# Patient Record
Sex: FEMALE | Race: WHITE | NOT HISPANIC OR LATINO | Employment: OTHER | URBAN - METROPOLITAN AREA
[De-identification: names, ages, dates, MRNs, and addresses within clinical notes are randomized per-mention and may not be internally consistent; named-entity substitution may affect disease eponyms.]

---

## 2018-10-22 RX ORDER — CARBIDOPA AND LEVODOPA 25; 100 MG/1; MG/1
1 TABLET ORAL 3 TIMES DAILY
COMMUNITY
End: 2024-01-01

## 2018-10-22 RX ORDER — MULTIVIT WITH MINERALS/LUTEIN
1 TABLET ORAL DAILY
COMMUNITY
End: 2023-01-01

## 2018-10-24 ENCOUNTER — ANESTHESIA EVENT (OUTPATIENT)
Dept: SURGERY | Facility: CLINIC | Age: 78
DRG: 483 | End: 2018-10-24
Payer: MEDICARE

## 2018-10-25 ENCOUNTER — HOSPITAL ENCOUNTER (INPATIENT)
Facility: CLINIC | Age: 78
LOS: 2 days | Discharge: HOME | DRG: 483 | End: 2018-10-27
Attending: ORTHOPAEDIC SURGERY | Admitting: ORTHOPAEDIC SURGERY
Payer: MEDICARE

## 2018-10-25 ENCOUNTER — ANESTHESIA (OUTPATIENT)
Dept: SURGERY | Facility: CLINIC | Age: 78
DRG: 483 | End: 2018-10-25
Payer: MEDICARE

## 2018-10-25 ENCOUNTER — APPOINTMENT (OUTPATIENT)
Dept: GENERAL RADIOLOGY | Facility: CLINIC | Age: 78
DRG: 483 | End: 2018-10-25
Attending: ORTHOPAEDIC SURGERY
Payer: MEDICARE

## 2018-10-25 DIAGNOSIS — M12.811 ROTATOR CUFF TEAR ARTHROPATHY, RIGHT: Primary | ICD-10-CM

## 2018-10-25 DIAGNOSIS — M75.101 ROTATOR CUFF TEAR ARTHROPATHY, RIGHT: Primary | ICD-10-CM

## 2018-10-25 PROCEDURE — 27210794 ZZH OR GENERAL SUPPLY STERILE: Performed by: ORTHOPAEDIC SURGERY

## 2018-10-25 PROCEDURE — 25000132 ZZH RX MED GY IP 250 OP 250 PS 637: Mod: GY | Performed by: ORTHOPAEDIC SURGERY

## 2018-10-25 PROCEDURE — 25000128 H RX IP 250 OP 636: Performed by: PHYSICIAN ASSISTANT

## 2018-10-25 PROCEDURE — 25000566 ZZH SEVOFLURANE, EA 15 MIN: Performed by: ORTHOPAEDIC SURGERY

## 2018-10-25 PROCEDURE — C1713 ANCHOR/SCREW BN/BN,TIS/BN: HCPCS | Performed by: ORTHOPAEDIC SURGERY

## 2018-10-25 PROCEDURE — 27810169 ZZH OR IMPLANT GENERAL: Performed by: ORTHOPAEDIC SURGERY

## 2018-10-25 PROCEDURE — L3670 SO ACRO/CLAV CAN WEB PRE OTS: HCPCS

## 2018-10-25 PROCEDURE — 25800025 ZZH RX 258: Performed by: ORTHOPAEDIC SURGERY

## 2018-10-25 PROCEDURE — 40000986 XR SHOULDER RT PORT G/E 2 VW: Mod: RT

## 2018-10-25 PROCEDURE — 25000128 H RX IP 250 OP 636: Performed by: ORTHOPAEDIC SURGERY

## 2018-10-25 PROCEDURE — 36000063 ZZH SURGERY LEVEL 4 EA 15 ADDTL MIN: Performed by: ORTHOPAEDIC SURGERY

## 2018-10-25 PROCEDURE — 27110028 ZZH OR GENERAL SUPPLY NON-STERILE: Performed by: ORTHOPAEDIC SURGERY

## 2018-10-25 PROCEDURE — 25000128 H RX IP 250 OP 636: Performed by: NURSE ANESTHETIST, CERTIFIED REGISTERED

## 2018-10-25 PROCEDURE — C1776 JOINT DEVICE (IMPLANTABLE): HCPCS | Performed by: ORTHOPAEDIC SURGERY

## 2018-10-25 PROCEDURE — 25000128 H RX IP 250 OP 636: Performed by: ANESTHESIOLOGY

## 2018-10-25 PROCEDURE — 36000093 ZZH SURGERY LEVEL 4 1ST 30 MIN: Performed by: ORTHOPAEDIC SURGERY

## 2018-10-25 PROCEDURE — 71000012 ZZH RECOVERY PHASE 1 LEVEL 1 FIRST HR: Performed by: ORTHOPAEDIC SURGERY

## 2018-10-25 PROCEDURE — 37000009 ZZH ANESTHESIA TECHNICAL FEE, EACH ADDTL 15 MIN: Performed by: ORTHOPAEDIC SURGERY

## 2018-10-25 PROCEDURE — 25000128 H RX IP 250 OP 636

## 2018-10-25 PROCEDURE — 0RRJ00Z REPLACEMENT OF RIGHT SHOULDER JOINT WITH REVERSE BALL AND SOCKET SYNTHETIC SUBSTITUTE, OPEN APPROACH: ICD-10-PCS | Performed by: ORTHOPAEDIC SURGERY

## 2018-10-25 PROCEDURE — 37000008 ZZH ANESTHESIA TECHNICAL FEE, 1ST 30 MIN: Performed by: ORTHOPAEDIC SURGERY

## 2018-10-25 PROCEDURE — 12000007 ZZH R&B INTERMEDIATE

## 2018-10-25 PROCEDURE — 40000169 ZZH STATISTIC PRE-PROCEDURE ASSESSMENT I: Performed by: ORTHOPAEDIC SURGERY

## 2018-10-25 PROCEDURE — 25000125 ZZHC RX 250: Performed by: NURSE ANESTHETIST, CERTIFIED REGISTERED

## 2018-10-25 PROCEDURE — A9270 NON-COVERED ITEM OR SERVICE: HCPCS | Mod: GY | Performed by: ORTHOPAEDIC SURGERY

## 2018-10-25 DEVICE — IMP SHOULDER HUMERAL HEAD METAPHYSIS 36MM DWB960: Type: IMPLANTABLE DEVICE | Site: SHOULDER | Status: FUNCTIONAL

## 2018-10-25 DEVICE — BONE CEMENT STRK SIMPLEX P SPEEDSET 6192-1-001: Type: IMPLANTABLE DEVICE | Site: SHOULDER | Status: FUNCTIONAL

## 2018-10-25 DEVICE — IMP SCR LOCKING 4.5X20MM AQLS DWD020: Type: IMPLANTABLE DEVICE | Site: SHOULDER | Status: FUNCTIONAL

## 2018-10-25 DEVICE — IMP SCR LOCKING 4.5X35MM AQLS DWD035: Type: IMPLANTABLE DEVICE | Site: SHOULDER | Status: FUNCTIONAL

## 2018-10-25 DEVICE — IMP SCR FIXATION 4.5X32MM AQLS VDV232: Type: IMPLANTABLE DEVICE | Site: SHOULDER | Status: FUNCTIONAL

## 2018-10-25 DEVICE — IMP SHOULDER HUMERAL HEAD +6MM 36MM DWB993: Type: IMPLANTABLE DEVICE | Site: SHOULDER | Status: FUNCTIONAL

## 2018-10-25 DEVICE — IMP BASEPLATE SHLDR GLENOID AQLS REV II 25MM DWD170: Type: IMPLANTABLE DEVICE | Site: SHOULDER | Status: FUNCTIONAL

## 2018-10-25 DEVICE — IMP COMP SHLDR GLENOID SPHERE AQLS REV II 36X25MM DWD180: Type: IMPLANTABLE DEVICE | Site: SHOULDER | Status: FUNCTIONAL

## 2018-10-25 DEVICE — CEMENT RESTRICTOR 24MM EBO101: Type: IMPLANTABLE DEVICE | Site: SHOULDER | Status: FUNCTIONAL

## 2018-10-25 DEVICE — IMP STEM SHOULDER 9.0X100MM AQLS DWB945: Type: IMPLANTABLE DEVICE | Site: SHOULDER | Status: FUNCTIONAL

## 2018-10-25 RX ORDER — MAGNESIUM OXIDE 400 MG/1
400 TABLET ORAL DAILY
Status: DISCONTINUED | OUTPATIENT
Start: 2018-10-25 | End: 2018-10-27 | Stop reason: HOSPADM

## 2018-10-25 RX ORDER — PROPOFOL 10 MG/ML
INJECTION, EMULSION INTRAVENOUS PRN
Status: DISCONTINUED | OUTPATIENT
Start: 2018-10-25 | End: 2018-10-25

## 2018-10-25 RX ORDER — CARBIDOPA AND LEVODOPA 25; 100 MG/1; MG/1
1 TABLET ORAL
Status: DISCONTINUED | OUTPATIENT
Start: 2018-10-25 | End: 2018-10-27 | Stop reason: HOSPADM

## 2018-10-25 RX ORDER — DEXAMETHASONE SODIUM PHOSPHATE 4 MG/ML
INJECTION, SOLUTION INTRA-ARTICULAR; INTRALESIONAL; INTRAMUSCULAR; INTRAVENOUS; SOFT TISSUE PRN
Status: DISCONTINUED | OUTPATIENT
Start: 2018-10-25 | End: 2018-10-25

## 2018-10-25 RX ORDER — ACETAMINOPHEN 325 MG/1
975 TABLET ORAL
Status: DISCONTINUED | OUTPATIENT
Start: 2018-10-25 | End: 2018-10-27 | Stop reason: HOSPADM

## 2018-10-25 RX ORDER — HYDROXYZINE HYDROCHLORIDE 10 MG/1
10 TABLET, FILM COATED ORAL EVERY 6 HOURS PRN
Status: DISCONTINUED | OUTPATIENT
Start: 2018-10-25 | End: 2018-10-27 | Stop reason: HOSPADM

## 2018-10-25 RX ORDER — SODIUM CHLORIDE, SODIUM LACTATE, POTASSIUM CHLORIDE, CALCIUM CHLORIDE 600; 310; 30; 20 MG/100ML; MG/100ML; MG/100ML; MG/100ML
INJECTION, SOLUTION INTRAVENOUS CONTINUOUS
Status: DISCONTINUED | OUTPATIENT
Start: 2018-10-25 | End: 2018-10-26 | Stop reason: CLARIF

## 2018-10-25 RX ORDER — ONDANSETRON 2 MG/ML
4 INJECTION INTRAMUSCULAR; INTRAVENOUS EVERY 6 HOURS PRN
Status: DISCONTINUED | OUTPATIENT
Start: 2018-10-25 | End: 2018-10-27 | Stop reason: HOSPADM

## 2018-10-25 RX ORDER — GLYCOPYRROLATE 0.2 MG/ML
INJECTION, SOLUTION INTRAMUSCULAR; INTRAVENOUS PRN
Status: DISCONTINUED | OUTPATIENT
Start: 2018-10-25 | End: 2018-10-25

## 2018-10-25 RX ORDER — NALOXONE HYDROCHLORIDE 0.4 MG/ML
.1-.4 INJECTION, SOLUTION INTRAMUSCULAR; INTRAVENOUS; SUBCUTANEOUS
Status: DISCONTINUED | OUTPATIENT
Start: 2018-10-25 | End: 2018-10-27 | Stop reason: HOSPADM

## 2018-10-25 RX ORDER — CEFAZOLIN SODIUM 1 G/3ML
1 INJECTION, POWDER, FOR SOLUTION INTRAMUSCULAR; INTRAVENOUS EVERY 8 HOURS
Status: COMPLETED | OUTPATIENT
Start: 2018-10-25 | End: 2018-10-26

## 2018-10-25 RX ORDER — FENTANYL CITRATE 50 UG/ML
25-50 INJECTION, SOLUTION INTRAMUSCULAR; INTRAVENOUS
Status: DISCONTINUED | OUTPATIENT
Start: 2018-10-25 | End: 2018-10-25 | Stop reason: HOSPADM

## 2018-10-25 RX ORDER — MULTIPLE VITAMINS W/ MINERALS TAB 9MG-400MCG
1 TAB ORAL DAILY
Status: DISCONTINUED | OUTPATIENT
Start: 2018-10-25 | End: 2018-10-27 | Stop reason: HOSPADM

## 2018-10-25 RX ORDER — ONDANSETRON 2 MG/ML
INJECTION INTRAMUSCULAR; INTRAVENOUS PRN
Status: DISCONTINUED | OUTPATIENT
Start: 2018-10-25 | End: 2018-10-25

## 2018-10-25 RX ORDER — ACETAMINOPHEN 325 MG/1
650 TABLET ORAL EVERY 4 HOURS PRN
Status: DISCONTINUED | OUTPATIENT
Start: 2018-10-28 | End: 2018-10-27 | Stop reason: HOSPADM

## 2018-10-25 RX ORDER — OXYCODONE HYDROCHLORIDE 5 MG/1
5-10 TABLET ORAL EVERY 4 HOURS PRN
Status: DISCONTINUED | OUTPATIENT
Start: 2018-10-25 | End: 2018-10-27 | Stop reason: HOSPADM

## 2018-10-25 RX ORDER — ASPIRIN 81 MG/1
81 TABLET ORAL DAILY
Status: DISCONTINUED | OUTPATIENT
Start: 2018-10-25 | End: 2018-10-27 | Stop reason: HOSPADM

## 2018-10-25 RX ORDER — SODIUM CHLORIDE, SODIUM LACTATE, POTASSIUM CHLORIDE, CALCIUM CHLORIDE 600; 310; 30; 20 MG/100ML; MG/100ML; MG/100ML; MG/100ML
INJECTION, SOLUTION INTRAVENOUS CONTINUOUS
Status: DISCONTINUED | OUTPATIENT
Start: 2018-10-25 | End: 2018-10-25 | Stop reason: HOSPADM

## 2018-10-25 RX ORDER — ONDANSETRON 4 MG/1
4 TABLET, ORALLY DISINTEGRATING ORAL EVERY 30 MIN PRN
Status: DISCONTINUED | OUTPATIENT
Start: 2018-10-25 | End: 2018-10-25 | Stop reason: HOSPADM

## 2018-10-25 RX ORDER — ONDANSETRON 4 MG/1
4 TABLET, ORALLY DISINTEGRATING ORAL EVERY 6 HOURS PRN
Status: DISCONTINUED | OUTPATIENT
Start: 2018-10-25 | End: 2018-10-27 | Stop reason: HOSPADM

## 2018-10-25 RX ORDER — CEFAZOLIN SODIUM 1 G/3ML
INJECTION, POWDER, FOR SOLUTION INTRAMUSCULAR; INTRAVENOUS PRN
Status: DISCONTINUED | OUTPATIENT
Start: 2018-10-25 | End: 2018-10-25

## 2018-10-25 RX ORDER — VANCOMYCIN HCL 900 MCG/MG
POWDER (GRAM) MISCELLANEOUS PRN
Status: DISCONTINUED | OUTPATIENT
Start: 2018-10-25 | End: 2018-10-25 | Stop reason: HOSPADM

## 2018-10-25 RX ORDER — AMOXICILLIN 250 MG
1 CAPSULE ORAL 2 TIMES DAILY
Status: DISCONTINUED | OUTPATIENT
Start: 2018-10-25 | End: 2018-10-27 | Stop reason: HOSPADM

## 2018-10-25 RX ORDER — CEFAZOLIN SODIUM 2 G/100ML
2 INJECTION, SOLUTION INTRAVENOUS
Status: COMPLETED | OUTPATIENT
Start: 2018-10-25 | End: 2018-10-25

## 2018-10-25 RX ORDER — ONDANSETRON 2 MG/ML
4 INJECTION INTRAMUSCULAR; INTRAVENOUS EVERY 30 MIN PRN
Status: DISCONTINUED | OUTPATIENT
Start: 2018-10-25 | End: 2018-10-25 | Stop reason: HOSPADM

## 2018-10-25 RX ORDER — FENTANYL CITRATE 50 UG/ML
INJECTION, SOLUTION INTRAMUSCULAR; INTRAVENOUS PRN
Status: DISCONTINUED | OUTPATIENT
Start: 2018-10-25 | End: 2018-10-25

## 2018-10-25 RX ORDER — ACETAMINOPHEN 500 MG
500-1000 TABLET ORAL 2 TIMES DAILY PRN
Status: DISCONTINUED | OUTPATIENT
Start: 2018-10-25 | End: 2018-10-25

## 2018-10-25 RX ORDER — CEFAZOLIN SODIUM 1 G/3ML
1 INJECTION, POWDER, FOR SOLUTION INTRAMUSCULAR; INTRAVENOUS SEE ADMIN INSTRUCTIONS
Status: DISCONTINUED | OUTPATIENT
Start: 2018-10-25 | End: 2018-10-25 | Stop reason: HOSPADM

## 2018-10-25 RX ORDER — HYDROMORPHONE HYDROCHLORIDE 1 MG/ML
.3-.5 INJECTION, SOLUTION INTRAMUSCULAR; INTRAVENOUS; SUBCUTANEOUS
Status: DISCONTINUED | OUTPATIENT
Start: 2018-10-25 | End: 2018-10-27 | Stop reason: HOSPADM

## 2018-10-25 RX ORDER — NEOSTIGMINE METHYLSULFATE 1 MG/ML
VIAL (ML) INJECTION PRN
Status: DISCONTINUED | OUTPATIENT
Start: 2018-10-25 | End: 2018-10-25

## 2018-10-25 RX ORDER — LIDOCAINE 40 MG/G
CREAM TOPICAL
Status: DISCONTINUED | OUTPATIENT
Start: 2018-10-25 | End: 2018-10-27 | Stop reason: HOSPADM

## 2018-10-25 RX ORDER — NALOXONE HYDROCHLORIDE 0.4 MG/ML
.1-.4 INJECTION, SOLUTION INTRAMUSCULAR; INTRAVENOUS; SUBCUTANEOUS
Status: DISCONTINUED | OUTPATIENT
Start: 2018-10-25 | End: 2018-10-25

## 2018-10-25 RX ORDER — HYDROMORPHONE HYDROCHLORIDE 1 MG/ML
.3-.5 INJECTION, SOLUTION INTRAMUSCULAR; INTRAVENOUS; SUBCUTANEOUS EVERY 5 MIN PRN
Status: DISCONTINUED | OUTPATIENT
Start: 2018-10-25 | End: 2018-10-25 | Stop reason: HOSPADM

## 2018-10-25 RX ORDER — AMOXICILLIN 250 MG
2 CAPSULE ORAL 2 TIMES DAILY
Status: DISCONTINUED | OUTPATIENT
Start: 2018-10-25 | End: 2018-10-27 | Stop reason: HOSPADM

## 2018-10-25 RX ORDER — LIDOCAINE HYDROCHLORIDE 20 MG/ML
INJECTION, SOLUTION INFILTRATION; PERINEURAL PRN
Status: DISCONTINUED | OUTPATIENT
Start: 2018-10-25 | End: 2018-10-25

## 2018-10-25 RX ADMIN — ROCURONIUM BROMIDE 40 MG: 10 INJECTION INTRAVENOUS at 12:49

## 2018-10-25 RX ADMIN — SENNOSIDES AND DOCUSATE SODIUM 1 TABLET: 8.6; 5 TABLET ORAL at 21:12

## 2018-10-25 RX ADMIN — DEXAMETHASONE SODIUM PHOSPHATE 4 MG: 4 INJECTION, SOLUTION INTRA-ARTICULAR; INTRALESIONAL; INTRAMUSCULAR; INTRAVENOUS; SOFT TISSUE at 12:54

## 2018-10-25 RX ADMIN — HYDROMORPHONE HYDROCHLORIDE 0.3 MG: 1 INJECTION, SOLUTION INTRAMUSCULAR; INTRAVENOUS; SUBCUTANEOUS at 14:59

## 2018-10-25 RX ADMIN — ROPIVACAINE HYDROCHLORIDE: 5 INJECTION, SOLUTION EPIDURAL; INFILTRATION; PERINEURAL at 13:43

## 2018-10-25 RX ADMIN — SODIUM CHLORIDE, POTASSIUM CHLORIDE, SODIUM LACTATE AND CALCIUM CHLORIDE: 600; 310; 30; 20 INJECTION, SOLUTION INTRAVENOUS at 17:30

## 2018-10-25 RX ADMIN — FENTANYL CITRATE 100 MCG: 50 INJECTION, SOLUTION INTRAMUSCULAR; INTRAVENOUS at 12:49

## 2018-10-25 RX ADMIN — CARBIDOPA AND LEVODOPA 1 TABLET: 25; 100 TABLET ORAL at 21:11

## 2018-10-25 RX ADMIN — NEOSTIGMINE METHYLSULFATE 3 MG: 1 INJECTION, SOLUTION INTRAVENOUS at 14:39

## 2018-10-25 RX ADMIN — PHENYLEPHRINE HYDROCHLORIDE 100 MCG: 10 INJECTION, SOLUTION INTRAMUSCULAR; INTRAVENOUS; SUBCUTANEOUS at 13:21

## 2018-10-25 RX ADMIN — Medication 0.5 MG: at 17:54

## 2018-10-25 RX ADMIN — OXYCODONE HYDROCHLORIDE 5 MG: 5 TABLET ORAL at 21:12

## 2018-10-25 RX ADMIN — CEFAZOLIN 1 G: 1 INJECTION, POWDER, FOR SOLUTION INTRAMUSCULAR; INTRAVENOUS at 14:53

## 2018-10-25 RX ADMIN — ONDANSETRON 4 MG: 2 INJECTION INTRAMUSCULAR; INTRAVENOUS at 14:38

## 2018-10-25 RX ADMIN — FENTANYL CITRATE 50 MCG: 50 INJECTION, SOLUTION INTRAMUSCULAR; INTRAVENOUS at 13:30

## 2018-10-25 RX ADMIN — HYDROMORPHONE HYDROCHLORIDE 0.2 MG: 1 INJECTION, SOLUTION INTRAMUSCULAR; INTRAVENOUS; SUBCUTANEOUS at 14:44

## 2018-10-25 RX ADMIN — Medication 0.5 MG: at 15:36

## 2018-10-25 RX ADMIN — Medication 0.5 MG: at 15:18

## 2018-10-25 RX ADMIN — SODIUM CHLORIDE, POTASSIUM CHLORIDE, SODIUM LACTATE AND CALCIUM CHLORIDE: 600; 310; 30; 20 INJECTION, SOLUTION INTRAVENOUS at 10:18

## 2018-10-25 RX ADMIN — PHENYLEPHRINE HYDROCHLORIDE 100 MCG: 10 INJECTION, SOLUTION INTRAMUSCULAR; INTRAVENOUS; SUBCUTANEOUS at 13:02

## 2018-10-25 RX ADMIN — ACETAMINOPHEN 975 MG: 325 TABLET, FILM COATED ORAL at 21:11

## 2018-10-25 RX ADMIN — CEFAZOLIN SODIUM 1 G: 1 INJECTION, POWDER, FOR SOLUTION INTRAMUSCULAR; INTRAVENOUS at 22:29

## 2018-10-25 RX ADMIN — PROPOFOL 100 MG: 10 INJECTION, EMULSION INTRAVENOUS at 12:49

## 2018-10-25 RX ADMIN — CEFAZOLIN SODIUM 2 G: 2 INJECTION, SOLUTION INTRAVENOUS at 12:55

## 2018-10-25 RX ADMIN — MIDAZOLAM 2 MG: 1 INJECTION INTRAMUSCULAR; INTRAVENOUS at 12:41

## 2018-10-25 RX ADMIN — FENTANYL CITRATE 50 MCG: 50 INJECTION, SOLUTION INTRAMUSCULAR; INTRAVENOUS at 13:28

## 2018-10-25 RX ADMIN — GLYCOPYRROLATE 0.4 MG: 0.2 INJECTION, SOLUTION INTRAMUSCULAR; INTRAVENOUS at 14:39

## 2018-10-25 RX ADMIN — SODIUM CHLORIDE, POTASSIUM CHLORIDE, SODIUM LACTATE AND CALCIUM CHLORIDE: 600; 310; 30; 20 INJECTION, SOLUTION INTRAVENOUS at 14:46

## 2018-10-25 RX ADMIN — LIDOCAINE HYDROCHLORIDE 100 MG: 20 INJECTION, SOLUTION INFILTRATION; PERINEURAL at 12:49

## 2018-10-25 RX ADMIN — PHENYLEPHRINE HYDROCHLORIDE 100 MCG: 10 INJECTION, SOLUTION INTRAMUSCULAR; INTRAVENOUS; SUBCUTANEOUS at 13:37

## 2018-10-25 RX ADMIN — PHENYLEPHRINE HYDROCHLORIDE 50 MCG: 10 INJECTION, SOLUTION INTRAMUSCULAR; INTRAVENOUS; SUBCUTANEOUS at 13:50

## 2018-10-25 ASSESSMENT — PAIN DESCRIPTION - DESCRIPTORS
DESCRIPTORS: BURNING;CONSTANT
DESCRIPTORS: BURNING

## 2018-10-25 ASSESSMENT — ACTIVITIES OF DAILY LIVING (ADL): ADLS_ACUITY_SCORE: 11

## 2018-10-25 ASSESSMENT — LIFESTYLE VARIABLES: TOBACCO_USE: 0

## 2018-10-25 NOTE — PROGRESS NOTES
S. We received an order for an right shoulder Ultrasling III at the main OR.    O/goal. To reduce motion and help with post-op support    A. At this time, I have provided the main OR with a size medium eJfferson Molina Ultrasling III.    P. Staffing have been instructed to contact our facility with any future questions and/or concerns.     Rakesh REYES

## 2018-10-25 NOTE — BRIEF OP NOTE
[facility] Brief Operative Note    Pre-operative diagnosis: RIGHT ROTATOR CUFF TEAR ARTHROPATHY, ADVANCED    Post-operative diagnosis SAME   Procedure: 1)  RIGHT REVERSE TOTAL SHOULDER ARTHROPLASTY  2)  RIGHT LONG HEAD BICEPS TENODESIS   Surgeon(s): Surgeon(s) and Role:     * Edd Currie MD - Primary     * David Santiago PA-C - Assisting   Estimated blood loss: 100 CC   Specimens: * No specimens in log *   Findings: SEE DX

## 2018-10-25 NOTE — ANESTHESIA PREPROCEDURE EVALUATION
Procedure: Procedure(s):  RIGHT REVERSE TOTAL SHOULDER ARTHROPLASTY (TORNIER AEQUALIS)^  Preop diagnosis: right rotator cuff tear arthritis     Allergies   Allergen Reactions     Other [Seasonal Allergies]      ragweed       Past Medical History:   Diagnosis Date     Acne rosacea      Anemia 1989     Arthritis      Basal cell carcinoma      Cataract, unspecified cataract type, unspecified laterality      Cervicalgia      Degeneration of cervical intervertebral disc 06/1999    C3-C7     Hearing loss      Hyperlipidemia      Lumbago      Osteoarthrosis      Pain in right shoulder      Parkinson's disease (H)      Resting tremor      Rhinitis, allergic      Right leg weakness      Varicose vein of leg        Past Surgical History:   Procedure Laterality Date     APPENDECTOMY  1964     COLONOSCOPY  10/31/2006     EYE SURGERY  10/05/2011    blepharoplasty     GYN SURGERY  1974    Ligate fallopian tube     HERNIA REPAIR  1964    Incisional hernia, reducible     ORTHOPEDIC SURGERY Right 2005    arthroscopy of joint shoulder     ORTHOPEDIC SURGERY Left 04/10/2017    knee arthroplasty     SIGMOIDOSCOPY FLEXIBLE  10/2000       Social History   Substance Use Topics     Smoking status: Never Smoker     Smokeless tobacco: Never Used     Alcohol use Yes      Comment: occasinal       Prior to Admission medications    Medication Sig Start Date End Date Taking? Authorizing Provider   Acetaminophen (TYLENOL PO) Take 500-1,000 mg by mouth 2 times daily as needed for mild pain or fever   Yes Reported, Patient   CALCIUM PO Take 600 mg by mouth daily   Yes Reported, Patient   carbidopa-levodopa (SINEMET)  MG per tablet Take 1 tablet by mouth 2 times daily (before meals) An hour before breakfast and an hour before supper  (Patient takes differently than prescribed; RX is 1 tablet three times daily)   Yes Reported, Patient   MAGNESIUM PO Take 250 mg by mouth daily   Yes Reported, Patient   Multiple Vitamins-Minerals (CENTRUM SILVER)  per tablet Take 1 tablet by mouth daily   Yes Reported, Patient   VITAMIN D, CHOLECALCIFEROL, PO Take 2,000 Units by mouth daily   Yes Reported, Patient     Current Facility-Administered Medications Ordered in Epic   Medication Dose Route Frequency Last Rate Last Dose     ceFAZolin (ANCEF) 1 g vial to attach to  ml bag for ADULT or 50 ml bag for PEDS  1 g Intravenous See Admin Instructions         ceFAZolin (ANCEF) intermittent infusion 2 g in 100 mL dextrose PRE-MIX  2 g Intravenous Pre-Op/Pre-procedure x 1 dose         lactated ringers infusion   Intravenous Continuous 25 mL/hr at 10/25/18 1018       lidocaine 1 % 1 mL  1 mL Other Q1H PRN         ROPivacaine (NAROPIN) 0.5 % 28.5 mL with ketorolac (TORADOL) 15 mg, morphine (PF) (ASTRAMORPH / DURAMORPH) 0.5 mg   INTRA-ARTICULAR Once         sodium chloride (PF) 0.9% PF flush 3 mL  3 mL Intracatheter Q8H         No current [facility]-ordered outpatient prescriptions on file.       lactated ringers 25 mL/hr at 10/25/18 1018       Wt Readings from Last 1 Encounters:   No data found for Wt     Temp Readings from Last 1 Encounters:   10/25/18 36.5  C (97.7  F) (Temporal)     BP Readings from Last 6 Encounters:   10/25/18 151/90     Pulse Readings from Last 4 Encounters:   10/25/18 80     Resp Readings from Last 1 Encounters:   10/25/18 16     SpO2 Readings from Last 1 Encounters:   10/25/18 97%     No results for input(s): NA, POTASSIUM, CHLORIDE, CO2, ANIONGAP, GLC, BUN, CR, SHANNON in the last 90633 hours.  No results for input(s): AST, ALT, ALKPHOS, BILITOTAL, LIPASE in the last 55204 hours.  No results for input(s): WBC, HGB, PLT in the last 53985 hours.  No results for input(s): ABO, RH in the last 69858 hours.  No results for input(s): INR, PTT in the last 09503 hours.   No results for input(s): TROPI in the last 60793 hours.  No results for input(s): PH, PCO2, PO2, HCO3 in the last 79794 hours.  No results for input(s): HCG in the last 00221 hours.    No results found  for this or any previous visit (from the past 744 hour(s)).    Anesthesia Evaluation     . Pt has had prior anesthetic. Type: General    No history of anesthetic complications          ROS/MED HX    ENT/Pulmonary:      (-) tobacco use, sleep apnea and BREANNA risk factors   Neurologic:     (+)Parkinson's disease features: Resting tremor, mild; takes Sinemet 2x daily, had morning dose this am,     Cardiovascular:     (+) Dyslipidemia, ----. : . . . :. . Previous cardiac testing date:results:date: results:ECG reviewed date:9/27/18 results:NSR date: results:         (-) stent   METS/Exercise Tolerance:  >4 METS   Hematologic:  - neg hematologic  ROS       Musculoskeletal:   (+) arthritis, , , other musculoskeletal- Chronic neck and back pain and cervical disc degeneration      GI/Hepatic:  - neg GI/hepatic ROS      (-) GERD, hiatal hernia and liver disease   Renal/Genitourinary:  - ROS Renal section negative    (-) renal disease   Endo:  - neg endo ROS       Psychiatric:  - neg psychiatric ROS       Infectious Disease:  - neg infectious disease ROS       Malignancy:   (+) Malignancy History of Skin          Other:                     Physical Exam  Normal systems: cardiovascular and pulmonary    Airway   Mallampati: II  TM distance: >3 FB  Neck ROM: full    Dental   (+) caps    Cardiovascular   Rhythm and rate: regular and normal      Pulmonary                     Anesthesia Plan      History & Physical Review  History and physical reviewed and following examination; no interval change.    ASA Status:  3 .    NPO Status:  > 8 hours    Plan for General and ETT with Intravenous and Propofol induction. Maintenance will be Balanced.    PONV prophylaxis:  Ondansetron (or other 5HT-3) and Dexamethasone or Solumedrol  Additional equipment: Videolaryngoscope      Postoperative Care  Postoperative pain management:  IV analgesics and Oral pain medications.  Plan for postoperative opioid use.    Consents  Anesthetic plan, risks,  benefits and alternatives discussed with:  Patient..                          .

## 2018-10-25 NOTE — IP AVS SNAPSHOT
[address] [facility]    [address] [place] [address]    Phone:  [phone]                                       After Visit Summary   10/25/2018    Jennifer Venegas    MRN: [id number]           After Visit Summary Signature Page     I have received my discharge instructions, and my questions have been answered. I have discussed any challenges I see with this plan with the nurse or doctor.    ..........................................................................................................................................  Patient/Patient Representative Signature      ..........................................................................................................................................  Patient Representative Print Name and Relationship to Patient    ..................................................               ................................................  Date                                   Time    ..........................................................................................................................................  Reviewed by Signature/Title    ...................................................              ..............................................  Date                                               Time          22EPIC Rev 08/18

## 2018-10-25 NOTE — OP NOTE
Procedure Date: 10/25/2018      PREOPERATIVE DIAGNOSIS:  Right rotator cuff tear arthropathy, advanced, status post rotator cuff repair in remote past.      POSTOPERATIVE DIAGNOSIS:  Right rotator cuff tear arthropathy, advanced, status post rotator cuff repair in remote past.      PROCEDURES:   1.  Right reverse total shoulder arthroplasty.   2.  Right long head biceps tenodesis.      SURGEON:  Edd Currie MD      ASSISTANT:  David Santiago PA-C      ANESTHESIA:  General endotracheal.      ESTIMATED BLOOD LOSS:  100 mL.      FLUID REPLACEMENT:  One liter crystalloid.      COMPLICATIONS:  No immediate complications.      INDICATIONS:  Please see preoperative clinical notes.      EXAMINATION OF RIGHT SHOULDER UNDER ANESTHESIA FINDINGS:  Passive range of motion 135/70/40/70/40.      COMPONENT UTILIZED:   1.  Tornier Aequalis reversed humeral component, 9 x 100 mm stem, with standard 36 mm metaphyseal component, secured in 10 degrees retroversion with full vancomycin impregnated cement fixation, with distal cement restrictor.   2.  A 6 x 36 mm all polyethylene humeral insert.   3.  Standard 25 mm baseplate.   4.  Standard 36 mm glenosphere component.      SCREW CONFIGURATION:   1.  Superior locking screw, 20 mm.   2.  Inferior locking screw, 35 mm.   3.  Anterior compression screw directed posterior and slightly superior, 32 mm with excellent purchase.      DESCRIPTION OF PROCEDURE:  The patient was identified in the preoperative holding area and taken to room #50 of the main OR.  Monitors were placed per the Anesthesia Service.  After smooth IV induction, general anesthesia was introduced and her endotracheal tube secured.  She was given 2 gm Ancef IV.  She was then repositioned in modified beach chair position with the head and neck secured in neutral position and the peripheral extremities thoroughly padded with foam.  Right upper extremity was then widely prepped and draped in the usual sterile fashion.   Pneumoboots were in place and operational during the procedure.      Surgical team took timeout to confirm the correct patient, correct site marking, correct equipment and implants, correct images were available, and that she had been administered IV antibiotic therapy.      An oblique skin incision was centered over the anterior right shoulder.  Skin flaps elevated.  Cephalic vein identified and retracted laterally with the deltoid, the pectoralis and conjoined tendon swept medially in successive layers without excessive tension on the musculocutaneous nerve.  Subscapularis was then tenotomized en bloc with the anterior capsule and elevated medially while protecting the medial neurovascular bundle and the axillary nerve.  Chronic posterosuperior rotator cuff tear that was recurrent was encountered.  All of her sutures were removed from the prior repair site.  Shoulder was then gently dislocated, marginal osteophytes removed with osteotomes and rongeurs circumferentially.  A standard anatomic humeral neck cut was then performed, reaming and broaching undertaken up to a 9 mm trial which had excellent fit without instability and was accepted.      The glenoid was then circumferentially exposed.  A complete labrectomy/capsulectomy was performed to allow full visualization and release of the soft tissue.  A low central starting point for reaming was undertaken to flatten the glenoid to a cortical cancellous interface to accept the baseplate.  The peripheral bone was removed with a highspeed bur as well as rongeurs to a flattened interface.  Copious lavage was then repeated.  Opening drill utilized and the baseplate then impacted to appropriate press-fit, was then secured with the superior and inferior locking screws as well as anterior compression screw with excellent fixation noted.  This was then pulse-lavaged and suctioned dry, and the actual glenosphere implant was then impacted to appropriate press-fit with  excellent security and was then backed up with a central locking screw.      We repeated trialing on the humeral side and a 6 mm insert had appropriate soft tissue tension without rocking or instability and was accepted.      The humeral trials were then removed.  Copious lavage of the humeral canal then repeated.  Cement restrictor placed at appropriate depth.  Epinephrine-soaked packing utilized to obtain hemostasis within the canal.  The cement was mixed on the back table and in doughy state was retrograde injected, pressurized slightly, and the actual implant then impacted to appropriate press-fit and the excess cement removed as it cured.  Excellent fixation was achieved.  The trunnion was pulse-lavaged and suction dried and the actual polyethylene insert was then impacted and found to be very stable and the shoulder was reduced with once again excellent soft tissue tension without instability noted.  Copious lavage was then repeated.  The anterior capsule and subscapularis were then repaired back to the lateral soft tissues with several figure-of-eight #2 Ethibond sutures incorporating the long head biceps for soft tissue tenodesis.  The soft tissue was then oversewn to the leading edge of the coracoacromial ligament to completely cover the implant.  Axillary nerve was palpated and found to be intact.  After thorough lavage, we then closed the deltopectoral interval over a medium Hemovac drain, brought out through clean anterolateral skin with #2 Ethibond suture.  The deltoid and pectoralis musculature were infiltrated with a total of 30 mL of 0.5% ropivacaine plain, 1 mg Duramorph, 30 mg Toradol.  Skin closed in layers with 3-0 Monocryl and 3-0 Prolene running subcuticular for skin.  Steri-Strips and a bulky sterile dressing were applied.  She was placed in an EZ wrap device and UltraSling in neutral position, was then reversed, extubated and taken back to the recovery room in stable condition.  There were no  immediate complications and she appeared to tolerate the procedure well.      A skilled first assistant was necessary for this procedure for assistance with patient positioning, prepping, draping, surgical visualization, implantation of the prosthesis, wound closure, dressing and sling application.      PQRI MEASURES:   1.  The patient was given 2 grams of Ancef IV within 1 hour prior to skin incision.  IV antibiotic therapy was discontinued within 24 hours postoperatively.   2.  DVT prophylaxis with aspirin and early mobilization x 3 weeks.   3.  UltraSling x 6 weeks.   4.  Standard phase 1 reverse total shoulder arthroplasty protocol for physical therapy.   5.  She should have a true AP and outlet radiograph of the right shoulder and return to office in 10-14 days for suture removal.         EDD MICHAEL MD             D: 10/25/2018   T: 10/25/2018   MT: ANNA      Name:     FLAQUITO PALAFOX   MRN:      [id number]        Account:        [id number]   :      1940           Procedure Date: 10/25/2018      Document: [id number]       cc: Edd Mcdaniel MD

## 2018-10-25 NOTE — ANESTHESIA CARE TRANSFER NOTE
Patient: Jennifer Venegas    Procedure(s):  RIGHT REVERSE TOTAL SHOULDER ARTHROPLASTY    Diagnosis: right rotator cuff tear arthritis   Diagnosis Additional Information: No value filed.    Anesthesia Type:   General, ETT     Note:  Airway :Face Mask  Patient transferred to:PACU  Comments: Spont. Resps, pt. Responding.  Extubated, sufficient air exchange. To PACU VSS, Monitors on. Report to RNHandoff Report: Identifed the Patient, Identified the Reponsible Provider, Reviewed the pertinent medical history, Discussed the surgical course, Reviewed Intra-OP anesthesia mangement and issues during anesthesia, Set expectations for post-procedure period and Allowed opportunity for questions and acknowledgement of understanding      Vitals: (Last set prior to Anesthesia Care Transfer)    CRNA VITALS  10/25/2018 1436 - 10/25/2018 1514      10/25/2018             NIBP: (!)  203/107    NIBP Mean: 130                Electronically Signed By: NAREN Liriano CRNA  October 25, 2018  3:14 PM

## 2018-10-25 NOTE — ANESTHESIA POSTPROCEDURE EVALUATION
Patient: Jennifer Venegas    Procedure(s):  RIGHT REVERSE TOTAL SHOULDER ARTHROPLASTY    Diagnosis:right rotator cuff tear arthritis   Diagnosis Additional Information: No value filed.    Anesthesia Type:  General, ETT    Note:  Anesthesia Post Evaluation    Patient location during evaluation: PACU  Patient participation: Able to fully participate in evaluation  Level of consciousness: awake  Pain management: adequate  Airway patency: patent  Cardiovascular status: acceptable  Respiratory status: acceptable  Hydration status: acceptable  PONV: none             Last vitals:  Vitals:    10/25/18 1028 10/25/18 1509 10/25/18 1510   BP: 151/90  (!) 178/100   Pulse: 80     Resp: 16  11   Temp: 36.5  C (97.7  F) 36.4  C (97.6  F)    SpO2: 97%  100%         Electronically Signed By: Michel Germain MD  October 25, 2018  3:37 PM

## 2018-10-25 NOTE — PROGRESS NOTES
Admission medication history interview status for the 10/25/2018  admission is complete. See EPIC admission navigator for prior to admission medications     Medication history source reliability:Good    Medication history interview source(s):Patient    Medication history resources (including written lists, pill bottles, clinic record):None    Primary [email or web address]    Additional medication history information not noted on PTA med list :None    Time spent in this activity: 45 minutes    Prior to Admission medications    Medication Sig Last Dose Taking? Auth Provider   Acetaminophen (TYLENOL PO) Take 500-1,000 mg by mouth 2 times daily as needed for mild pain or fever 10/24/2018 at prn Yes Reported, Patient   CALCIUM PO Take 600 mg by mouth daily 10/24/2018 at am Yes Reported, Patient   carbidopa-levodopa (SINEMET)  MG per tablet Take 1 tablet by mouth 2 times daily (before meals) An hour before breakfast and an hour before supper  (Patient takes differently than prescribed; RX is 1 tablet three times daily) 10/25/2018 at 0800 Yes Reported, Patient   MAGNESIUM PO Take 250 mg by mouth daily 10/24/2018 at am Yes Reported, Patient   Multiple Vitamins-Minerals (CENTRUM SILVER) per tablet Take 1 tablet by mouth daily 10/24/2018 at am Yes Reported, Patient   VITAMIN D, CHOLECALCIFEROL, PO Take 2,000 Units by mouth daily 10/24/2018 at am Yes Reported, Patient

## 2018-10-25 NOTE — PROGRESS NOTES
Hospitalist consult received for 78 year old female w/ hx HLD and Parkinson's adm under care of ortho for post op mngt right reverse total shoulder arthroplasty. Procedure performed under general anesthesia.    Vitals stable on 2L O2 NC. Discussed with nursing and well appearing, no acute concerns.     Blood pressure elevated primarily in 150s and not on anti-hypertensive PTA. PRN hydralazine available for SBP > 160. Will follow peripherally in AM. Hold formal consult at this time. Med rec completed.     Please contact hospitalist service if acute concerns arise.     JoAnna Barthell, PA-C  Pager: [phone]  10/25/2018   5:16 PM

## 2018-10-25 NOTE — IP AVS SNAPSHOT
MRN:[id number]                      After Visit Summary   10/25/2018    Jennifer Venegas    MRN: [id number]           Thank you!     Thank you for choosing [facility] for your care. Our goal is always to provide you with excellent care. Hearing back from our patients is one way we can continue to improve our services. Please take a few minutes to complete the written survey that you may receive in the mail after you visit with us. Thank you!        Patient Information     Date Of Birth          1940        Designated Caregiver       Most Recent Value    Caregiver    Will someone help with your care after discharge? yes    Name of designated caregiver Philip (son)    Phone number of caregiver [phone]    Caregiver address [address] [facility]      About your hospital stay     You were admitted on:  October 25, 2018 You last received care in the:  [place] [address] [facility]    You were discharged on:  October 27, 2018        Reason for your hospital stay       S/P RIGHT REVERSE TSA                  Who to Call     For medical emergencies, please call 911.  For non-urgent questions about your medical care, please call your primary care provider or clinic, [phone]  For questions related to your surgery, please call your surgery clinic        Attending Provider     Provider Specialty    Edd Currie MD Orthopaedic Surgery       Primary Care Provider Office Phone # Fax #    Chandrakant DOUGLAS Jonas [phone]      After Care Instructions     Diet       Follow this diet upon discharge: Orders Placed This Encounter      Advance Diet as Tolerated: Regular Diet Adult            Discharge Instructions       SEE [facility] INPATIENT DISCHARGE INSTRUCTION SHEET.            Wound care and dressings       Instructions to care for your wound at home: keep wound clean and dry, SPONGE BATHE.                  Follow-up Appointments     Follow-up and recommended  "labs and tests        Follow up with Dr. CURRIE within 10-14 DAYS  to evaluate after surgery. No follow up labs or test are needed.                  Additional Services     Home Care OT Referral for Hospital Discharge       OT to eval and treat    Your provider has ordered home care - occupational therapy. If you have not been contacted within 2 days of your discharge please call the department phone number listed on the top of this document.    S/P RIGHT REVERSE TSA.  ADLS PRN.            Home Care PT Referral for Hospital Discharge       PT to eval and treat    Your provider has ordered home care - physical therapy. If you have not been contacted within 2 days of your discharge please call the department phone number listed on the top of this document.    PLEASE FOLLOW [facility] REVERSE TOTAL SHOULDER ARTHROPLASTY PROTOCOL.  [email or web address] [phone] TO OBTAIN.            Home care nursing referral       RN skilled nursing visit. RN to assess pain level and activity tolerance.    Your provider has ordered home care nursing services. If you have not been contacted within 2 days of your discharge please call the inpatient department phone number at [phone] .                  Pending Results     No orders found from 10/23/2018 to 10/26/2018.            Statement of Approval     Ordered          10/27/18 0852  I have reviewed and agree with all the recommendations and orders detailed in this document.  EFFECTIVE NOW     Approved and electronically signed by:  Edd Currie MD             Admission Information     Date & Time Provider Department Dept. Phone    10/25/2018 Edd Currie MD [place] [address] [facility] [phone]      Your Vitals Were     Blood Pressure Pulse Temperature Respirations Height Weight    146/86 (BP Location: Left arm) 90 97.5  F (36.4  C) (Oral) 16 1.607 m (5' 3.25\") 73 kg (161 lb)    Pulse Oximetry BMI (Body Mass Index)                95% 28.29 kg/m2     " "     MyChart Information     [employer] lets you send messages to your doctor, view your test results, renew your prescriptions, schedule appointments and more. To sign up, go to [email or web address] . Click on \"Log in\" on the left side of the screen, which will take you to the Welcome page. Then click on \"Sign up Now\" on the right side of the page.     You will be asked to enter the access code listed below, as well as some personal information. Please follow the directions to create your username and password.     Your access code is: [email or web address]  Expires: 2019  8:44 AM     Your access code will  in 90 days. If you need help or a new code, please call your [facility] clinic or [phone].        Care EveryWhere ID     This is your Care EveryWhere ID. This could be used by other organizations to access your [facility] medical records  [id number]        Equal Access to Services     DEXTER SONI : Mathieu mandel Sokaylin, wayuliyada romelia, qaybta kaalmada adesu, chidi palacios . So [facility] [phone].    ATENCIÓN: Si habla español, tiene a meza disposición servicios gratuitos de asistencia lingüística. Llame al [phone].    We comply with applicable federal civil rights laws and [place] laws. We do not discriminate on the basis of race, color, national origin, age, disability, sex, sexual orientation, or gender identity.               Review of your medicines      START taking        Dose / Directions    aspirin 81 MG EC tablet        Dose:  81 mg   Take 1 tablet (81 mg) by mouth daily   Quantity:  20 tablet   Refills:  0       hydrOXYzine 10 MG tablet   Commonly known as:  ATARAX   Notes to Patient:  Every 6 hours as needed        Dose:  10 mg   Take 1 tablet (10 mg) by mouth every 6 hours as needed (SPASM)   Quantity:  45 tablet   Refills:  0       oxyCODONE IR 5 MG tablet   Commonly known as:  ROXICODONE   Notes to Patient:  As needed every 4 hours for pain        " Dose:  5-10 mg   Take 1-2 tablets (5-10 mg) by mouth every 4 hours as needed for moderate to severe pain   Quantity:  45 tablet   Refills:  0       senna-docusate 8.6-50 MG per tablet   Commonly known as:  SENOKOT-S;PERICOLACE   Notes to Patient:  As needed 2 times daily        Dose:  1 tablet   Take 1 tablet by mouth 2 times daily as needed for constipation   Quantity:  20 tablet   Refills:  0         CONTINUE these medicines which have NOT CHANGED        Dose / Directions    CALCIUM PO   Notes to Patient:  Resume        Dose:  600 mg   Take 600 mg by mouth daily   Refills:  0       carbidopa-levodopa  MG per tablet   Commonly known as:  SINEMET        Dose:  1 tablet   Take 1 tablet by mouth 2 times daily (before meals) An hour before breakfast and an hour before supper (Patient takes differently than prescribed; RX is 1 tablet three times daily)   Refills:  0       CENTRUM SILVER per tablet        Dose:  1 tablet   Take 1 tablet by mouth daily   Refills:  0       MAGNESIUM PO   Notes to Patient:  Resume        Dose:  250 mg   Take 250 mg by mouth daily   Refills:  0       TYLENOL PO   Notes to Patient:  Two times daily as needed        Dose:  500-1000 mg   Take 500-1,000 mg by mouth 2 times daily as needed for mild pain or fever   Refills:  0       VITAMIN D (CHOLECALCIFEROL) PO        Dose:  2000 Units   Take 2,000 Units by mouth daily   Refills:  0            Where to get your medicines      These medications were sent to [facility] - [address]  [address], [place] [address]     Phone:  [phone]     aspirin 81 MG EC tablet    hydrOXYzine 10 MG tablet    senna-docusate 8.6-50 MG per tablet         Some of these will need a paper prescription and others can be bought over the counter. Ask your nurse if you have questions.     Bring a paper prescription for each of these medications     oxyCODONE IR 5 MG tablet                Protect others around you:  Learn how to safely use, store and throw away your medicines at www.disposemymeds.org.        Information about OPIOIDS     PRESCRIPTION OPIOIDS: WHAT YOU NEED TO KNOW   We gave you an opioid (narcotic) pain medicine. It is important to manage your pain, but opioids are not always the best choice. You should first try all the other options your care team gave you. Take this medicine for as short a time (and as few doses) as possible.    Some activities can increase your pain, such as bandage changes or therapy sessions. It may help to take your pain medicine 30 to 60 minutes before these activities. Reduce your stress by getting enough sleep, working on hobbies you enjoy and practicing relaxation or meditation. Talk to your care team about ways to manage your pain beyond prescription opioids.    These medicines have risks:    DO NOT drive when on new or higher doses of pain medicine. These medicines can affect your alertness and reaction times, and you could be arrested for driving under the influence (DUI). If you need to use opioids long-term, talk to your care team about driving.    DO NOT operate heavy machinery    DO NOT do any other dangerous activities while taking these medicines.    DO NOT drink any alcohol while taking these medicines.     If the opioid prescribed includes acetaminophen, DO NOT take with any other medicines that contain acetaminophen. Read all labels carefully. Look for the word  acetaminophen  or  Tylenol.  Ask your pharmacist if you have questions or are unsure.    You can get addicted to pain medicines, especially if you have a history of addiction (chemical, alcohol or substance dependence). Talk to your care team about ways to reduce this risk.    All opioids tend to cause constipation. Drink plenty of water and eat foods that have a lot of fiber, such as fruits, vegetables, prune juice, apple juice and high-fiber cereal. Take a laxative (Miralax, milk of magnesia, Colace, Senna) if  you don t move your bowels at least every other day. Other side effects include upset stomach, sleepiness, dizziness, throwing up, tolerance (needing more of the medicine to have the same effect), physical dependence and slowed breathing.    Store your pills in a secure place, locked if possible. We will not replace any lost or stolen medicine. If you don t finish your medicine, please throw away (dispose) as directed by your pharmacist. The [place] Pollution Control Agency has more information about safe disposal: https://www.[employer].[facility].mn.us/living-green/managing-unwanted-medications             Medication List: This is a list of all your medications and when to take them. Check marks below indicate your daily home schedule. Keep this list as a reference.      Medications           Morning Afternoon Evening Bedtime As Needed    aspirin 81 MG EC tablet   Take 1 tablet (81 mg) by mouth daily   Last time this was given:  81 mg on 10/27/2018  8:48 AM   Next Dose Due:  10/28 a.m.            10/28 a.m.                       CALCIUM PO   Take 600 mg by mouth daily   Notes to Patient:  Resume                                carbidopa-levodopa  MG per tablet   Commonly known as:  SINEMET   Take 1 tablet by mouth 2 times daily (before meals) An hour before breakfast and an hour before supper (Patient takes differently than prescribed; RX is 1 tablet three times daily)   Last time this was given:  1 tablet on 10/27/2018  6:10 AM   Next Dose Due:  10/27 evening                 10/27 evening               CENTRUM SILVER per tablet   Take 1 tablet by mouth daily   Last time this was given:  1 tablet on 10/27/2018  8:48 AM   Next Dose Due:  10/28 a.m.            10/28 a.m.                       hydrOXYzine 10 MG tablet   Commonly known as:  ATARAX   Take 1 tablet (10 mg) by mouth every 6 hours as needed (SPASM)   Notes to Patient:  Every 6 hours as needed                            Every 6 hours as needed       MAGNESIUM  PO   Take 250 mg by mouth daily   Notes to Patient:  Resume                                oxyCODONE IR 5 MG tablet   Commonly known as:  ROXICODONE   Take 1-2 tablets (5-10 mg) by mouth every 4 hours as needed for moderate to severe pain   Last time this was given:  5 mg on 10/26/2018  5:24 PM   Notes to Patient:  As needed every 4 hours for pain                         As needed every 4 hours for pain       senna-docusate 8.6-50 MG per tablet   Commonly known as:  SENOKOT-S;PERICOLACE   Take 1 tablet by mouth 2 times daily as needed for constipation   Last time this was given:  2 tablets on 10/27/2018  8:49 AM   Notes to Patient:  As needed 2 times daily                            As needed two times daily       TYLENOL PO   Take 500-1,000 mg by mouth 2 times daily as needed for mild pain or fever   Last time this was given:  975 mg on 10/27/2018  4:19 AM   Notes to Patient:  Two times daily as needed                            2 times daily as needed       VITAMIN D (CHOLECALCIFEROL) PO   Take 2,000 Units by mouth daily   Last time this was given:  2,000 Units on 10/27/2018  8:48 AM   Next Dose Due:  10/28 a.m.            10/28 a.m.

## 2018-10-26 ENCOUNTER — APPOINTMENT (OUTPATIENT)
Dept: OCCUPATIONAL THERAPY | Facility: CLINIC | Age: 78
DRG: 483 | End: 2018-10-26
Attending: ORTHOPAEDIC SURGERY
Payer: MEDICARE

## 2018-10-26 ENCOUNTER — APPOINTMENT (OUTPATIENT)
Dept: PHYSICAL THERAPY | Facility: CLINIC | Age: 78
DRG: 483 | End: 2018-10-26
Attending: ORTHOPAEDIC SURGERY
Payer: MEDICARE

## 2018-10-26 LAB
GLUCOSE BLDC GLUCOMTR-MCNC: 120 MG/DL (ref 70–99)
HGB BLD-MCNC: 11.7 G/DL (ref 11.7–15.7)

## 2018-10-26 PROCEDURE — 40000133 ZZH STATISTIC OT WARD VISIT: Performed by: OCCUPATIONAL THERAPIST

## 2018-10-26 PROCEDURE — 25000128 H RX IP 250 OP 636: Performed by: ORTHOPAEDIC SURGERY

## 2018-10-26 PROCEDURE — 25000132 ZZH RX MED GY IP 250 OP 250 PS 637: Mod: GY | Performed by: ORTHOPAEDIC SURGERY

## 2018-10-26 PROCEDURE — 85018 HEMOGLOBIN: CPT | Performed by: ORTHOPAEDIC SURGERY

## 2018-10-26 PROCEDURE — 97535 SELF CARE MNGMENT TRAINING: CPT | Mod: GO | Performed by: OCCUPATIONAL THERAPIST

## 2018-10-26 PROCEDURE — 97116 GAIT TRAINING THERAPY: CPT | Mod: GP | Performed by: PHYSICAL THERAPIST

## 2018-10-26 PROCEDURE — A9270 NON-COVERED ITEM OR SERVICE: HCPCS | Mod: GY | Performed by: ORTHOPAEDIC SURGERY

## 2018-10-26 PROCEDURE — 97165 OT EVAL LOW COMPLEX 30 MIN: CPT | Mod: GO | Performed by: OCCUPATIONAL THERAPIST

## 2018-10-26 PROCEDURE — 36415 COLL VENOUS BLD VENIPUNCTURE: CPT | Performed by: ORTHOPAEDIC SURGERY

## 2018-10-26 PROCEDURE — 97161 PT EVAL LOW COMPLEX 20 MIN: CPT | Mod: GP | Performed by: PHYSICAL THERAPIST

## 2018-10-26 PROCEDURE — 40000193 ZZH STATISTIC PT WARD VISIT: Performed by: PHYSICAL THERAPIST

## 2018-10-26 PROCEDURE — 00000146 ZZHCL STATISTIC GLUCOSE BY METER IP

## 2018-10-26 PROCEDURE — 97110 THERAPEUTIC EXERCISES: CPT | Mod: GO | Performed by: OCCUPATIONAL THERAPIST

## 2018-10-26 PROCEDURE — 12000007 ZZH R&B INTERMEDIATE

## 2018-10-26 RX ADMIN — MULTIPLE VITAMINS W/ MINERALS TAB 1 TABLET: TAB at 08:04

## 2018-10-26 RX ADMIN — CARBIDOPA AND LEVODOPA 1 TABLET: 25; 100 TABLET ORAL at 17:23

## 2018-10-26 RX ADMIN — SENNOSIDES AND DOCUSATE SODIUM 1 TABLET: 8.6; 5 TABLET ORAL at 20:33

## 2018-10-26 RX ADMIN — ACETAMINOPHEN 975 MG: 325 TABLET, FILM COATED ORAL at 04:46

## 2018-10-26 RX ADMIN — OXYCODONE HYDROCHLORIDE 5 MG: 5 TABLET ORAL at 17:24

## 2018-10-26 RX ADMIN — VITAMIN D, TAB 1000IU (100/BT) 2000 UNITS: 25 TAB at 09:00

## 2018-10-26 RX ADMIN — OXYCODONE HYDROCHLORIDE 5 MG: 5 TABLET ORAL at 13:00

## 2018-10-26 RX ADMIN — SODIUM CHLORIDE, POTASSIUM CHLORIDE, SODIUM LACTATE AND CALCIUM CHLORIDE: 600; 310; 30; 20 INJECTION, SOLUTION INTRAVENOUS at 06:37

## 2018-10-26 RX ADMIN — ACETAMINOPHEN 975 MG: 325 TABLET, FILM COATED ORAL at 14:44

## 2018-10-26 RX ADMIN — ASPIRIN 81 MG: 81 TABLET, COATED ORAL at 08:13

## 2018-10-26 RX ADMIN — CEFAZOLIN SODIUM 1 G: 1 INJECTION, POWDER, FOR SOLUTION INTRAMUSCULAR; INTRAVENOUS at 05:47

## 2018-10-26 RX ADMIN — OXYCODONE HYDROCHLORIDE 5 MG: 5 TABLET ORAL at 08:04

## 2018-10-26 RX ADMIN — CARBIDOPA AND LEVODOPA 1 TABLET: 25; 100 TABLET ORAL at 08:04

## 2018-10-26 RX ADMIN — ACETAMINOPHEN 975 MG: 325 TABLET, FILM COATED ORAL at 20:33

## 2018-10-26 RX ADMIN — SENNOSIDES AND DOCUSATE SODIUM 1 TABLET: 8.6; 5 TABLET ORAL at 08:04

## 2018-10-26 RX ADMIN — Medication 600 MG: at 08:13

## 2018-10-26 RX ADMIN — OXYCODONE HYDROCHLORIDE 5 MG: 5 TABLET ORAL at 01:17

## 2018-10-26 ASSESSMENT — ACTIVITIES OF DAILY LIVING (ADL)
PREVIOUS_RESPONSIBILITIES: MEAL PREP;HOUSEKEEPING;LAUNDRY;SHOPPING;MEDICATION MANAGEMENT;YARDWORK;FINANCES;DRIVING
ADLS_ACUITY_SCORE: 11

## 2018-10-26 NOTE — PROGRESS NOTES
"Hospitlaist Follow Up  10/26/2018  0854    BP mildly elevated post operatively, no history of HTN pre operatively. No acute complaints today.     /77  Pulse 69  Temp 98.6  F (37  C)  Resp 16  Ht 1.607 m (5' 3.25\")  Wt 73 kg (161 lb)  SpO2 94%  BMI 28.29 kg/m2    Patient ok to discharge from IM standpoint, will defer to primary. Follow up with PCP within one week for BP check.       NAREN Agustin [facility]  Hospitalist Service  Pager: [phone] (3n - 6p)      "

## 2018-10-26 NOTE — PLAN OF CARE
Problem: Patient Care Overview  Goal: Plan of Care/Patient Progress Review  Outcome: No Change  Diagnosis: Right Reverse Arthroplasty Shoulder  POD#:1  Mental Status:A&O x4  Activity/dangle Bedrest  Diet: Reg  Pain:5/10 - Oral Oxy x1, Tylenol sched  Chacon/Voiding:Chacon adequate output  Tele/Restraints/Iso:n/a  02/LDA:2L O2, Hemovac  D/C Date:Pending      Other Info: parkinson's, cms intact, tolerating full liquids - likes ice cream.

## 2018-10-26 NOTE — PLAN OF CARE
Problem: Patient Care Overview  Goal: Plan of Care/Patient Progress Review  Discharge Planner OT   Patient plan for discharge: to stay with son with 24/7 care and home OT/PT per BPCI  Current status:  Eval completed and treat initiated. Pt admitted due to s/p R reverse total shoulder arthroplasty. Pt previously I with all ADLs and IADLs. Pt currently requires mod A to don/doff sling. Sit <> stand with CGA, pt reports feeling unsteady and requests to lay back down;per pt does not use assistive device at baseline. Pt completes R UE exercises elbow to wrist with SBA-min A. Pt completes isometric deltoid exercises with min A; reports increased pain.  Barriers to return to prior living situation: pain, balance, R UE surgical precautions  Recommendations for discharge: anticipate home with son with 24/7 care and A with all IADLs and ADLs as needed (i.e. Dressing, bathing) and home OT/PT; will continue to monitor progress and mobiity  Rationale for recommendations: Pt anticipated to improve I with ADLs and receive A from son as needed and with ADLs and all IADLs; unable to assess functional mobility and recommending PT as pt is feeling unsteady without assistive device.       Entered by: Renata Wen 10/26/2018 8:42 AM

## 2018-10-26 NOTE — PLAN OF CARE
Problem: Patient Care Overview  Goal: Plan of Care/Patient Progress Review  Discharge Planner PT   Patient plan for discharge: To son's  Current status: PT orders received, eval completed, treatment initiated. Pt is a 77yo female seen POD#1 s/p R reverse total shoulder arthroplasty. Pt typically lives alone in a house but plans to stay with son locally following surgery where son has house with stairs to enter, flight to bedroom - pt hoping her son can move a bed down to main level. Pt IND without AD at baseline.    Pt IND with bed mobility. Pt transfers sit<>stand to FWW with CGA. Pt wanting to trial cane as she reports feeling unsteady earlier. Pt optiming for NBQC instead of SEC. Pt ambulated 60' with NBQC with CGA/SBA, slow but difficulty sequencing. Pt ambulated an additional 100' x1 and 200' x1 with CGA/SBA and no AD. Pt able to ascend/descend 6 stairs with LUE rail and CGA.   Barriers to return to prior living situation: Decreased balance  Recommendations for discharge: Per BPCI careplan- Home to son's locally for 24/7 assist and Home PT/OT  Rationale for recommendations: Pt progressing well with mobility and appears safe to disch to son's with 24/7 assist for mobility and home therapy.        Entered by: Tanvi Patel 10/26/2018 10:18 AM

## 2018-10-26 NOTE — PLAN OF CARE
Problem: Patient Care Overview  Goal: Plan of Care/Patient Progress Review  Outcome: Improving  Date/Time 10/25/18 10:55 pm    Diagnosis: Right Reverse Arthroplasty Shoulder  POD#:0  Mental Status:A&O x4  Activity/dangle Bedrest  Diet:Full  Pain:5/10 - IV dilaudid x1, Oral Oxy x1, Tylenol  Chacon/Voiding:Chacon adequate output  Tele/Restraints/Iso:n/a  02/LDA:2L O2, Hemovac  D/C Date:Pending     Other Info: parkinson's, cms intact, tolerating full liquids - likes ice cream.   [place] screening tool completed (yes, no) yes

## 2018-10-26 NOTE — PROGRESS NOTES
10/26/18 0742   Quick Adds   Type of Visit Initial Occupational Therapy Evaluation   Living Environment   Lives With alone   Living Arrangements house   Home Accessibility bed and bath on same level   Transportation Available car;family or friend will provide   Self-Care   Dominant Hand right   Functional Level Prior   Ambulation 0-->independent   Transferring 0-->independent   Toileting 0-->independent   Bathing 0-->independent   Dressing 0-->independent   Eating 0-->independent   Communication 0-->understands/communicates without difficulty   Swallowing 0-->swallows foods/liquids without difficulty   Cognition 0 - no cognition issues reported   Fall history within last six months no   General Information   Onset of Illness/Injury or Date of Surgery - Date 10/25/18   Referring Physician Edd Currie MD   Patient/Family Goals Statement Per BPCI, live with son with 24/7 care while healing   Additional Occupational Profile Info/Pertinent History of Current Problem R reverse total shoulder arthroplasty   Precautions/Limitations fall precautions   Weight-Bearing Status - RUE nonweight-bearing   General Info Comments No ROM R shoulder; able to do elbow to hand ROM and deltoid isometrics   Cognitive Status Examination   Orientation orientation to person, place and time   Level of Consciousness alert   Able to Follow Commands WNL/WFL   Personal Safety (Cognitive) WNL/WFL   Memory intact   Attention No deficits were identified   Visual Perception   Visual Perception Wears glasses  (on occasion for glasses)   Sensory Examination   Sensory Comments none   Pain Assessment   Patient Currently in Pain Yes, see Vital Sign flowsheet   Range of Motion (ROM)   ROM Comment decreased ROM in L UE, R UE not tested   Strength   Strength Comments not tested due to pain with ROM in L UE, not tested in R UE due to surgical precautions   Bed Mobility Skill: Rolling/Turning   Level of [place] - Bed Mobility Skill Rolling Turning  stand-by assist   Assistive Device:  Rolling/Turning bed rails   Bed Mobility Skill: Sit to Supine   Level of [place]: Sit/Supine stand-by assist   Assistive Device: Sit/Supine bedrail   Bed Mobility Skill: Supine to Sit   Level of [place]: Supine/Sit stand-by assist   Assistive Device: Supine/Sit bedrail   Transfer Skill: Sit to Stand   Level of [place]: Sit/Stand contact guard   Balance   Balance Comments pt states she feels unsteady; referred to  PT   Upper Body Dressing   Level of [place]: Dress Upper Body moderate assist (50% patients effort)  (requires mod A to don/doff sling)   Lower Body Dressing   Level of [place]: Dress Lower Body stand-by assist   Instrumental Activities of Daily Living (IADL)   Previous Responsibilities meal prep;housekeeping;laundry;shopping;medication management;yardwork;finances;driving   Activities of Daily Living Analysis   Impairments Contributing to Impaired Activities of Daily Living pain;post surgical precautions;ROM decreased;strength decreased;balance impaired   General Therapy Interventions   Planned Therapy Interventions ADL retraining;strengthening;ROM;transfer training;home program guidelines   Clinical Impression   Criteria for Skilled Therapeutic Interventions Met yes, treatment indicated   OT Diagnosis Decreased I with ADLs and functional mobility   Influenced by the following impairments pain, post surgical precautions, decreased strength, decreased ROM   Assessment of Occupational Performance 1-3 Performance Deficits   Identified Performance Deficits decreased I with ADLs such as dressing, bathing, toileting and functional mobility    Clinical Decision Making (Complexity) Low complexity   Therapy Frequency 2 times/day   Predicted Duration of Therapy Intervention (days/wks) 4 days   Anticipated Equipment Needs at Discharge hospital bed  (pt reports wanting to rent hospital bed due to steep stairs)   Anticipated Discharge Disposition Other (see  "comments)  (staying with son with 24/7 care per BPCI)   Risks and Benefits of Treatment have been explained. Yes   Patient, Family & other staff in agreement with plan of care Yes   [facility] AM-PAC  \"6 Clicks\" Daily Activity Inpatient Short Form   1. Putting on and taking off regular lower body clothing? 3 - A Little   2. Bathing (including washing, rinsing, drying)? 2 - A Lot   3. Toileting, which includes using toilet, bedpan or urinal? 3 - A Little   4. Putting on and taking off regular upper body clothing? 2 - A Lot   5. Taking care of personal grooming such as brushing teeth? 3 - A Little   6. Eating meals? 4 - None   Daily Activity Raw Score (Score out of 24.Lower scores equate to lower levels of function) 17   Total Evaluation Time   Total Evaluation Time (Minutes) 15     "

## 2018-10-26 NOTE — PLAN OF CARE
Problem: Patient Care Overview  Goal: Plan of Care/Patient Progress Review  OT: spoke with MONTEIRO; pt plans to discharge to TCU. Reduce therapy frequency to 1x/day.

## 2018-10-26 NOTE — PROGRESS NOTES
10/26/18 0930   Quick Adds   Type of Visit Initial PT Evaluation   Living Environment   Lives With alone   Living Arrangements house   Home Accessibility bed and bath on same level   Transportation Available car;family or friend will provide   Living Environment Comment Per [facility] careplan, plan to stay with son locally in [place] for 24/7 assist where he has steps to enter, flight up to bedroom but hoping to move bed down to main level to avoid stairs as possible.   Self-Care   Dominant Hand right   Usual Activity Tolerance good   Current Activity Tolerance moderate   Equipment Currently Used at Home none   Functional Level Prior   Ambulation 0-->independent   Transferring 0-->independent   Toileting 0-->independent   Bathing 0-->independent   Dressing 0-->independent   Eating 0-->independent   Communication 0-->understands/communicates without difficulty   Swallowing 0-->swallows foods/liquids without difficulty   Cognition 0 - no cognition issues reported   Fall history within last six months no   Which of the above functional risks had a recent onset or change? ambulation;transferring   General Information   Onset of Illness/Injury or Date of Surgery - Date 10/25/18   Referring Physician Edd Currie MD   Patient/Family Goals Statement To stay with son for 24/7 assist   Pertinent History of Current Problem (include personal factors and/or comorbidities that impact the POC) Pt is a 79yo female seen POD#1 s/p R reverse total shoulder arthroplasty. PMH significant for PD   Precautions/Limitations fall precautions   Weight-Bearing Status - RUE nonweight-bearing   General Observations Pt resting in bed, RUE in immobilizer   Cognitive Status Examination   Orientation orientation to person, place and time   Level of Consciousness alert   Follows Commands and Answers Questions 100% of the time;able to follow multistep instructions   Personal Safety and Judgment intact   Pain Assessment   Patient Currently in  "Pain No   Posture    Posture Not impaired   Range of Motion (ROM)   ROM Comment BLE WNL with AROM   Strength   Strength Comments BLE 5/5   Bed Mobility   Bed Mobility Comments IND supine>sit   Transfer Skills   Transfer Comments CGA sit>stand needign Nolvia with initial stance   Gait   Gait Comments 10' with NBQC and Nolvia for balance   Balance   Balance Comments single UE suport and CGA for static stance, Nolvia for dynamic balance   Sensory Examination   Sensory Perception Comments Denies N/T   General Therapy Interventions   Planned Therapy Interventions balance training;gait training;transfer training;progressive activity/exercise   Clinical Impression   Criteria for Skilled Therapeutic Intervention yes, treatment indicated   PT Diagnosis Impaired gait   Influenced by the following impairments Decreased balance, decreased activity tolerance   Functional limitations due to impairments Decreased safety and IND with functional transfers, gait, stairs   Clinical Presentation Stable/Uncomplicated   Clinical Presentation Rationale clinically improving   Clinical Decision Making (Complexity) Low complexity   Therapy Frequency` daily   Predicted Duration of Therapy Intervention (days/wks) 2 days   Anticipated Discharge Disposition Home with Assist;Home with Home Therapy  (Per [facility] careplan- Home to son's for 24/7 assist and HHPT/OT)   Risk & Benefits of therapy have been explained Yes   Patient, Family & other staff in agreement with plan of care Yes   [facility] [employer] \"6 Clicks\"   2016, Trustees of [facility], under license to [employer].  All rights reserved.   6 Clicks Short Forms Basic Mobility Inpatient Short Form   [facility] [employer]  \"6 Clicks\" V.2 Basic Mobility Inpatient Short Form   1. Turning from your back to your side while in a flat bed without using bedrails? 4 - None   2. Moving from lying on your back to sitting on the side of a flat bed without using bedrails? 4 - None   3. Moving " to and from a bed to a chair (including a wheelchair)? 3 - A Little   4. Standing up from a chair using your arms (e.g., wheelchair, or bedside chair)? 3 - A Little   5. To walk in hospital room? 3 - A Little   6. Climbing 3-5 steps with a railing? 3 - A Little   Basic Mobility Raw Score (Score out of 24.Lower scores equate to lower levels of function) 20   Total Evaluation Time   Total Evaluation Time (Minutes) 10

## 2018-10-26 NOTE — PLAN OF CARE
Problem: Patient Care Overview  Goal: Plan of Care/Patient Progress Review  Outcome: Improving  Pt doing well today, CMS intact up with SBA of 1.  Pt's HVC intact dressing dry and intact.  Pt is taking oxycodone for good pain relief.  Pt is progressing towards her goals.

## 2018-10-26 NOTE — PROGRESS NOTES
ORTHO  MODERATE PAIN  AFVSS  R UE:  DRESSING CDI.  CMS INTACT.  SLING IN PLACE.  HVC PATENT.  XRAY GOOD ALIGNMENT  HB PENDING  POD #1  IV ABX  ECASA  PT/OT  PAIN CONTROL  DISCHARGE PLANNING

## 2018-10-27 ENCOUNTER — APPOINTMENT (OUTPATIENT)
Dept: PHYSICAL THERAPY | Facility: CLINIC | Age: 78
DRG: 483 | End: 2018-10-27
Attending: ORTHOPAEDIC SURGERY
Payer: MEDICARE

## 2018-10-27 ENCOUNTER — APPOINTMENT (OUTPATIENT)
Dept: OCCUPATIONAL THERAPY | Facility: CLINIC | Age: 78
DRG: 483 | End: 2018-10-27
Attending: ORTHOPAEDIC SURGERY
Payer: MEDICARE

## 2018-10-27 VITALS
OXYGEN SATURATION: 96 % | TEMPERATURE: 98 F | BODY MASS INDEX: 28.53 KG/M2 | HEART RATE: 89 BPM | WEIGHT: 161 LBS | HEIGHT: 63 IN | RESPIRATION RATE: 16 BRPM | SYSTOLIC BLOOD PRESSURE: 132 MMHG | DIASTOLIC BLOOD PRESSURE: 72 MMHG

## 2018-10-27 LAB — HGB BLD-MCNC: 12.3 G/DL (ref 11.7–15.7)

## 2018-10-27 PROCEDURE — 85018 HEMOGLOBIN: CPT | Performed by: ORTHOPAEDIC SURGERY

## 2018-10-27 PROCEDURE — 97535 SELF CARE MNGMENT TRAINING: CPT | Mod: GO | Performed by: OCCUPATIONAL THERAPIST

## 2018-10-27 PROCEDURE — 40000193 ZZH STATISTIC PT WARD VISIT

## 2018-10-27 PROCEDURE — 97110 THERAPEUTIC EXERCISES: CPT | Mod: GO | Performed by: OCCUPATIONAL THERAPIST

## 2018-10-27 PROCEDURE — 36415 COLL VENOUS BLD VENIPUNCTURE: CPT | Performed by: ORTHOPAEDIC SURGERY

## 2018-10-27 PROCEDURE — 40000133 ZZH STATISTIC OT WARD VISIT: Performed by: OCCUPATIONAL THERAPIST

## 2018-10-27 PROCEDURE — 97116 GAIT TRAINING THERAPY: CPT | Mod: GP

## 2018-10-27 PROCEDURE — A9270 NON-COVERED ITEM OR SERVICE: HCPCS | Mod: GY | Performed by: ORTHOPAEDIC SURGERY

## 2018-10-27 PROCEDURE — 25000132 ZZH RX MED GY IP 250 OP 250 PS 637: Mod: GY | Performed by: ORTHOPAEDIC SURGERY

## 2018-10-27 RX ORDER — AMOXICILLIN 250 MG
1 CAPSULE ORAL 2 TIMES DAILY PRN
Qty: 20 TABLET | Refills: 0 | Status: SHIPPED | OUTPATIENT
Start: 2018-10-27 | End: 2023-01-01

## 2018-10-27 RX ORDER — HYDROXYZINE HYDROCHLORIDE 10 MG/1
10 TABLET, FILM COATED ORAL EVERY 6 HOURS PRN
Qty: 45 TABLET | Refills: 0 | Status: SHIPPED | OUTPATIENT
Start: 2018-10-27 | End: 2023-01-01

## 2018-10-27 RX ORDER — OXYCODONE HYDROCHLORIDE 5 MG/1
5-10 TABLET ORAL EVERY 4 HOURS PRN
Qty: 45 TABLET | Refills: 0 | Status: SHIPPED | OUTPATIENT
Start: 2018-10-27 | End: 2023-01-01

## 2018-10-27 RX ADMIN — Medication 400 MG: at 08:48

## 2018-10-27 RX ADMIN — Medication 600 MG: at 08:48

## 2018-10-27 RX ADMIN — MULTIPLE VITAMINS W/ MINERALS TAB 1 TABLET: TAB at 08:48

## 2018-10-27 RX ADMIN — ASPIRIN 81 MG: 81 TABLET, COATED ORAL at 08:48

## 2018-10-27 RX ADMIN — ACETAMINOPHEN 975 MG: 325 TABLET, FILM COATED ORAL at 04:19

## 2018-10-27 RX ADMIN — SENNOSIDES AND DOCUSATE SODIUM 2 TABLET: 8.6; 5 TABLET ORAL at 08:49

## 2018-10-27 RX ADMIN — ACETAMINOPHEN 975 MG: 325 TABLET, FILM COATED ORAL at 12:48

## 2018-10-27 RX ADMIN — VITAMIN D, TAB 1000IU (100/BT) 2000 UNITS: 25 TAB at 08:48

## 2018-10-27 RX ADMIN — CARBIDOPA AND LEVODOPA 1 TABLET: 25; 100 TABLET ORAL at 06:10

## 2018-10-27 ASSESSMENT — ACTIVITIES OF DAILY LIVING (ADL)
ADLS_ACUITY_SCORE: 11
ADLS_ACUITY_SCORE: 11
ADLS_ACUITY_SCORE: 10
ADLS_ACUITY_SCORE: 11

## 2018-10-27 NOTE — PLAN OF CARE
Problem: Patient Care Overview  Goal: Plan of Care/Patient Progress Review  Outcome: Therapy, progress toward functional goals as expected  Discharge Planner OT   Patient plan for discharge: home with son and daughter in law to assist  Current status: pt needed mod A with LE dressing, I with toileting except no pants to manage at the time. Grooming with mod I.   Barriers to return to prior living situation: increased level of assist needed  Recommendations for discharge: home with family assist for dressing, bathing and IADL's such as cooking, driving, laundry  Rationale for recommendations: pt needs some assist due to surgical precautions. Family is able to help       Entered by: Iqra Lujan 10/27/2018 10:22 AM

## 2018-10-27 NOTE — PROGRESS NOTES
Care Coordination:    Faxed Home care orders to [facility].   Placed call to [facility] to confirm orders are received and POC, patients primary PCP is from [place]. [facility] is aware of all and if needed will call Orthopedic MD for any orders other orders needed, they did receive fax from me.    Danielle Li RN BSN  [facility] Care Coordinator  Phone [phone]

## 2018-10-27 NOTE — PLAN OF CARE
Problem: Patient Care Overview  Goal: Plan of Care/Patient Progress Review  Outcome: Improving  POD#:2. A&O x4. Up with SBA of 1. Diet: Regular. Pain: PO Oxy, Tylenol scheduled. Voiding with adequate output. VSS on RA, Hemovac.Hx parkinson's, cms intact, tolerating regular diet, /84, not on BP meds. PRN hydralazine available for SBP>160.

## 2018-10-27 NOTE — PLAN OF CARE
Problem: Patient Care Overview  Goal: Plan of Care/Patient Progress Review  Discharge Planner PT   Patient plan for discharge: Son's home with 24/7 assist  Current status: Pt supervision for sit <> stand without assistive device. Gait x250' with supervision and no assistive device, decreased endurance and mild unsteadiness noted. Performed 10 stairs with unilateral hand rail 2x, progressing from CGA to supervision - recommended son be with her on stairs for safety and instructed in how to assist. Pt comfortable with discontinuation of inpatient PT at this time.  Barriers to return to prior living situation: decreased balance and endurance.  Recommendations for discharge: Per [facility] careplan- Home to son's locally for 24/7 assist and Home PT/OT  Rationale for recommendations: Pt progressing well with mobility and appears safe to disch to son's with 24/7 assist for mobility and home therapy.        Entered by: Ute Paul 10/27/2018 8:54 AM         Physical Therapy Discharge Summary    Reason for therapy discharge:    All goals and outcomes met, no further needs identified.    Progress towards therapy goal(s). See goals on Care Plan in [facility] electronic health record for goal details.  Goals met    Therapy recommendation(s):    Continued therapy is recommended.  Rationale/Recommendations:  to progress balance and endurance for independence in functional mobility.

## 2018-10-27 NOTE — DISCHARGE SUMMARY
Admit Date:     10/25/2018   Discharge Date:           ADMITTING DIAGNOSIS:  Right rotator cuff tear arthropathy.      PROCEDURES PERFORMED:  Right reverse total shoulder arthroplasty.      HOSPITAL COURSE:  Ms. Venegas was admitted to the inpatient chavarria on 55 following uneventful surgery.  Her postoperative course was relatively smooth.  On postoperative day #1, she was tolerating a general diet and oral pain medication.  She underwent standard physical therapy for deltoid isometrics, elbow and wrist range of motion.        DISPOSITION:  She was discharged to home in stable condition with home healthcare services on postoperative day #2.      DISCHARGE MEDICATIONS:  Oxycodone, Vistaril, Senokot and baby aspirin daily x 3 weeks.      DISCHARGE INSTRUCTIONS:  She is to keep the bandage clean and dry and sponge bathe.  UltraSling x 6 weeks for protection.  Standard phase 1 reverse total shoulder arthroplasty protocol for physical therapy.  I will see her back in 10-14 days for suture removal, at which time she should have a true AP and outlet radiograph of the right shoulder.         EDD MICHAEL MD             D: 10/27/2018   T: 10/27/2018   MT: NTS      Name:     FLAQUITO VENEGAS   MRN:      [id number]        Account:        [id number]   :      1940           Admit Date:     10/25/2018                                  Discharge Date:       Document: [id number]       cc: Edd Mcdaniel MD

## 2018-10-27 NOTE — PLAN OF CARE
Problem: Patient Care Overview  Goal: Plan of Care/Patient Progress Review  Outcome: Improving  VSS, dressing and drain intact. Taking oxycodone and tylenol for pain control. CMS intact.

## 2018-10-27 NOTE — PROGRESS NOTES
Pt A/O X4. CMS intact. VSS on RA. Standby assist. Regular diet. Voiding in BR. Hemovac removed, WDL. Dressing changed, WDL. Scheduled tylenol for pain. Reviewed discharge instructions with patient and family, no questions or concerns at this time. Discharged at 1500.

## 2018-10-27 NOTE — PROGRESS NOTES
ORTHO  FEELING BETTER  AFVSS  R UE:  DRESSING CDI.  CMS INTACT.  SLING IN PLACE.  HVC PATENT.  HB 11+  POD #2  ECASA  PT/OT  DISCHARGE PLANNING

## 2021-01-31 ENCOUNTER — HOSPITAL ENCOUNTER (EMERGENCY)
Facility: CLINIC | Age: 81
Discharge: HOME | End: 2021-01-31
Attending: EMERGENCY MEDICINE | Admitting: EMERGENCY MEDICINE
Payer: MEDICARE

## 2021-01-31 ENCOUNTER — APPOINTMENT (OUTPATIENT)
Dept: ULTRASOUND IMAGING | Facility: CLINIC | Age: 81
End: 2021-01-31
Attending: EMERGENCY MEDICINE
Payer: MEDICARE

## 2021-01-31 VITALS
SYSTOLIC BLOOD PRESSURE: 132 MMHG | DIASTOLIC BLOOD PRESSURE: 81 MMHG | HEART RATE: 76 BPM | RESPIRATION RATE: 16 BRPM | WEIGHT: 165 LBS | OXYGEN SATURATION: 97 % | HEIGHT: 64 IN | BODY MASS INDEX: 28.17 KG/M2 | TEMPERATURE: 97.9 F

## 2021-01-31 DIAGNOSIS — M79.662 PAIN OF LEFT LOWER LEG: ICD-10-CM

## 2021-01-31 PROCEDURE — 93971 EXTREMITY STUDY: CPT | Mod: RT

## 2021-01-31 PROCEDURE — 99284 EMERGENCY DEPT VISIT MOD MDM: CPT | Mod: 25

## 2021-01-31 ASSESSMENT — ENCOUNTER SYMPTOMS
DIFFICULTY URINATING: 1
APPETITE CHANGE: 0
ARTHRALGIAS: 1

## 2021-01-31 ASSESSMENT — MIFFLIN-ST. JEOR: SCORE: 1198.44

## 2021-01-31 NOTE — ED NOTES
Report received. Patient visualized. Patients vital signs are stable. Patient was concerned that her foot pain was not evaluated by ultrasound. Patient informed that we evaluated the primary source of the pain and the foot pain could be related to the knee pain as well. Will continue to monitor.

## 2021-01-31 NOTE — ED PROVIDER NOTES
History   Chief Complaint:  Leg Pain     The history is provided by the patient.      Jennifer Venegas is a 80 year old female with history of parkinsonism, who presents with leg pain. The patient notes that for the past month she has had right lower leg pain. She states that she is worried about a blood clot after her son googled possible causes of pain and that she has a podiatrist appointment next week.  She reports that her leg pain hurts when she is using it on the lower right leg on the posterior side. She denies injury. She denies pain behind the knee. She denies fever, sore throat, or cough, or COVID exposures. She denies BM or stool changes or appetite change. She does state that she has some urinary leakage. She reports tylenol for the pain around 1-3 times a day.     Review of Systems   Constitutional: Negative for appetite change.   Genitourinary: Positive for difficulty urinating.   Musculoskeletal: Positive for arthralgias (right leg pain).   All other systems reviewed and are negative.      Allergies:  Seasonal Allergies    Medications:  Aspirin 81 mg   Sinemet   Atarax  Senna    Past Medical History:    Anemia   Arthritis   Basel cell carcinoma  Cervicalgia  Hyperlipidemia  Lumbago   Osteoarthritis   Parkinson's disease  Resting tremor  Right leg weakness   Varicose vein of leg  Rotator cuff tear arthropathy, right     Past Surgical History:    Appendectomy   Colonoscopy   Blepharoplasty   Ligation of fallopian tubes  Hernia repair   Knee arthroplasty   Arthroscopy of shoulder right     Family History:    Cancer, brother   Heart disease, brother   Prostate cancer, father   Neurology, mother parkinson's and dementia  Hypertension, father     Social History:  Smoking status: never smoker  Alcohol use: yes  Drug use: no  PCP: LUPIS ROMO  Presents to the ED with son    Physical Exam     Patient Vitals for the past 24 hrs:   BP Temp Temp src Pulse Resp SpO2 Height Weight   01/31/21 1200 -- -- --  "-- -- 97 % -- --   01/31/21 1155 -- -- -- -- -- 97 % -- --   01/31/21 1150 132/81 -- -- 76 -- 96 % -- --   01/31/21 0951 (!) 142/68 97.9  F (36.6  C) Oral 98 16 98 % 1.626 m (5' 4\") 74.8 kg (165 lb)       Physical Exam  Physical Exam   Constitutional:  Patient is oriented to person, place, and time. They appear well-developed and well-nourished. Mild distress secondary to right leg pain.    HENT:   Mouth/Throat:   Oropharynx is clear and moist.   Eyes:    Conjunctivae normal and EOM are normal. Pupils are equal, round, and reactive to light.   Neck:    Normal range of motion.   Cardiovascular: Normal rate, regular rhythm and normal heart sounds.  Exam reveals no gallop and no friction rub.  No murmur heard.  Pulmonary/Chest:  Effort normal and breath sounds normal. Patient has no wheezes. Patient has no rales.   Abdominal:   Soft. Bowel sounds are normal. Patient exhibits no mass. There is no tenderness. There is no rebound and no guarding.   Musculoskeletal:  Normal range of motion. Patient exhibits no edema.   Neurological:   Patient is alert and oriented to person, place, and time. Patient has normal strength. No cranial nerve deficit or sensory deficit. GCS 15.   Skin:   Skin is warm and dry. No rash noted. No erythema. Right distal leg has painful mild swelling just above achilles tendon about 5 cm by 2 cm, no fluctuance or firmness. No  Ankle/knee or hip pain, no erythema or warmth.   Psychiatric:   Patient has a normal mood    Emergency Department Course     Imaging:  US Lower extremity venous:   Negative for deep venous thrombosis in the right lower extremity.    Reading per radiology     Laboratory:  None Needed    Emergency Department Course:  Reviewed:  I reviewed the patient's nursing notes, vitals, past medical records, Care Everywhere.     Assessments:  1009 I performed an assessment and examination of the patient as noted above.     1211 Findings and plan explained to the Patient and son. Patient " discharged home with instructions regarding supportive care, medications, and reasons to return. The importance of close follow-up was reviewed.        Disposition:  The patient was discharged to home.       Impression & Plan   Medical Decision Making:  Jennifer Venegas is a 80 year old female who presents for evaluation of leg swelling. A broad differential was considered including Baker's cyst rupture, Baker's cyst, hematoma, rupture, compartment syndrome, muscle rupture, DVT, superficial thrombophlebitis, compression of the venous structures higher up in the abdomen and or leg cellulitis/ abscess, etc. Ultrasound is negative.  There are no signs of compartment syndrome or other worrisome etiologies at this point so outpatient management is indicated.  I doubt more central causes of their swelling like renal failure, chf, liver disease, etc. They will elevate leg, ice regularly, ibuprofen 400 mg every 6 hours for pain, and avoid strenuous activity. They should followup with her primary care doctor.     Diagnosis:    ICD-10-CM    1. Pain of left lower leg  M79.662        Discharge Medications:  New Prescriptions    No medications on file       Scribe Disclosure:  Jana NOGUEIRA, am serving as a scribe at 10:09 AM on 1/31/2021 to document services personally performed by Shauna Rosa MD based on my observations and the provider's statements to me.             Shauna Rosa MD  01/31/21 1074

## 2023-01-01 ENCOUNTER — ASSISTED LIVING VISIT (OUTPATIENT)
Dept: GERIATRICS | Facility: CLINIC | Age: 83
End: 2023-01-01
Payer: COMMERCIAL

## 2023-01-01 ENCOUNTER — DOCUMENTATION ONLY (OUTPATIENT)
Dept: OTHER | Facility: CLINIC | Age: 83
End: 2023-01-01
Payer: COMMERCIAL

## 2023-01-01 ENCOUNTER — MEDICAL CORRESPONDENCE (OUTPATIENT)
Dept: HEALTH INFORMATION MANAGEMENT | Facility: CLINIC | Age: 83
End: 2023-01-01

## 2023-01-01 VITALS
WEIGHT: 155 LBS | OXYGEN SATURATION: 96 % | BODY MASS INDEX: 26.46 KG/M2 | RESPIRATION RATE: 20 BRPM | SYSTOLIC BLOOD PRESSURE: 141 MMHG | DIASTOLIC BLOOD PRESSURE: 99 MMHG | HEART RATE: 96 BPM | TEMPERATURE: 97.7 F | HEIGHT: 64 IN

## 2023-01-01 DIAGNOSIS — R52 GENERALIZED PAIN: ICD-10-CM

## 2023-01-01 DIAGNOSIS — F03.90 SENILE DEMENTIA WITHOUT BEHAVIORAL DISTURBANCE (H): ICD-10-CM

## 2023-01-01 DIAGNOSIS — I48.0 PAROXYSMAL ATRIAL FIBRILLATION (H): Primary | ICD-10-CM

## 2023-01-01 DIAGNOSIS — Z53.9 DIAGNOSIS NOT YET DEFINED: Primary | ICD-10-CM

## 2023-01-01 DIAGNOSIS — G20.B2 PARKINSON'S DISEASE WITH DYSKINESIA AND FLUCTUATING MANIFESTATIONS (H): ICD-10-CM

## 2023-01-01 DIAGNOSIS — F03.90 SENILE DEMENTIA WITHOUT BEHAVIORAL DISTURBANCE (H): Primary | ICD-10-CM

## 2023-01-01 DIAGNOSIS — I48.0 PAROXYSMAL ATRIAL FIBRILLATION (H): ICD-10-CM

## 2023-01-01 PROCEDURE — G0180 MD CERTIFICATION HHA PATIENT: HCPCS | Performed by: NURSE PRACTITIONER

## 2023-01-01 PROCEDURE — 99344 HOME/RES VST NEW MOD MDM 60: CPT | Performed by: NURSE PRACTITIONER

## 2023-01-01 RX ORDER — ACETAMINOPHEN 500 MG
1000 TABLET ORAL 2 TIMES DAILY
COMMUNITY
End: 2023-01-01

## 2023-01-01 RX ORDER — ACETAMINOPHEN 500 MG
TABLET ORAL
Qty: 264 TABLET | Refills: 11 | Status: SHIPPED | OUTPATIENT
Start: 2023-01-01 | End: 2024-01-01

## 2023-01-01 RX ORDER — CARBIDOPA AND LEVODOPA 50; 200 MG/1; MG/1
1 TABLET, EXTENDED RELEASE ORAL AT BEDTIME
COMMUNITY
End: 2024-01-01

## 2023-01-01 RX ORDER — DONEPEZIL HYDROCHLORIDE 10 MG/1
10 TABLET, FILM COATED ORAL DAILY
COMMUNITY
End: 2023-01-01

## 2023-01-01 RX ORDER — DONEPEZIL HYDROCHLORIDE 10 MG/1
10 TABLET, FILM COATED ORAL DAILY
Qty: 30 TABLET | Refills: 11 | Status: SHIPPED | OUTPATIENT
Start: 2023-01-01 | End: 2024-01-01

## 2023-12-19 NOTE — PROGRESS NOTES
"[facility]  PRIMARY CARE PROVIDER AND CLINIC:  Sean Singh, APRN CNP, [address] [facility] [address]  Chief Complaint   Patient presents with    [facility]     [facility] Record Number:  [id number]  Place of Service where encounter took place:  [facility] [[id number]]    Jennifer Venegas  is a 83 year old  (1940),  admitted to the above facility on 12/14/23 .  Admitted to this facility for  rehab, medical management, and nursing care.    HPI:    HPI information obtained from: facility chart records, facility staff, patient report, and [facility] chart review.   Brief Summary of Hospital Course: Had a fall and was down for several hours. 11/19-11/21. Had traumatic rhabdo from the fall and being down, noted to be failing to thrive, dementia, progressing parkinson's disease.   Updates on Status Since Skilled nursing Admission:   Settling into AL but says will change apartments in the building  Seen today for start of care. Prior was in TCU and then moved into AL. Came from a home in [facility] but moved here to be near her son Philip. \"I'm not allowed to drive a car so need transportation\". Has other children that live out of state but not able to give additional detail. Working with therapy but doesn't remember. They will hold care as she is taking a MARGO with family. Ambulates with 4WW. Slight tremor noted in both arms and hands. Hx of Parkinson's. Denies any pain, no abdominal pain. Bowel movement today, soft formed. Says she is sleeping ok, has a hospital bed.     CODE STATUS/ADVANCE DIRECTIVES DISCUSSION:   CPR/Full code   Patient's living condition: lives in an assisted living facility  ALLERGIES: Other [seasonal allergies]  PAST MEDICAL HISTORY:  has a past medical history of Acne rosacea, Anemia (1989), Arthritis, Basal cell carcinoma, Cataract, unspecified cataract type, unspecified laterality, Cervicalgia, Degeneration " "of cervical intervertebral disc (06/1999), Hearing loss, Hyperlipidemia, Lumbago, Osteoarthrosis, Pain in right shoulder, Parkinson's disease, Resting tremor, Rhinitis, allergic, Right leg weakness, and Varicose vein of leg.  PAST SURGICAL HISTORY:   has a past surgical history that includes appendectomy (1964); colonoscopy (10/31/2006); orthopedic surgery (Right, 2005); orthopedic surgery (Left, 04/10/2017); GYN surgery (1974); hernia repair (1964); Sigmoidoscopy flexible (10/2000); Eye surgery (10/05/2011); and Reverse arthroplasty shoulder (Right, 10/25/2018).  FAMILY HISTORY: family history includes Dementia in her mother; Heart Disease in her brother; Hypertension in her father; Neurologic Disorder in her mother; Parkinsonism in her mother; Prostate Cancer in her brother and father.  SOCIAL HISTORY:   reports that she has never smoked. She has never used smokeless tobacco. She reports current alcohol use. She reports that she does not use drugs.    Post Discharge Medication Reconciliation Status: discharge medications reconciled and changed, per note/orders    Current Outpatient Medications   Medication Sig Dispense Refill    Acetaminophen (TYLENOL PO) Take 1,000 mg by mouth 2 times daily And 1,000mg once daily as needed      apixaban ANTICOAGULANT (ELIQUIS) 5 MG tablet Take 5 mg by mouth 2 times daily      carbidopa-levodopa (SINEMET CR)  MG CR tablet Take 1 tablet by mouth at bedtime      carbidopa-levodopa (SINEMET)  MG per tablet Take 1 tablet by mouth 3 times daily      donepezil (ARICEPT) 10 MG tablet Take 10 mg by mouth daily      Menthol, Topical Analgesic, (BIOFREEZE) 4 % GEL Externally apply topically every 6 hours as needed       ROS:  4 point ROS including Respiratory, CV, GI and , other than that noted in the HPI,  is negative    Vitals:  BP (!) 141/99   Pulse 96   Temp 97.7  F (36.5  C)   Resp 20   Ht 1.626 m (5' 4\")   Wt 70.3 kg (155 lb)   SpO2 96%   BMI 26.61 kg/m  "   Exam:  GENERAL APPEARANCE:  Alert, in no distress  ENT:  Mouth and posterior oropharynx normal, moist mucous membranes  EYES:  EOM, conjunctivae, lids, pupils and irises normal  NECK:  no adenopathy  RESP:  lungs clear to auscultation , diminished breath sounds throughout  CV:  Palpation and auscultation of heart done , regular rate and rhythm, no murmur, rub, or gallop, trace edema  ABDOMEN:  bowel sounds normal  M/S:   ambulates with 4WW  SKIN:  dry skin  NEURO:   Cranial nerves 2-12 are normal tested and grossly at patient's baseline  PSYCH:  insight and judgement impaired, memory impaired     Lab/Diagnostic data: reviewed in Epic    ASSESSMENT/PLAN:  (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 NSR with recent EKG  Plan:   -Zio patch once returning from holiday MARGO  -continue Eliquis 5 mg po BID for now    (G20.B2) Parkinson's disease with dyskinesia and fluctuating manifestations  Comment: chronic   Plan:   -Sinemet QID  -follows with [place] but family would like a referral to neurology in [place]  -referral to [place] clinic       Total time spent with patient visit at the [facility] nursing facility was greater than 64 minutes including patient visit, review of past records, phone call to patient contact, and reviewed therapy plans, reviewed with nursing, review with cardiology for Zip patch    Electronically signed by:  NAREN Rothman CNP

## 2023-12-20 PROBLEM — F03.90 SENILE DEMENTIA WITHOUT BEHAVIORAL DISTURBANCE (H): Status: ACTIVE | Noted: 2023-01-01

## 2023-12-20 NOTE — LETTER
"    12/20/2023        RE: Jennifer Venegas  C/o Jana [address]oll[address]  [address] [place] [address]        [facility]  PRIMARY CARE PROVIDER AND CLINIC:  NAREN Rothman [facility], [address] / [facility] [address]  Chief Complaint   Patient presents with     [facility]     [facility] Record Number:  [id number]  Place of Service where encounter took place:  [facility] [[id number]]    Jennifer Venegas  is a 83 year old  (1940),  admitted to the above facility on 12/14/23 .  Admitted to this facility for  rehab, medical management, and nursing care.    HPI:    HPI information obtained from: facility chart records, facility staff, patient report, and [facility] chart review.   Brief Summary of Hospital Course: Had a fall and was down for several hours. 11/19-11/21. Had traumatic rhabdo from the fall and being down, noted to be failing to thrive, dementia, progressing parkinson's disease.   Updates on Status Since Skilled nursing Admission:   Settling into AL but says will change apartments in the building  Seen today for start of care. Prior was in TCU and then moved into AL. Came from a home in [facility] but moved here to be near her son Philip. \"I'm not allowed to drive a car so need transportation\". Has other children that live out of state but not able to give additional detail. Working with therapy but doesn't remember. They will hold care as she is taking a MARGO with family. Ambulates with 4WW. Slight tremor noted in both arms and hands. Hx of Parkinson's. Denies any pain, no abdominal pain. Bowel movement today, soft formed. Says she is sleeping ok, has a hospital bed.     CODE STATUS/ADVANCE DIRECTIVES DISCUSSION:   CPR/Full code   Patient's living condition: lives in an assisted living facility  ALLERGIES: Other [seasonal allergies]  PAST MEDICAL HISTORY:  has a past medical history of Acne rosacea, Anemia " (1989), Arthritis, Basal cell carcinoma, Cataract, unspecified cataract type, unspecified laterality, Cervicalgia, Degeneration of cervical intervertebral disc (06/1999), Hearing loss, Hyperlipidemia, Lumbago, Osteoarthrosis, Pain in right shoulder, Parkinson's disease, Resting tremor, Rhinitis, allergic, Right leg weakness, and Varicose vein of leg.  PAST SURGICAL HISTORY:   has a past surgical history that includes appendectomy (1964); colonoscopy (10/31/2006); orthopedic surgery (Right, 2005); orthopedic surgery (Left, 04/10/2017); GYN surgery (1974); hernia repair (1964); Sigmoidoscopy flexible (10/2000); Eye surgery (10/05/2011); and Reverse arthroplasty shoulder (Right, 10/25/2018).  FAMILY HISTORY: family history includes Dementia in her mother; Heart Disease in her brother; Hypertension in her father; Neurologic Disorder in her mother; Parkinsonism in her mother; Prostate Cancer in her brother and father.  SOCIAL HISTORY:   reports that she has never smoked. She has never used smokeless tobacco. She reports current alcohol use. She reports that she does not use drugs.    Post Discharge Medication Reconciliation Status: discharge medications reconciled and changed, per note/orders    Current Outpatient Medications   Medication Sig Dispense Refill     Acetaminophen (TYLENOL PO) Take 1,000 mg by mouth 2 times daily And 1,000mg once daily as needed       apixaban ANTICOAGULANT (ELIQUIS) 5 MG tablet Take 5 mg by mouth 2 times daily       carbidopa-levodopa (SINEMET CR)  MG CR tablet Take 1 tablet by mouth at bedtime       carbidopa-levodopa (SINEMET)  MG per tablet Take 1 tablet by mouth 3 times daily       donepezil (ARICEPT) 10 MG tablet Take 10 mg by mouth daily       Menthol, Topical Analgesic, (BIOFREEZE) 4 % GEL Externally apply topically every 6 hours as needed       ROS:  4 point ROS including Respiratory, CV, GI and , other than that noted in the HPI,  is negative    Vitals:  BP (!) 141/99   " Pulse 96   Temp 97.7  F (36.5  C)   Resp 20   Ht 1.626 m (5' 4\")   Wt 70.3 kg (155 lb)   SpO2 96%   BMI 26.61 kg/m    Exam:  GENERAL APPEARANCE:  Alert, in no distress  ENT:  Mouth and posterior oropharynx normal, moist mucous membranes  EYES:  EOM, conjunctivae, lids, pupils and irises normal  NECK:  no adenopathy  RESP:  lungs clear to auscultation , diminished breath sounds throughout  CV:  Palpation and auscultation of heart done , regular rate and rhythm, no murmur, rub, or gallop, trace edema  ABDOMEN:  bowel sounds normal  M/S:   ambulates with 4WW  SKIN:  dry skin  NEURO:   Cranial nerves 2-12 are normal tested and grossly at patient's baseline  PSYCH:  insight and judgement impaired, memory impaired     Lab/Diagnostic data: reviewed in Epic    ASSESSMENT/PLAN:  (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 NSR with recent EKG  Plan:   -Zio patch once returning from holiday MARGO  -continue Eliquis 5 mg po BID for now    (G20.B2) Parkinson's disease with dyskinesia and fluctuating manifestations  Comment: chronic   Plan:   -Sinemet QID  -follows with [place] but family would like a referral to neurology in [place]  -referral to [facility]       Total time spent with patient visit at the [facility] nursing facility was greater than 64 minutes including patient visit, review of past records, phone call to patient contact, and reviewed therapy plans, reviewed with nursing, review with cardiology for Zip patch    Electronically signed by:  NAREN Rothman CNP       Sincerely,        NAREN Rothman CNP      "

## 2023-12-22 PROBLEM — I48.0 PAROXYSMAL ATRIAL FIBRILLATION (H): Status: ACTIVE | Noted: 2023-01-01

## 2023-12-22 PROBLEM — M47.26 OSTEOARTHRITIS OF SPINE WITH RADICULOPATHY, LUMBAR REGION: Status: ACTIVE | Noted: 2019-12-13

## 2023-12-22 PROBLEM — E78.2 MIXED HYPERLIPIDEMIA: Status: ACTIVE | Noted: 2021-08-23

## 2023-12-22 PROBLEM — R20.0 RIGHT LEG NUMBNESS: Status: ACTIVE | Noted: 2023-01-01

## 2023-12-22 PROBLEM — M15.0 PRIMARY OSTEOARTHRITIS INVOLVING MULTIPLE JOINTS: Status: ACTIVE | Noted: 2023-01-01

## 2023-12-22 PROBLEM — G20.A1 PARKINSON'S DISEASE (H): Status: ACTIVE | Noted: 2018-09-27

## 2023-12-22 PROBLEM — R52 GENERALIZED PAIN: Status: ACTIVE | Noted: 2023-01-01

## 2023-12-22 PROBLEM — R53.1 WEAKNESS GENERALIZED: Status: ACTIVE | Noted: 2023-01-01

## 2023-12-22 PROBLEM — L71.9 ROSACEA: Status: ACTIVE | Noted: 2021-08-23

## 2024-01-01 ENCOUNTER — APPOINTMENT (OUTPATIENT)
Dept: CT IMAGING | Facility: CLINIC | Age: 84
DRG: 872 | End: 2024-01-01
Attending: INTERNAL MEDICINE
Payer: COMMERCIAL

## 2024-01-01 ENCOUNTER — ASSISTED LIVING VISIT (OUTPATIENT)
Dept: GERIATRICS | Facility: CLINIC | Age: 84
End: 2024-01-01
Payer: COMMERCIAL

## 2024-01-01 ENCOUNTER — TRANSITIONAL CARE UNIT VISIT (OUTPATIENT)
Dept: GERIATRICS | Facility: CLINIC | Age: 84
End: 2024-01-01
Payer: COMMERCIAL

## 2024-01-01 ENCOUNTER — VIRTUAL VISIT (OUTPATIENT)
Dept: NEUROLOGY | Facility: CLINIC | Age: 84
End: 2024-01-01
Attending: STUDENT IN AN ORGANIZED HEALTH CARE EDUCATION/TRAINING PROGRAM
Payer: COMMERCIAL

## 2024-01-01 ENCOUNTER — DOCUMENTATION ONLY (OUTPATIENT)
Dept: OTHER | Facility: CLINIC | Age: 84
End: 2024-01-01

## 2024-01-01 ENCOUNTER — APPOINTMENT (OUTPATIENT)
Dept: GENERAL RADIOLOGY | Facility: CLINIC | Age: 84
DRG: 872 | End: 2024-01-01
Payer: COMMERCIAL

## 2024-01-01 ENCOUNTER — DOCUMENTATION ONLY (OUTPATIENT)
Dept: GERIATRICS | Facility: CLINIC | Age: 84
End: 2024-01-01
Payer: COMMERCIAL

## 2024-01-01 ENCOUNTER — APPOINTMENT (OUTPATIENT)
Dept: PHYSICAL THERAPY | Facility: CLINIC | Age: 84
DRG: 872 | End: 2024-01-01
Attending: INTERNAL MEDICINE
Payer: COMMERCIAL

## 2024-01-01 ENCOUNTER — ORDERS ONLY (AUTO-RELEASED) (OUTPATIENT)
Dept: GERIATRICS | Facility: CLINIC | Age: 84
End: 2024-01-01
Payer: COMMERCIAL

## 2024-01-01 ENCOUNTER — LAB REQUISITION (OUTPATIENT)
Dept: LAB | Facility: CLINIC | Age: 84
End: 2024-01-01

## 2024-01-01 ENCOUNTER — TELEPHONE (OUTPATIENT)
Dept: GERIATRICS | Facility: CLINIC | Age: 84
End: 2024-01-01

## 2024-01-01 ENCOUNTER — TELEPHONE (OUTPATIENT)
Dept: NEUROLOGY | Facility: CLINIC | Age: 84
End: 2024-01-01
Payer: COMMERCIAL

## 2024-01-01 ENCOUNTER — HOSPITAL ENCOUNTER (EMERGENCY)
Facility: CLINIC | Age: 84
Discharge: HOME | End: 2024-06-15
Attending: EMERGENCY MEDICINE | Admitting: EMERGENCY MEDICINE
Payer: COMMERCIAL

## 2024-01-01 ENCOUNTER — HEALTH MAINTENANCE LETTER (OUTPATIENT)
Age: 84
End: 2024-01-01

## 2024-01-01 ENCOUNTER — OFFICE VISIT (OUTPATIENT)
Dept: NEUROLOGY | Facility: CLINIC | Age: 84
End: 2024-01-01
Payer: COMMERCIAL

## 2024-01-01 ENCOUNTER — DOCUMENTATION ONLY (OUTPATIENT)
Dept: GERIATRICS | Facility: CLINIC | Age: 84
End: 2024-01-01

## 2024-01-01 ENCOUNTER — APPOINTMENT (OUTPATIENT)
Dept: PHYSICAL THERAPY | Facility: CLINIC | Age: 84
DRG: 872 | End: 2024-01-01
Payer: COMMERCIAL

## 2024-01-01 ENCOUNTER — APPOINTMENT (OUTPATIENT)
Dept: GENERAL RADIOLOGY | Facility: CLINIC | Age: 84
DRG: 872 | End: 2024-01-01
Attending: EMERGENCY MEDICINE
Payer: COMMERCIAL

## 2024-01-01 ENCOUNTER — HOSPITAL ENCOUNTER (INPATIENT)
Facility: CLINIC | Age: 84
LOS: 3 days | Discharge: NURSING, REHAB OR LONG-TERM CARE | DRG: 872 | End: 2024-06-29
Attending: EMERGENCY MEDICINE | Admitting: INTERNAL MEDICINE
Payer: COMMERCIAL

## 2024-01-01 ENCOUNTER — APPOINTMENT (OUTPATIENT)
Dept: GENERAL RADIOLOGY | Facility: CLINIC | Age: 84
End: 2024-01-01
Attending: EMERGENCY MEDICINE
Payer: COMMERCIAL

## 2024-01-01 ENCOUNTER — TELEPHONE (OUTPATIENT)
Dept: NEUROLOGY | Facility: CLINIC | Age: 84
End: 2024-01-01

## 2024-01-01 ENCOUNTER — APPOINTMENT (OUTPATIENT)
Dept: CARDIOLOGY | Facility: CLINIC | Age: 84
DRG: 872 | End: 2024-01-01
Attending: INTERNAL MEDICINE
Payer: COMMERCIAL

## 2024-01-01 ENCOUNTER — OFFICE VISIT (OUTPATIENT)
Dept: NEUROLOGY | Facility: CLINIC | Age: 84
End: 2024-01-01
Attending: NURSE PRACTITIONER
Payer: COMMERCIAL

## 2024-01-01 ENCOUNTER — LAB REQUISITION (OUTPATIENT)
Dept: LAB | Facility: CLINIC | Age: 84
End: 2024-01-01
Payer: COMMERCIAL

## 2024-01-01 ENCOUNTER — DISCHARGE SUMMARY NURSING HOME (OUTPATIENT)
Dept: GERIATRICS | Facility: CLINIC | Age: 84
End: 2024-01-01
Payer: COMMERCIAL

## 2024-01-01 ENCOUNTER — HOSPITAL ENCOUNTER (OUTPATIENT)
Facility: CLINIC | Age: 84
Discharge: HOME | End: 2024-05-02
Admitting: NURSE PRACTITIONER
Payer: COMMERCIAL

## 2024-01-01 ENCOUNTER — HOSPITAL ENCOUNTER (EMERGENCY)
Facility: CLINIC | Age: 84
Discharge: HOME | End: 2024-08-28
Attending: EMERGENCY MEDICINE | Admitting: EMERGENCY MEDICINE
Payer: COMMERCIAL

## 2024-01-01 ENCOUNTER — PATIENT OUTREACH (OUTPATIENT)
Dept: CARE COORDINATION | Facility: CLINIC | Age: 84
End: 2024-01-01

## 2024-01-01 VITALS
SYSTOLIC BLOOD PRESSURE: 120 MMHG | RESPIRATION RATE: 16 BRPM | DIASTOLIC BLOOD PRESSURE: 82 MMHG | HEART RATE: 96 BPM | OXYGEN SATURATION: 95 % | BODY MASS INDEX: 26.46 KG/M2 | HEIGHT: 64 IN | WEIGHT: 155 LBS

## 2024-01-01 VITALS
OXYGEN SATURATION: 98 % | RESPIRATION RATE: 16 BRPM | SYSTOLIC BLOOD PRESSURE: 124 MMHG | HEIGHT: 65 IN | BODY MASS INDEX: 28.09 KG/M2 | TEMPERATURE: 97.2 F | DIASTOLIC BLOOD PRESSURE: 77 MMHG | HEART RATE: 86 BPM | WEIGHT: 168.6 LBS

## 2024-01-01 VITALS
RESPIRATION RATE: 16 BRPM | DIASTOLIC BLOOD PRESSURE: 70 MMHG | WEIGHT: 173.6 LBS | SYSTOLIC BLOOD PRESSURE: 120 MMHG | OXYGEN SATURATION: 97 % | BODY MASS INDEX: 28.92 KG/M2 | HEART RATE: 66 BPM | HEIGHT: 65 IN

## 2024-01-01 VITALS
TEMPERATURE: 97.8 F | HEART RATE: 80 BPM | DIASTOLIC BLOOD PRESSURE: 76 MMHG | HEIGHT: 61 IN | SYSTOLIC BLOOD PRESSURE: 140 MMHG | OXYGEN SATURATION: 98 % | RESPIRATION RATE: 16 BRPM | WEIGHT: 166 LBS | BODY MASS INDEX: 31.34 KG/M2

## 2024-01-01 VITALS
WEIGHT: 155 LBS | BODY MASS INDEX: 26.46 KG/M2 | DIASTOLIC BLOOD PRESSURE: 92 MMHG | SYSTOLIC BLOOD PRESSURE: 150 MMHG | HEIGHT: 64 IN | HEART RATE: 99 BPM | OXYGEN SATURATION: 96 %

## 2024-01-01 VITALS
HEART RATE: 84 BPM | TEMPERATURE: 97.5 F | HEIGHT: 65 IN | RESPIRATION RATE: 18 BRPM | SYSTOLIC BLOOD PRESSURE: 124 MMHG | DIASTOLIC BLOOD PRESSURE: 62 MMHG | WEIGHT: 173.6 LBS | BODY MASS INDEX: 28.92 KG/M2 | OXYGEN SATURATION: 93 %

## 2024-01-01 VITALS
WEIGHT: 166 LBS | OXYGEN SATURATION: 96 % | TEMPERATURE: 97.5 F | RESPIRATION RATE: 16 BRPM | SYSTOLIC BLOOD PRESSURE: 166 MMHG | DIASTOLIC BLOOD PRESSURE: 84 MMHG | HEART RATE: 86 BPM | HEIGHT: 61 IN | BODY MASS INDEX: 31.34 KG/M2

## 2024-01-01 VITALS
WEIGHT: 163 LBS | DIASTOLIC BLOOD PRESSURE: 83 MMHG | HEART RATE: 105 BPM | BODY MASS INDEX: 27.83 KG/M2 | TEMPERATURE: 97.3 F | SYSTOLIC BLOOD PRESSURE: 140 MMHG | HEIGHT: 64 IN | RESPIRATION RATE: 18 BRPM | OXYGEN SATURATION: 97 %

## 2024-01-01 VITALS
TEMPERATURE: 97.7 F | HEART RATE: 83 BPM | HEIGHT: 64 IN | RESPIRATION RATE: 20 BRPM | SYSTOLIC BLOOD PRESSURE: 112 MMHG | BODY MASS INDEX: 27.83 KG/M2 | WEIGHT: 163 LBS | OXYGEN SATURATION: 97 % | DIASTOLIC BLOOD PRESSURE: 66 MMHG

## 2024-01-01 VITALS
SYSTOLIC BLOOD PRESSURE: 99 MMHG | HEIGHT: 61 IN | BODY MASS INDEX: 31.3 KG/M2 | OXYGEN SATURATION: 96 % | DIASTOLIC BLOOD PRESSURE: 61 MMHG | TEMPERATURE: 97.3 F | WEIGHT: 165.8 LBS | RESPIRATION RATE: 18 BRPM | HEART RATE: 65 BPM

## 2024-01-01 VITALS
RESPIRATION RATE: 16 BRPM | HEART RATE: 88 BPM | WEIGHT: 173.6 LBS | DIASTOLIC BLOOD PRESSURE: 80 MMHG | OXYGEN SATURATION: 97 % | TEMPERATURE: 96.9 F | BODY MASS INDEX: 29.64 KG/M2 | SYSTOLIC BLOOD PRESSURE: 140 MMHG | HEIGHT: 64 IN

## 2024-01-01 VITALS
OXYGEN SATURATION: 95 % | RESPIRATION RATE: 18 BRPM | HEART RATE: 79 BPM | TEMPERATURE: 99 F | SYSTOLIC BLOOD PRESSURE: 151 MMHG | DIASTOLIC BLOOD PRESSURE: 88 MMHG

## 2024-01-01 VITALS
HEIGHT: 65 IN | DIASTOLIC BLOOD PRESSURE: 76 MMHG | SYSTOLIC BLOOD PRESSURE: 120 MMHG | OXYGEN SATURATION: 96 % | BODY MASS INDEX: 28.89 KG/M2 | HEART RATE: 66 BPM

## 2024-01-01 VITALS
DIASTOLIC BLOOD PRESSURE: 79 MMHG | WEIGHT: 172 LBS | HEART RATE: 96 BPM | SYSTOLIC BLOOD PRESSURE: 151 MMHG | OXYGEN SATURATION: 95 % | BODY MASS INDEX: 29.52 KG/M2

## 2024-01-01 VITALS
HEART RATE: 95 BPM | SYSTOLIC BLOOD PRESSURE: 143 MMHG | RESPIRATION RATE: 16 BRPM | HEIGHT: 64 IN | DIASTOLIC BLOOD PRESSURE: 86 MMHG | WEIGHT: 173.6 LBS | OXYGEN SATURATION: 96 % | BODY MASS INDEX: 29.64 KG/M2

## 2024-01-01 VITALS
SYSTOLIC BLOOD PRESSURE: 156 MMHG | TEMPERATURE: 97.9 F | HEIGHT: 65 IN | WEIGHT: 173.06 LBS | RESPIRATION RATE: 16 BRPM | DIASTOLIC BLOOD PRESSURE: 82 MMHG | HEART RATE: 99 BPM | OXYGEN SATURATION: 99 % | BODY MASS INDEX: 28.83 KG/M2

## 2024-01-01 VITALS
TEMPERATURE: 98 F | RESPIRATION RATE: 18 BRPM | OXYGEN SATURATION: 93 % | HEART RATE: 92 BPM | DIASTOLIC BLOOD PRESSURE: 63 MMHG | HEIGHT: 61 IN | WEIGHT: 168.6 LBS | BODY MASS INDEX: 31.83 KG/M2 | SYSTOLIC BLOOD PRESSURE: 131 MMHG

## 2024-01-01 VITALS — HEART RATE: 99 BPM | SYSTOLIC BLOOD PRESSURE: 150 MMHG | DIASTOLIC BLOOD PRESSURE: 92 MMHG | OXYGEN SATURATION: 96 %

## 2024-01-01 VITALS
TEMPERATURE: 97.8 F | DIASTOLIC BLOOD PRESSURE: 86 MMHG | SYSTOLIC BLOOD PRESSURE: 147 MMHG | WEIGHT: 168 LBS | HEIGHT: 61 IN | RESPIRATION RATE: 18 BRPM | OXYGEN SATURATION: 95 % | BODY MASS INDEX: 31.72 KG/M2 | HEART RATE: 60 BPM

## 2024-01-01 VITALS
HEART RATE: 109 BPM | DIASTOLIC BLOOD PRESSURE: 101 MMHG | RESPIRATION RATE: 16 BRPM | OXYGEN SATURATION: 97 % | TEMPERATURE: 97.5 F | SYSTOLIC BLOOD PRESSURE: 188 MMHG | WEIGHT: 150 LBS | HEIGHT: 65 IN | BODY MASS INDEX: 24.99 KG/M2

## 2024-01-01 DIAGNOSIS — K59.01 SLOW TRANSIT CONSTIPATION: ICD-10-CM

## 2024-01-01 DIAGNOSIS — I10 HYPERTENSION, UNSPECIFIED TYPE: ICD-10-CM

## 2024-01-01 DIAGNOSIS — M15.0 PRIMARY OSTEOARTHRITIS INVOLVING MULTIPLE JOINTS: ICD-10-CM

## 2024-01-01 DIAGNOSIS — A41.9 SEPSIS, DUE TO UNSPECIFIED ORGANISM, UNSPECIFIED WHETHER ACUTE ORGAN DYSFUNCTION PRESENT (H): Primary | ICD-10-CM

## 2024-01-01 DIAGNOSIS — F02.80 DEMENTIA DUE TO PARKINSON'S DISEASE, WITHOUT BEHAVIORAL DISTURBANCE, PSYCHOTIC DISTURBANCE, MOOD DISTURBANCE, OR ANXIETY, UNSPECIFIED DEMENTIA SEVERITY (H): ICD-10-CM

## 2024-01-01 DIAGNOSIS — R52 PAIN: Primary | ICD-10-CM

## 2024-01-01 DIAGNOSIS — G20.A2 PARKINSON'S DISEASE WITHOUT DYSKINESIA, WITH FLUCTUATING MANIFESTATIONS (H): ICD-10-CM

## 2024-01-01 DIAGNOSIS — E07.81 SICK EUTHYROIDISM: ICD-10-CM

## 2024-01-01 DIAGNOSIS — F03.90 SENILE DEMENTIA WITHOUT BEHAVIORAL DISTURBANCE (H): ICD-10-CM

## 2024-01-01 DIAGNOSIS — G20.B2 PARKINSON'S DISEASE WITH DYSKINESIA AND FLUCTUATING MANIFESTATIONS (H): ICD-10-CM

## 2024-01-01 DIAGNOSIS — I48.0 PAROXYSMAL ATRIAL FIBRILLATION (H): ICD-10-CM

## 2024-01-01 DIAGNOSIS — M62.81 GENERALIZED MUSCLE WEAKNESS: ICD-10-CM

## 2024-01-01 DIAGNOSIS — R78.81 BACTEREMIA: Primary | ICD-10-CM

## 2024-01-01 DIAGNOSIS — I10 ESSENTIAL (PRIMARY) HYPERTENSION: ICD-10-CM

## 2024-01-01 DIAGNOSIS — R07.9 CHEST PAIN, UNSPECIFIED TYPE: ICD-10-CM

## 2024-01-01 DIAGNOSIS — Z09 HOSPITAL DISCHARGE FOLLOW-UP: ICD-10-CM

## 2024-01-01 DIAGNOSIS — I67.9 CEREBRAL VASCULAR DISEASE: ICD-10-CM

## 2024-01-01 DIAGNOSIS — R06.02 SOB (SHORTNESS OF BREATH): ICD-10-CM

## 2024-01-01 DIAGNOSIS — G20.A1 PARKINSON'S DISEASE (H): ICD-10-CM

## 2024-01-01 DIAGNOSIS — G20.A1 DEMENTIA DUE TO PARKINSON'S DISEASE, WITHOUT BEHAVIORAL DISTURBANCE, PSYCHOTIC DISTURBANCE, MOOD DISTURBANCE, OR ANXIETY, UNSPECIFIED DEMENTIA SEVERITY (H): ICD-10-CM

## 2024-01-01 DIAGNOSIS — R26.81 GAIT INSTABILITY: ICD-10-CM

## 2024-01-01 DIAGNOSIS — G20.B2 PARKINSON'S DISEASE WITH DYSKINESIA AND FLUCTUATING MANIFESTATIONS (H): Primary | ICD-10-CM

## 2024-01-01 DIAGNOSIS — F03.90 DEMENTIA, UNSPECIFIED DEMENTIA SEVERITY, UNSPECIFIED DEMENTIA TYPE, UNSPECIFIED WHETHER BEHAVIORAL, PSYCHOTIC, OR MOOD DISTURBANCE OR ANXIETY (H): ICD-10-CM

## 2024-01-01 DIAGNOSIS — I48.0 PAROXYSMAL ATRIAL FIBRILLATION (H): Primary | ICD-10-CM

## 2024-01-01 DIAGNOSIS — R78.81 BACTEREMIA: ICD-10-CM

## 2024-01-01 DIAGNOSIS — R60.0 BILATERAL LOWER EXTREMITY EDEMA: ICD-10-CM

## 2024-01-01 DIAGNOSIS — I10 ESSENTIAL HYPERTENSION: ICD-10-CM

## 2024-01-01 DIAGNOSIS — G20.A1 PARKINSON'S DISEASE, UNSPECIFIED WHETHER DYSKINESIA PRESENT, UNSPECIFIED WHETHER MANIFESTATIONS FLUCTUATE (H): ICD-10-CM

## 2024-01-01 DIAGNOSIS — E87.8 OTHER DISORDERS OF ELECTROLYTE AND FLUID BALANCE, NOT ELSEWHERE CLASSIFIED: ICD-10-CM

## 2024-01-01 DIAGNOSIS — E03.9 HYPOTHYROIDISM, UNSPECIFIED: ICD-10-CM

## 2024-01-01 DIAGNOSIS — K64.4 EXTERNAL HEMORRHOIDS: ICD-10-CM

## 2024-01-01 DIAGNOSIS — G20.A2 PARKINSON'S DISEASE WITHOUT DYSKINESIA, WITH FLUCTUATING MANIFESTATIONS (H): Primary | ICD-10-CM

## 2024-01-01 DIAGNOSIS — H61.23 BILATERAL IMPACTED CERUMEN: Primary | ICD-10-CM

## 2024-01-01 DIAGNOSIS — E78.5 DYSLIPIDEMIA: ICD-10-CM

## 2024-01-01 DIAGNOSIS — R62.7 ADULT FAILURE TO THRIVE: ICD-10-CM

## 2024-01-01 DIAGNOSIS — Z53.9 DIAGNOSIS NOT YET DEFINED: Primary | ICD-10-CM

## 2024-01-01 DIAGNOSIS — G20.A1 PARKINSON'S DISEASE (H): Primary | ICD-10-CM

## 2024-01-01 DIAGNOSIS — F03.90 SENILE DEMENTIA WITHOUT BEHAVIORAL DISTURBANCE (H): Primary | ICD-10-CM

## 2024-01-01 DIAGNOSIS — L71.9 ROSACEA: ICD-10-CM

## 2024-01-01 DIAGNOSIS — K59.01 SLOW TRANSIT CONSTIPATION: Primary | ICD-10-CM

## 2024-01-01 DIAGNOSIS — I47.10 PAROXYSMAL SUPRAVENTRICULAR TACHYCARDIA (H): ICD-10-CM

## 2024-01-01 DIAGNOSIS — Z11.1 ENCOUNTER FOR SCREENING FOR RESPIRATORY TUBERCULOSIS: ICD-10-CM

## 2024-01-01 DIAGNOSIS — R41.82 ALTERED MENTAL STATUS, UNSPECIFIED ALTERED MENTAL STATUS TYPE: ICD-10-CM

## 2024-01-01 DIAGNOSIS — M47.26 OSTEOARTHRITIS OF SPINE WITH RADICULOPATHY, LUMBAR REGION: ICD-10-CM

## 2024-01-01 DIAGNOSIS — R53.81 PHYSICAL DECONDITIONING: ICD-10-CM

## 2024-01-01 DIAGNOSIS — E86.9 VOLUME DEPLETION: ICD-10-CM

## 2024-01-01 DIAGNOSIS — F02.B0 MODERATE DEMENTIA ASSOCIATED WITH OTHER UNDERLYING DISEASE, WITHOUT BEHAVIORAL DISTURBANCE, PSYCHOTIC DISTURBANCE, MOOD DISTURBANCE, OR ANXIETY (H): ICD-10-CM

## 2024-01-01 DIAGNOSIS — Z71.89 ADVANCED DIRECTIVES, COUNSELING/DISCUSSION: ICD-10-CM

## 2024-01-01 DIAGNOSIS — R73.03 PREDIABETES: ICD-10-CM

## 2024-01-01 DIAGNOSIS — R21 RASH: Primary | ICD-10-CM

## 2024-01-01 DIAGNOSIS — E86.0 DEHYDRATION: ICD-10-CM

## 2024-01-01 DIAGNOSIS — R09.89 PULMONARY VASCULAR CONGESTION: Primary | ICD-10-CM

## 2024-01-01 DIAGNOSIS — R94.6 ABNORMAL FINDING ON THYROID FUNCTION TEST: ICD-10-CM

## 2024-01-01 DIAGNOSIS — R52 PAIN: ICD-10-CM

## 2024-01-01 DIAGNOSIS — R21 RASH: ICD-10-CM

## 2024-01-01 DIAGNOSIS — R00.0 TACHYCARDIA: ICD-10-CM

## 2024-01-01 DIAGNOSIS — R41.0 CONFUSION: ICD-10-CM

## 2024-01-01 LAB
ACINETOBACTER SPECIES: NOT DETECTED
ALBUMIN SERPL BCG-MCNC: 3.9 G/DL (ref 3.5–5.2)
ALBUMIN SERPL BCG-MCNC: 4 G/DL (ref 3.5–5.2)
ALBUMIN SERPL BCG-MCNC: 4.2 G/DL (ref 3.5–5.2)
ALBUMIN UR-MCNC: 10 MG/DL
ALBUMIN UR-MCNC: NEGATIVE MG/DL
ALP SERPL-CCNC: 63 U/L (ref 40–150)
ALP SERPL-CCNC: 71 U/L (ref 40–150)
ALP SERPL-CCNC: 72 U/L (ref 40–150)
ALT SERPL W P-5'-P-CCNC: 10 U/L (ref 0–50)
ALT SERPL W P-5'-P-CCNC: 6 U/L (ref 0–50)
ALT SERPL W P-5'-P-CCNC: <5 U/L (ref 0–50)
AMPHETAMINES UR QL SCN: NORMAL
ANION GAP SERPL CALCULATED.3IONS-SCNC: 10 MMOL/L (ref 7–15)
ANION GAP SERPL CALCULATED.3IONS-SCNC: 11 MMOL/L (ref 7–15)
ANION GAP SERPL CALCULATED.3IONS-SCNC: 12 MMOL/L (ref 7–15)
ANION GAP SERPL CALCULATED.3IONS-SCNC: 13 MMOL/L (ref 7–15)
ANION GAP SERPL CALCULATED.3IONS-SCNC: 15 MMOL/L (ref 7–15)
ANION GAP SERPL CALCULATED.3IONS-SCNC: 18 MMOL/L (ref 7–15)
APPEARANCE UR: ABNORMAL
APPEARANCE UR: CLEAR
AST SERPL W P-5'-P-CCNC: 12 U/L (ref 0–45)
AST SERPL W P-5'-P-CCNC: 12 U/L (ref 0–45)
AST SERPL W P-5'-P-CCNC: 13 U/L (ref 0–45)
ATRIAL RATE - MUSE: 126 BPM
ATRIAL RATE - MUSE: 84 BPM
BACTERIA BLD CULT: ABNORMAL
BACTERIA BLD CULT: ABNORMAL
BACTERIA BLD CULT: NO GROWTH
BARBITURATES UR QL SCN: NORMAL
BASE EXCESS BLDV CALC-SCNC: 4 MMOL/L (ref -3–3)
BASOPHILS # BLD AUTO: 0 10E3/UL (ref 0–0.2)
BASOPHILS # BLD AUTO: 0.1 10E3/UL (ref 0–0.2)
BASOPHILS # BLD MANUAL: 0 10E3/UL (ref 0–0.2)
BASOPHILS NFR BLD AUTO: 0 %
BASOPHILS NFR BLD AUTO: 1 %
BASOPHILS NFR BLD AUTO: 1 %
BASOPHILS NFR BLD MANUAL: 0 %
BENZODIAZ UR QL SCN: NORMAL
BILIRUB DIRECT SERPL-MCNC: <0.2 MG/DL (ref 0–0.3)
BILIRUB SERPL-MCNC: 0.3 MG/DL
BILIRUB SERPL-MCNC: 0.3 MG/DL
BILIRUB SERPL-MCNC: 0.8 MG/DL
BILIRUB UR QL STRIP: NEGATIVE
BILIRUB UR QL STRIP: NEGATIVE
BUN SERPL-MCNC: 10.6 MG/DL (ref 8–23)
BUN SERPL-MCNC: 10.8 MG/DL (ref 8–23)
BUN SERPL-MCNC: 11.7 MG/DL (ref 8–23)
BUN SERPL-MCNC: 12.2 MG/DL (ref 8–23)
BUN SERPL-MCNC: 12.2 MG/DL (ref 8–23)
BUN SERPL-MCNC: 12.8 MG/DL (ref 8–23)
BUN SERPL-MCNC: 13.5 MG/DL (ref 8–23)
BUN SERPL-MCNC: 15.3 MG/DL (ref 8–23)
BUN SERPL-MCNC: 21.6 MG/DL (ref 8–23)
BUN SERPL-MCNC: 21.6 MG/DL (ref 8–23)
BZE UR QL SCN: NORMAL
CALCIUM SERPL-MCNC: 9.1 MG/DL (ref 8.8–10.2)
CALCIUM SERPL-MCNC: 9.1 MG/DL (ref 8.8–10.2)
CALCIUM SERPL-MCNC: 9.3 MG/DL (ref 8.8–10.2)
CALCIUM SERPL-MCNC: 9.4 MG/DL (ref 8.8–10.2)
CALCIUM SERPL-MCNC: 9.4 MG/DL (ref 8.8–10.2)
CALCIUM SERPL-MCNC: 9.4 MG/DL (ref 8.8–10.4)
CALCIUM SERPL-MCNC: 9.6 MG/DL (ref 8.8–10.2)
CALCIUM SERPL-MCNC: 9.6 MG/DL (ref 8.8–10.4)
CALCIUM SERPL-MCNC: 9.7 MG/DL (ref 8.8–10.2)
CALCIUM SERPL-MCNC: 9.8 MG/DL (ref 8.8–10.4)
CANNABINOIDS UR QL SCN: NORMAL
CHLORIDE SERPL-SCNC: 101 MMOL/L (ref 98–107)
CHLORIDE SERPL-SCNC: 102 MMOL/L (ref 98–107)
CHLORIDE SERPL-SCNC: 102 MMOL/L (ref 98–107)
CHLORIDE SERPL-SCNC: 103 MMOL/L (ref 98–107)
CHLORIDE SERPL-SCNC: 103 MMOL/L (ref 98–107)
CHLORIDE SERPL-SCNC: 104 MMOL/L (ref 98–107)
CITROBACTER SPECIES: NOT DETECTED
CK SERPL-CCNC: 32 U/L (ref 26–192)
COLOR UR AUTO: YELLOW
COLOR UR AUTO: YELLOW
CREAT SERPL-MCNC: 0.43 MG/DL (ref 0.51–0.95)
CREAT SERPL-MCNC: 0.43 MG/DL (ref 0.51–0.95)
CREAT SERPL-MCNC: 0.45 MG/DL (ref 0.51–0.95)
CREAT SERPL-MCNC: 0.51 MG/DL (ref 0.51–0.95)
CREAT SERPL-MCNC: 0.52 MG/DL (ref 0.51–0.95)
CREAT SERPL-MCNC: 0.56 MG/DL (ref 0.51–0.95)
CREAT SERPL-MCNC: 0.57 MG/DL (ref 0.51–0.95)
CREAT SERPL-MCNC: 0.57 MG/DL (ref 0.51–0.95)
CREAT SERPL-MCNC: 0.58 MG/DL (ref 0.51–0.95)
CREAT SERPL-MCNC: 0.63 MG/DL (ref 0.51–0.95)
CTX-M: NORMAL
DEPRECATED HCO3 PLAS-SCNC: 21 MMOL/L (ref 22–29)
DEPRECATED HCO3 PLAS-SCNC: 24 MMOL/L (ref 22–29)
DEPRECATED HCO3 PLAS-SCNC: 24 MMOL/L (ref 22–29)
DEPRECATED HCO3 PLAS-SCNC: 25 MMOL/L (ref 22–29)
DIASTOLIC BLOOD PRESSURE - MUSE: NORMAL MMHG
DIASTOLIC BLOOD PRESSURE - MUSE: NORMAL MMHG
EGFRCR SERPLBLD CKD-EPI 2021: 86 ML/MIN/1.73M2
EGFRCR SERPLBLD CKD-EPI 2021: 88 ML/MIN/1.73M2
EGFRCR SERPLBLD CKD-EPI 2021: 89 ML/MIN/1.73M2
EGFRCR SERPLBLD CKD-EPI 2021: >90 ML/MIN/1.73M2
ENTEROBACTER SPECIES: NOT DETECTED
EOSINOPHIL # BLD AUTO: 0 10E3/UL (ref 0–0.7)
EOSINOPHIL # BLD AUTO: 0.1 10E3/UL (ref 0–0.7)
EOSINOPHIL # BLD AUTO: 0.1 10E3/UL (ref 0–0.7)
EOSINOPHIL # BLD MANUAL: 0 10E3/UL (ref 0–0.7)
EOSINOPHIL NFR BLD AUTO: 0 %
EOSINOPHIL NFR BLD AUTO: 0 %
EOSINOPHIL NFR BLD AUTO: 1 %
EOSINOPHIL NFR BLD AUTO: 1 %
EOSINOPHIL NFR BLD AUTO: 2 %
EOSINOPHIL NFR BLD MANUAL: 0 %
ERYTHROCYTE [DISTWIDTH] IN BLOOD BY AUTOMATED COUNT: 13.3 % (ref 10–15)
ERYTHROCYTE [DISTWIDTH] IN BLOOD BY AUTOMATED COUNT: 13.4 % (ref 10–15)
ERYTHROCYTE [DISTWIDTH] IN BLOOD BY AUTOMATED COUNT: 13.4 % (ref 10–15)
ERYTHROCYTE [DISTWIDTH] IN BLOOD BY AUTOMATED COUNT: 13.5 % (ref 10–15)
ERYTHROCYTE [DISTWIDTH] IN BLOOD BY AUTOMATED COUNT: 13.5 % (ref 10–15)
ERYTHROCYTE [DISTWIDTH] IN BLOOD BY AUTOMATED COUNT: 13.6 % (ref 10–15)
ERYTHROCYTE [DISTWIDTH] IN BLOOD BY AUTOMATED COUNT: 13.8 % (ref 10–15)
ERYTHROCYTE [DISTWIDTH] IN BLOOD BY AUTOMATED COUNT: 13.9 % (ref 10–15)
ESCHERICHIA COLI: NOT DETECTED
FENTANYL UR QL: NORMAL
FLUAV RNA SPEC QL NAA+PROBE: NEGATIVE
FLUBV RNA RESP QL NAA+PROBE: NEGATIVE
GAMMA INTERFERON BACKGROUND BLD IA-ACNC: 0.04 IU/ML
GIANT PLATELETS BLD QL SMEAR: SLIGHT
GLUCOSE BLDC GLUCOMTR-MCNC: 109 MG/DL (ref 70–99)
GLUCOSE SERPL-MCNC: 109 MG/DL (ref 70–99)
GLUCOSE SERPL-MCNC: 109 MG/DL (ref 70–99)
GLUCOSE SERPL-MCNC: 115 MG/DL (ref 70–99)
GLUCOSE SERPL-MCNC: 115 MG/DL (ref 70–99)
GLUCOSE SERPL-MCNC: 122 MG/DL (ref 70–99)
GLUCOSE SERPL-MCNC: 123 MG/DL (ref 70–99)
GLUCOSE SERPL-MCNC: 123 MG/DL (ref 70–99)
GLUCOSE SERPL-MCNC: 124 MG/DL (ref 70–99)
GLUCOSE SERPL-MCNC: 178 MG/DL (ref 70–99)
GLUCOSE SERPL-MCNC: 90 MG/DL (ref 70–99)
GLUCOSE UR STRIP-MCNC: NEGATIVE MG/DL
GLUCOSE UR STRIP-MCNC: NEGATIVE MG/DL
HCO3 BLDV-SCNC: 30 MMOL/L (ref 21–28)
HCO3 SERPL-SCNC: 21 MMOL/L (ref 22–29)
HCO3 SERPL-SCNC: 24 MMOL/L (ref 22–29)
HCO3 SERPL-SCNC: 24 MMOL/L (ref 22–29)
HCT VFR BLD AUTO: 37.1 % (ref 35–47)
HCT VFR BLD AUTO: 38.6 % (ref 35–47)
HCT VFR BLD AUTO: 38.8 % (ref 35–47)
HCT VFR BLD AUTO: 39.8 % (ref 35–47)
HCT VFR BLD AUTO: 39.8 % (ref 35–47)
HCT VFR BLD AUTO: 40.1 % (ref 35–47)
HCT VFR BLD AUTO: 40.3 % (ref 35–47)
HCT VFR BLD AUTO: 41 % (ref 35–47)
HCT VFR BLD AUTO: 43 % (ref 35–47)
HCT VFR BLD AUTO: 43.2 % (ref 35–47)
HGB BLD-MCNC: 11.8 G/DL (ref 11.7–15.7)
HGB BLD-MCNC: 12.4 G/DL (ref 11.7–15.7)
HGB BLD-MCNC: 12.5 G/DL (ref 11.7–15.7)
HGB BLD-MCNC: 12.8 G/DL (ref 11.7–15.7)
HGB BLD-MCNC: 12.9 G/DL (ref 11.7–15.7)
HGB BLD-MCNC: 13.1 G/DL (ref 11.7–15.7)
HGB BLD-MCNC: 13.5 G/DL (ref 11.7–15.7)
HGB BLD-MCNC: 14 G/DL (ref 11.7–15.7)
HGB UR QL STRIP: ABNORMAL
HGB UR QL STRIP: NEGATIVE
HOLD SPECIMEN: NORMAL
HYALINE CASTS: 1 /LPF
IMM GRANULOCYTES # BLD: 0 10E3/UL
IMM GRANULOCYTES # BLD: 0.1 10E3/UL
IMM GRANULOCYTES # BLD: 0.1 10E3/UL
IMM GRANULOCYTES NFR BLD: 0 %
IMM GRANULOCYTES NFR BLD: 1 %
IMM GRANULOCYTES NFR BLD: 1 %
IMP: NORMAL
INR PPP: 1.32 (ref 0.85–1.15)
INTERPRETATION ECG - MUSE: NORMAL
INTERPRETATION ECG - MUSE: NORMAL
KETONES UR STRIP-MCNC: 10 MG/DL
KETONES UR STRIP-MCNC: NEGATIVE MG/DL
KLEBSIELLA OXYTOCA: NOT DETECTED
KLEBSIELLA PNEUMONIAE: NOT DETECTED
KPC: NORMAL
LACTATE SERPL-SCNC: 2.1 MMOL/L (ref 0.7–2)
LACTATE SERPL-SCNC: 2.1 MMOL/L (ref 0.7–2)
LACTATE SERPL-SCNC: 2.3 MMOL/L (ref 0.7–2)
LACTATE SERPL-SCNC: 2.3 MMOL/L (ref 0.7–2)
LACTATE SERPL-SCNC: 2.9 MMOL/L (ref 0.7–2)
LACTATE SERPL-SCNC: 3.1 MMOL/L (ref 0.7–2)
LACTATE SERPL-SCNC: 3.4 MMOL/L (ref 0.7–2)
LACTATE SERPL-SCNC: 3.5 MMOL/L (ref 0.7–2)
LACTATE SERPL-SCNC: 3.5 MMOL/L (ref 0.7–2)
LACTATE SERPL-SCNC: 5.3 MMOL/L (ref 0.7–2)
LEUKOCYTE ESTERASE UR QL STRIP: ABNORMAL
LEUKOCYTE ESTERASE UR QL STRIP: NEGATIVE
LVEF ECHO: NORMAL
LYMPHOCYTES # BLD AUTO: 1.1 10E3/UL (ref 0.8–5.3)
LYMPHOCYTES # BLD AUTO: 1.3 10E3/UL (ref 0.8–5.3)
LYMPHOCYTES # BLD AUTO: 1.4 10E3/UL (ref 0.8–5.3)
LYMPHOCYTES # BLD AUTO: 1.5 10E3/UL (ref 0.8–5.3)
LYMPHOCYTES # BLD AUTO: 1.7 10E3/UL (ref 0.8–5.3)
LYMPHOCYTES # BLD MANUAL: 1.7 10E3/UL (ref 0.8–5.3)
LYMPHOCYTES NFR BLD AUTO: 10 %
LYMPHOCYTES NFR BLD AUTO: 14 %
LYMPHOCYTES NFR BLD AUTO: 21 %
LYMPHOCYTES NFR BLD AUTO: 23 %
LYMPHOCYTES NFR BLD AUTO: 24 %
LYMPHOCYTES NFR BLD MANUAL: 12 %
M TB IFN-G BLD-IMP: NEGATIVE
M TB IFN-G CD4+ BCKGRND COR BLD-ACNC: 4.65 IU/ML
MCH RBC QN AUTO: 28.8 PG (ref 26.5–33)
MCH RBC QN AUTO: 28.8 PG (ref 26.5–33)
MCH RBC QN AUTO: 29.2 PG (ref 26.5–33)
MCH RBC QN AUTO: 29.2 PG (ref 26.5–33)
MCH RBC QN AUTO: 29.4 PG (ref 26.5–33)
MCH RBC QN AUTO: 29.4 PG (ref 26.5–33)
MCH RBC QN AUTO: 29.6 PG (ref 26.5–33)
MCH RBC QN AUTO: 29.8 PG (ref 26.5–33)
MCH RBC QN AUTO: 30.4 PG (ref 26.5–33)
MCH RBC QN AUTO: 30.4 PG (ref 26.5–33)
MCHC RBC AUTO-ENTMCNC: 31.3 G/DL (ref 31.5–36.5)
MCHC RBC AUTO-ENTMCNC: 31.4 G/DL (ref 31.5–36.5)
MCHC RBC AUTO-ENTMCNC: 31.8 G/DL (ref 31.5–36.5)
MCHC RBC AUTO-ENTMCNC: 31.8 G/DL (ref 31.5–36.5)
MCHC RBC AUTO-ENTMCNC: 31.9 G/DL (ref 31.5–36.5)
MCHC RBC AUTO-ENTMCNC: 32 G/DL (ref 31.5–36.5)
MCHC RBC AUTO-ENTMCNC: 32 G/DL (ref 31.5–36.5)
MCHC RBC AUTO-ENTMCNC: 32.2 G/DL (ref 31.5–36.5)
MCHC RBC AUTO-ENTMCNC: 32.6 G/DL (ref 31.5–36.5)
MCHC RBC AUTO-ENTMCNC: 33.4 G/DL (ref 31.5–36.5)
MCV RBC AUTO: 91 FL (ref 78–100)
MCV RBC AUTO: 92 FL (ref 78–100)
MCV RBC AUTO: 92 FL (ref 78–100)
MCV RBC AUTO: 93 FL (ref 78–100)
MCV RBC AUTO: 94 FL (ref 78–100)
MITOGEN IGNF BCKGRD COR BLD-ACNC: 0 IU/ML
MITOGEN IGNF BCKGRD COR BLD-ACNC: 0.01 IU/ML
MONOCYTES # BLD AUTO: 0.3 10E3/UL (ref 0–1.3)
MONOCYTES # BLD AUTO: 0.4 10E3/UL (ref 0–1.3)
MONOCYTES # BLD AUTO: 0.5 10E3/UL (ref 0–1.3)
MONOCYTES # BLD AUTO: 0.7 10E3/UL (ref 0–1.3)
MONOCYTES # BLD AUTO: 1 10E3/UL (ref 0–1.3)
MONOCYTES # BLD MANUAL: 0.7 10E3/UL (ref 0–1.3)
MONOCYTES NFR BLD AUTO: 5 %
MONOCYTES NFR BLD AUTO: 6 %
MONOCYTES NFR BLD AUTO: 7 %
MONOCYTES NFR BLD AUTO: 7 %
MONOCYTES NFR BLD AUTO: 8 %
MONOCYTES NFR BLD MANUAL: 5 %
MUCOUS THREADS #/AREA URNS LPF: PRESENT /LPF
NDM: NORMAL
NEUTROPHILS # BLD AUTO: 11.4 10E3/UL (ref 1.6–8.3)
NEUTROPHILS # BLD AUTO: 3.1 10E3/UL (ref 1.6–8.3)
NEUTROPHILS # BLD AUTO: 4.1 10E3/UL (ref 1.6–8.3)
NEUTROPHILS # BLD AUTO: 5.5 10E3/UL (ref 1.6–8.3)
NEUTROPHILS # BLD AUTO: 8.5 10E3/UL (ref 1.6–8.3)
NEUTROPHILS # BLD MANUAL: 11.5 10E3/UL (ref 1.6–8.3)
NEUTROPHILS NFR BLD AUTO: 67 %
NEUTROPHILS NFR BLD AUTO: 69 %
NEUTROPHILS NFR BLD AUTO: 70 %
NEUTROPHILS NFR BLD AUTO: 79 %
NEUTROPHILS NFR BLD AUTO: 82 %
NEUTROPHILS NFR BLD MANUAL: 83 %
NITRATE UR QL: NEGATIVE
NITRATE UR QL: NEGATIVE
NRBC # BLD AUTO: 0 10E3/UL
NRBC BLD AUTO-RTO: 0 /100
NT-PROBNP SERPL-MCNC: 2617 PG/ML (ref 0–1800)
NT-PROBNP SERPL-MCNC: 5207 PG/ML (ref 0–1800)
O2/TOTAL GAS SETTING VFR VENT: 21 %
OPIATES UR QL SCN: NORMAL
OXA (DETECTED/NOT DETECTED): NORMAL
OXYHGB MFR BLDV: 26 % (ref 70–75)
P AXIS - MUSE: 59 DEGREES
P AXIS - MUSE: 66 DEGREES
PCO2 BLDV: 48 MM HG (ref 40–50)
PCP QUAL URINE (ROCHE): NORMAL
PH BLDV: 7.4 [PH] (ref 7.32–7.43)
PH UR STRIP: 6 [PH] (ref 5–7)
PH UR STRIP: 6 [PH] (ref 5–7)
PLAT MORPH BLD: ABNORMAL
PLATELET # BLD AUTO: 193 10E3/UL (ref 150–450)
PLATELET # BLD AUTO: 210 10E3/UL (ref 150–450)
PLATELET # BLD AUTO: 213 10E3/UL (ref 150–450)
PLATELET # BLD AUTO: 215 10E3/UL (ref 150–450)
PLATELET # BLD AUTO: 217 10E3/UL (ref 150–450)
PLATELET # BLD AUTO: 231 10E3/UL (ref 150–450)
PLATELET # BLD AUTO: 242 10E3/UL (ref 150–450)
PLATELET # BLD AUTO: 246 10E3/UL (ref 150–450)
PLATELET # BLD AUTO: 247 10E3/UL (ref 150–450)
PLATELET # BLD AUTO: 250 10E3/UL (ref 150–450)
PO2 BLDV: 17 MM HG (ref 25–47)
POTASSIUM SERPL-SCNC: 3.7 MMOL/L (ref 3.4–5.3)
POTASSIUM SERPL-SCNC: 3.8 MMOL/L (ref 3.4–5.3)
POTASSIUM SERPL-SCNC: 3.8 MMOL/L (ref 3.4–5.3)
POTASSIUM SERPL-SCNC: 3.9 MMOL/L (ref 3.4–5.3)
POTASSIUM SERPL-SCNC: 3.9 MMOL/L (ref 3.4–5.3)
POTASSIUM SERPL-SCNC: 4 MMOL/L (ref 3.4–5.3)
POTASSIUM SERPL-SCNC: 4.1 MMOL/L (ref 3.4–5.3)
POTASSIUM SERPL-SCNC: 4.1 MMOL/L (ref 3.4–5.3)
PR INTERVAL - MUSE: 158 MS
PR INTERVAL - MUSE: 176 MS
PROCALCITONIN SERPL IA-MCNC: 0.06 NG/ML
PROCALCITONIN SERPL IA-MCNC: 0.09 NG/ML
PROT SERPL-MCNC: 6.4 G/DL (ref 6.4–8.3)
PROT SERPL-MCNC: 6.5 G/DL (ref 6.4–8.3)
PROT SERPL-MCNC: 6.8 G/DL (ref 6.4–8.3)
PROTEUS SPECIES: NOT DETECTED
PSEUDOMONAS AERUGINOSA: NOT DETECTED
QRS DURATION - MUSE: 66 MS
QRS DURATION - MUSE: 80 MS
QT - MUSE: 316 MS
QT - MUSE: 380 MS
QTC - MUSE: 449 MS
QTC - MUSE: 457 MS
QUANTIFERON MITOGEN: 4.69 IU/ML
QUANTIFERON NIL TUBE: 0.04 IU/ML
QUANTIFERON TB1 TUBE: 0.04 IU/ML
QUANTIFERON TB2 TUBE: 0.05
R AXIS - MUSE: -29 DEGREES
R AXIS - MUSE: 2 DEGREES
RBC # BLD AUTO: 4.01 10E6/UL (ref 3.8–5.2)
RBC # BLD AUTO: 4.24 10E6/UL (ref 3.8–5.2)
RBC # BLD AUTO: 4.25 10E6/UL (ref 3.8–5.2)
RBC # BLD AUTO: 4.3 10E6/UL (ref 3.8–5.2)
RBC # BLD AUTO: 4.34 10E6/UL (ref 3.8–5.2)
RBC # BLD AUTO: 4.35 10E6/UL (ref 3.8–5.2)
RBC # BLD AUTO: 4.42 10E6/UL (ref 3.8–5.2)
RBC # BLD AUTO: 4.45 10E6/UL (ref 3.8–5.2)
RBC # BLD AUTO: 4.61 10E6/UL (ref 3.8–5.2)
RBC # BLD AUTO: 4.62 10E6/UL (ref 3.8–5.2)
RBC MORPH BLD: ABNORMAL
RBC URINE: 1 /HPF
RBC URINE: <1 /HPF
RSV RNA SPEC NAA+PROBE: NEGATIVE
SAO2 % BLDV: 26.1 % (ref 70–75)
SARS-COV-2 RNA RESP QL NAA+PROBE: NEGATIVE
SARS-COV-2 RNA RESP QL NAA+PROBE: NEGATIVE
SODIUM SERPL-SCNC: 136 MMOL/L (ref 135–145)
SODIUM SERPL-SCNC: 138 MMOL/L (ref 135–145)
SODIUM SERPL-SCNC: 139 MMOL/L (ref 135–145)
SODIUM SERPL-SCNC: 140 MMOL/L (ref 135–145)
SODIUM SERPL-SCNC: 141 MMOL/L (ref 135–145)
SP GR UR STRIP: 1.01 (ref 1–1.03)
SP GR UR STRIP: 1.02 (ref 1–1.03)
SQUAMOUS EPITHELIAL: 1 /HPF
SQUAMOUS EPITHELIAL: <1 /HPF
SYSTOLIC BLOOD PRESSURE - MUSE: NORMAL MMHG
SYSTOLIC BLOOD PRESSURE - MUSE: NORMAL MMHG
T AXIS - MUSE: 27 DEGREES
T AXIS - MUSE: 74 DEGREES
T3FREE SERPL-MCNC: 2.7 PG/ML (ref 2–4.4)
T4 FREE SERPL-MCNC: 0.93 NG/DL (ref 0.9–1.7)
T4 FREE SERPL-MCNC: 0.97 NG/DL (ref 0.9–1.7)
T4 FREE SERPL-MCNC: 1.01 NG/DL (ref 0.9–1.7)
TROPONIN T SERPL HS-MCNC: 31 NG/L
TROPONIN T SERPL HS-MCNC: 33 NG/L
TROPONIN T SERPL HS-MCNC: 37 NG/L
TROPONIN T SERPL HS-MCNC: 38 NG/L
TSH SERPL DL<=0.005 MIU/L-ACNC: 2.09 UIU/ML (ref 0.3–4.2)
TSH SERPL DL<=0.005 MIU/L-ACNC: 3.29 UIU/ML (ref 0.3–4.2)
TSH SERPL DL<=0.005 MIU/L-ACNC: 8.11 UIU/ML (ref 0.3–4.2)
UROBILINOGEN UR STRIP-MCNC: NORMAL MG/DL
UROBILINOGEN UR STRIP-MCNC: NORMAL MG/DL
VENTRICULAR RATE- MUSE: 126 BPM
VENTRICULAR RATE- MUSE: 84 BPM
VIM: NORMAL
WBC # BLD AUTO: 10.8 10E3/UL (ref 4–11)
WBC # BLD AUTO: 12.1 10E3/UL (ref 4–11)
WBC # BLD AUTO: 13.9 10E3/UL (ref 4–11)
WBC # BLD AUTO: 4.6 10E3/UL (ref 4–11)
WBC # BLD AUTO: 5.8 10E3/UL (ref 4–11)
WBC # BLD AUTO: 6.8 10E3/UL (ref 4–11)
WBC # BLD AUTO: 7.8 10E3/UL (ref 4–11)
WBC # BLD AUTO: 9.1 10E3/UL (ref 4–11)
WBC # BLD AUTO: 9.1 10E3/UL (ref 4–11)
WBC # BLD AUTO: 9.3 10E3/UL (ref 4–11)
WBC URINE: 1 /HPF
WBC URINE: 2 /HPF

## 2024-01-01 PROCEDURE — 999N000157 HC STATISTIC RCP TIME EA 10 MIN

## 2024-01-01 PROCEDURE — 85007 BL SMEAR W/DIFF WBC COUNT: CPT | Performed by: INTERNAL MEDICINE

## 2024-01-01 PROCEDURE — P9604 ONE-WAY ALLOW PRORATED TRIP: HCPCS | Mod: ORL | Performed by: NURSE PRACTITIONER

## 2024-01-01 PROCEDURE — 36415 COLL VENOUS BLD VENIPUNCTURE: CPT | Performed by: HOSPITALIST

## 2024-01-01 PROCEDURE — 85027 COMPLETE CBC AUTOMATED: CPT | Performed by: HOSPITALIST

## 2024-01-01 PROCEDURE — 99316 NF DSCHRG MGMT 30 MIN+: CPT | Performed by: NURSE PRACTITIONER

## 2024-01-01 PROCEDURE — 96360 HYDRATION IV INFUSION INIT: CPT

## 2024-01-01 PROCEDURE — 80048 BASIC METABOLIC PNL TOTAL CA: CPT | Mod: ORL | Performed by: NURSE PRACTITIONER

## 2024-01-01 PROCEDURE — 97530 THERAPEUTIC ACTIVITIES: CPT | Mod: GP

## 2024-01-01 PROCEDURE — 82565 ASSAY OF CREATININE: CPT | Performed by: EMERGENCY MEDICINE

## 2024-01-01 PROCEDURE — 84439 ASSAY OF FREE THYROXINE: CPT | Mod: ORL | Performed by: NURSE PRACTITIONER

## 2024-01-01 PROCEDURE — 999N000111 HC STATISTIC OT IP EVAL DEFER

## 2024-01-01 PROCEDURE — 36415 COLL VENOUS BLD VENIPUNCTURE: CPT | Performed by: INTERNAL MEDICINE

## 2024-01-01 PROCEDURE — 85025 COMPLETE CBC W/AUTO DIFF WBC: CPT | Mod: ORL | Performed by: NURSE PRACTITIONER

## 2024-01-01 PROCEDURE — G0378 HOSPITAL OBSERVATION PER HR: HCPCS

## 2024-01-01 PROCEDURE — 99285 EMERGENCY DEPT VISIT HI MDM: CPT | Mod: 25

## 2024-01-01 PROCEDURE — 87635 SARS-COV-2 COVID-19 AMP PRB: CPT | Performed by: EMERGENCY MEDICINE

## 2024-01-01 PROCEDURE — 84484 ASSAY OF TROPONIN QUANT: CPT | Performed by: INTERNAL MEDICINE

## 2024-01-01 PROCEDURE — 82550 ASSAY OF CK (CPK): CPT | Performed by: INTERNAL MEDICINE

## 2024-01-01 PROCEDURE — 36415 COLL VENOUS BLD VENIPUNCTURE: CPT | Performed by: NURSE PRACTITIONER

## 2024-01-01 PROCEDURE — 71045 X-RAY EXAM CHEST 1 VIEW: CPT

## 2024-01-01 PROCEDURE — P9604 ONE-WAY ALLOW PRORATED TRIP: HCPCS | Performed by: NURSE PRACTITIONER

## 2024-01-01 PROCEDURE — 93244 EXT ECG>48HR<7D REV&INTERPJ: CPT | Performed by: NURSE PRACTITIONER

## 2024-01-01 PROCEDURE — 250N000011 HC RX IP 250 OP 636: Mod: JZ

## 2024-01-01 PROCEDURE — 85025 COMPLETE CBC W/AUTO DIFF WBC: CPT | Performed by: INTERNAL MEDICINE

## 2024-01-01 PROCEDURE — 93005 ELECTROCARDIOGRAM TRACING: CPT

## 2024-01-01 PROCEDURE — 83605 ASSAY OF LACTIC ACID: CPT | Performed by: INTERNAL MEDICINE

## 2024-01-01 PROCEDURE — 84484 ASSAY OF TROPONIN QUANT: CPT | Performed by: EMERGENCY MEDICINE

## 2024-01-01 PROCEDURE — 99239 HOSP IP/OBS DSCHRG MGMT >30: CPT | Performed by: HOSPITALIST

## 2024-01-01 PROCEDURE — 99349 HOME/RES VST EST MOD MDM 40: CPT | Performed by: NURSE PRACTITIONER

## 2024-01-01 PROCEDURE — 71046 X-RAY EXAM CHEST 2 VIEWS: CPT

## 2024-01-01 PROCEDURE — 99283 EMERGENCY DEPT VISIT LOW MDM: CPT | Mod: 25

## 2024-01-01 PROCEDURE — 96361 HYDRATE IV INFUSION ADD-ON: CPT

## 2024-01-01 PROCEDURE — 85027 COMPLETE CBC AUTOMATED: CPT | Performed by: INTERNAL MEDICINE

## 2024-01-01 PROCEDURE — 87149 DNA/RNA DIRECT PROBE: CPT | Performed by: HOSPITALIST

## 2024-01-01 PROCEDURE — 99222 1ST HOSP IP/OBS MODERATE 55: CPT | Performed by: INTERNAL MEDICINE

## 2024-01-01 PROCEDURE — 250N000013 HC RX MED GY IP 250 OP 250 PS 637: Performed by: EMERGENCY MEDICINE

## 2024-01-01 PROCEDURE — 36415 COLL VENOUS BLD VENIPUNCTURE: CPT | Performed by: EMERGENCY MEDICINE

## 2024-01-01 PROCEDURE — 93306 TTE W/DOPPLER COMPLETE: CPT | Mod: 26 | Performed by: INTERNAL MEDICINE

## 2024-01-01 PROCEDURE — 83605 ASSAY OF LACTIC ACID: CPT | Performed by: HOSPITALIST

## 2024-01-01 PROCEDURE — 83605 ASSAY OF LACTIC ACID: CPT

## 2024-01-01 PROCEDURE — 81001 URINALYSIS AUTO W/SCOPE: CPT | Performed by: EMERGENCY MEDICINE

## 2024-01-01 PROCEDURE — 99233 SBSQ HOSP IP/OBS HIGH 50: CPT | Performed by: INTERNAL MEDICINE

## 2024-01-01 PROCEDURE — 85014 HEMATOCRIT: CPT | Performed by: NURSE PRACTITIONER

## 2024-01-01 PROCEDURE — 80048 BASIC METABOLIC PNL TOTAL CA: CPT | Performed by: EMERGENCY MEDICINE

## 2024-01-01 PROCEDURE — 96376 TX/PRO/DX INJ SAME DRUG ADON: CPT

## 2024-01-01 PROCEDURE — 93306 TTE W/DOPPLER COMPLETE: CPT

## 2024-01-01 PROCEDURE — 36415 COLL VENOUS BLD VENIPUNCTURE: CPT | Mod: ORL | Performed by: NURSE PRACTITIONER

## 2024-01-01 PROCEDURE — 87637 SARSCOV2&INF A&B&RSV AMP PRB: CPT | Performed by: EMERGENCY MEDICINE

## 2024-01-01 PROCEDURE — G2211 COMPLEX E/M VISIT ADD ON: HCPCS | Performed by: STUDENT IN AN ORGANIZED HEALTH CARE EDUCATION/TRAINING PROGRAM

## 2024-01-01 PROCEDURE — 250N000009 HC RX 250: Performed by: EMERGENCY MEDICINE

## 2024-01-01 PROCEDURE — 250N000013 HC RX MED GY IP 250 OP 250 PS 637: Performed by: INTERNAL MEDICINE

## 2024-01-01 PROCEDURE — 84145 PROCALCITONIN (PCT): CPT | Performed by: EMERGENCY MEDICINE

## 2024-01-01 PROCEDURE — 99214 OFFICE O/P EST MOD 30 MIN: CPT | Performed by: STUDENT IN AN ORGANIZED HEALTH CARE EDUCATION/TRAINING PROGRAM

## 2024-01-01 PROCEDURE — 250N000011 HC RX IP 250 OP 636: Performed by: INTERNAL MEDICINE

## 2024-01-01 PROCEDURE — 85025 COMPLETE CBC W/AUTO DIFF WBC: CPT | Performed by: EMERGENCY MEDICINE

## 2024-01-01 PROCEDURE — 99205 OFFICE O/P NEW HI 60 MIN: CPT | Performed by: STUDENT IN AN ORGANIZED HEALTH CARE EDUCATION/TRAINING PROGRAM

## 2024-01-01 PROCEDURE — 83605 ASSAY OF LACTIC ACID: CPT | Performed by: EMERGENCY MEDICINE

## 2024-01-01 PROCEDURE — 120N000013 HC R&B IMCU

## 2024-01-01 PROCEDURE — 84481 FREE ASSAY (FT-3): CPT | Performed by: INTERNAL MEDICINE

## 2024-01-01 PROCEDURE — 85027 COMPLETE CBC AUTOMATED: CPT | Mod: ORL | Performed by: NURSE PRACTITIONER

## 2024-01-01 PROCEDURE — 87181 SC STD AGAR DILUTION PER AGT: CPT | Performed by: HOSPITALIST

## 2024-01-01 PROCEDURE — 99232 SBSQ HOSP IP/OBS MODERATE 35: CPT | Performed by: HOSPITALIST

## 2024-01-01 PROCEDURE — 99309 SBSQ NF CARE MODERATE MDM 30: CPT | Performed by: NURSE PRACTITIONER

## 2024-01-01 PROCEDURE — 258N000003 HC RX IP 258 OP 636: Mod: JZ | Performed by: EMERGENCY MEDICINE

## 2024-01-01 PROCEDURE — 83880 ASSAY OF NATRIURETIC PEPTIDE: CPT | Performed by: EMERGENCY MEDICINE

## 2024-01-01 PROCEDURE — 99285 EMERGENCY DEPT VISIT HI MDM: CPT

## 2024-01-01 PROCEDURE — 86481 TB AG RESPONSE T-CELL SUSP: CPT | Performed by: NURSE PRACTITIONER

## 2024-01-01 PROCEDURE — 80048 BASIC METABOLIC PNL TOTAL CA: CPT | Performed by: HOSPITALIST

## 2024-01-01 PROCEDURE — 80048 BASIC METABOLIC PNL TOTAL CA: CPT | Performed by: INTERNAL MEDICINE

## 2024-01-01 PROCEDURE — G0180 MD CERTIFICATION HHA PATIENT: HCPCS | Performed by: NURSE PRACTITIONER

## 2024-01-01 PROCEDURE — 84443 ASSAY THYROID STIM HORMONE: CPT | Mod: ORL | Performed by: NURSE PRACTITIONER

## 2024-01-01 PROCEDURE — 80307 DRUG TEST PRSMV CHEM ANLYZR: CPT | Performed by: INTERNAL MEDICINE

## 2024-01-01 PROCEDURE — 84439 ASSAY OF FREE THYROXINE: CPT | Performed by: EMERGENCY MEDICINE

## 2024-01-01 PROCEDURE — 97161 PT EVAL LOW COMPLEX 20 MIN: CPT | Mod: GP

## 2024-01-01 PROCEDURE — 84145 PROCALCITONIN (PCT): CPT

## 2024-01-01 PROCEDURE — 84443 ASSAY THYROID STIM HORMONE: CPT | Performed by: EMERGENCY MEDICINE

## 2024-01-01 PROCEDURE — 99305 1ST NF CARE MODERATE MDM 35: CPT | Performed by: INTERNAL MEDICINE

## 2024-01-01 PROCEDURE — 85610 PROTHROMBIN TIME: CPT

## 2024-01-01 PROCEDURE — G0179 MD RECERTIFICATION HHA PT: HCPCS | Performed by: NURSE PRACTITIONER

## 2024-01-01 PROCEDURE — 84439 ASSAY OF FREE THYROXINE: CPT | Performed by: INTERNAL MEDICINE

## 2024-01-01 PROCEDURE — 258N000003 HC RX IP 258 OP 636

## 2024-01-01 PROCEDURE — 80053 COMPREHEN METABOLIC PANEL: CPT | Mod: ORL | Performed by: NURSE PRACTITIONER

## 2024-01-01 PROCEDURE — 82248 BILIRUBIN DIRECT: CPT | Mod: ORL | Performed by: NURSE PRACTITIONER

## 2024-01-01 PROCEDURE — 250N000011 HC RX IP 250 OP 636: Performed by: HOSPITALIST

## 2024-01-01 PROCEDURE — 87040 BLOOD CULTURE FOR BACTERIA: CPT | Performed by: EMERGENCY MEDICINE

## 2024-01-01 PROCEDURE — 250N000009 HC RX 250: Performed by: INTERNAL MEDICINE

## 2024-01-01 PROCEDURE — 99207 PR NO BILLABLE SERVICE THIS VISIT: CPT

## 2024-01-01 PROCEDURE — 96374 THER/PROPH/DIAG INJ IV PUSH: CPT

## 2024-01-01 PROCEDURE — 82805 BLOOD GASES W/O2 SATURATION: CPT

## 2024-01-01 PROCEDURE — 82374 ASSAY BLOOD CARBON DIOXIDE: CPT | Performed by: EMERGENCY MEDICINE

## 2024-01-01 PROCEDURE — 99349 HOME/RES VST EST MOD MDM 40: CPT | Performed by: INTERNAL MEDICINE

## 2024-01-01 PROCEDURE — 87040 BLOOD CULTURE FOR BACTERIA: CPT | Performed by: INTERNAL MEDICINE

## 2024-01-01 PROCEDURE — 74177 CT ABD & PELVIS W/CONTRAST: CPT

## 2024-01-01 PROCEDURE — 83880 ASSAY OF NATRIURETIC PEPTIDE: CPT

## 2024-01-01 PROCEDURE — 99207 PR APP CREDIT; MD BILLING SHARED VISIT: CPT

## 2024-01-01 RX ORDER — HYDROCORTISONE 2.5 %
CREAM (GRAM) TOPICAL EVERY 6 HOURS PRN
COMMUNITY
Start: 2024-01-01

## 2024-01-01 RX ORDER — LIDOCAINE 40 MG/G
CREAM TOPICAL
Status: DISCONTINUED | OUTPATIENT
Start: 2024-01-01 | End: 2024-01-01 | Stop reason: HOSPADM

## 2024-01-01 RX ORDER — AMOXICILLIN 250 MG
2 CAPSULE ORAL 2 TIMES DAILY PRN
Status: DISCONTINUED | OUTPATIENT
Start: 2024-01-01 | End: 2024-01-01 | Stop reason: HOSPADM

## 2024-01-01 RX ORDER — ACETAMINOPHEN 325 MG/1
650 TABLET ORAL ONCE
Status: COMPLETED | OUTPATIENT
Start: 2024-01-01 | End: 2024-01-01

## 2024-01-01 RX ORDER — ONDANSETRON 4 MG/1
4 TABLET, ORALLY DISINTEGRATING ORAL EVERY 6 HOURS PRN
Status: DISCONTINUED | OUTPATIENT
Start: 2024-01-01 | End: 2024-01-01 | Stop reason: HOSPADM

## 2024-01-01 RX ORDER — CARBIDOPA/LEVODOPA 25MG-250MG
1 TABLET ORAL 3 TIMES DAILY
COMMUNITY
Start: 2024-01-01 | End: 2024-01-01

## 2024-01-01 RX ORDER — SENNA AND DOCUSATE SODIUM 50; 8.6 MG/1; MG/1
1 TABLET, FILM COATED ORAL
COMMUNITY
Start: 2024-01-01 | End: 2024-01-01

## 2024-01-01 RX ORDER — LEVODOPA AND CARBIDOPA 195; 48.75 MG/1; MG/1
3 CAPSULE, EXTENDED RELEASE ORAL 3 TIMES DAILY
Qty: 270 CAPSULE | Refills: 3 | Status: SHIPPED | OUTPATIENT
Start: 2024-01-01 | End: 2024-01-01

## 2024-01-01 RX ORDER — CARBIDOPA AND LEVODOPA 50; 200 MG/1; MG/1
1 TABLET, EXTENDED RELEASE ORAL ONCE
Status: COMPLETED | OUTPATIENT
Start: 2024-01-01 | End: 2024-01-01

## 2024-01-01 RX ORDER — LABETALOL HYDROCHLORIDE 5 MG/ML
10 INJECTION, SOLUTION INTRAVENOUS
Status: DISCONTINUED | OUTPATIENT
Start: 2024-01-01 | End: 2024-01-01 | Stop reason: HOSPADM

## 2024-01-01 RX ORDER — CARBIDOPA AND LEVODOPA 50; 200 MG/1; MG/1
1 TABLET, EXTENDED RELEASE ORAL AT BEDTIME
Status: DISCONTINUED | OUTPATIENT
Start: 2024-01-01 | End: 2024-01-01

## 2024-01-01 RX ORDER — ACETAMINOPHEN 500 MG
1000 TABLET ORAL 2 TIMES DAILY
Status: DISCONTINUED | OUTPATIENT
Start: 2024-01-01 | End: 2024-01-01 | Stop reason: HOSPADM

## 2024-01-01 RX ORDER — DONEPEZIL HYDROCHLORIDE 10 MG/1
10 TABLET, FILM COATED ORAL DAILY
Qty: 90 TABLET | Refills: 3 | Status: SHIPPED | OUTPATIENT
Start: 2024-01-01 | End: 2024-01-01 | Stop reason: DRUGHIGH

## 2024-01-01 RX ORDER — CALCIUM POLYCARBOPHIL 625 MG
1250 TABLET ORAL DAILY
COMMUNITY
Start: 2024-01-01 | End: 2024-01-01

## 2024-01-01 RX ORDER — CALCIUM POLYCARBOPHIL 625 MG 625 MG/1
2 TABLET ORAL DAILY
COMMUNITY

## 2024-01-01 RX ORDER — METRONIDAZOLE 10 MG/G
GEL TOPICAL DAILY
Qty: 60 G | Refills: 4 | Status: SHIPPED | OUTPATIENT
Start: 2024-01-01 | End: 2024-01-01

## 2024-01-01 RX ORDER — CARBIDOPA AND LEVODOPA 25; 100 MG/1; MG/1
1 TABLET, EXTENDED RELEASE ORAL AT BEDTIME
Qty: 30 TABLET | Refills: 3 | Status: SHIPPED | OUTPATIENT
Start: 2024-01-01 | End: 2024-01-01

## 2024-01-01 RX ORDER — HYDROCORTISONE 2.5 %
CREAM (GRAM) TOPICAL
Qty: 28.35 G | Refills: 97 | Status: SHIPPED | OUTPATIENT
Start: 2024-01-01 | End: 2024-01-01

## 2024-01-01 RX ORDER — CALCIUM POLYCARBOPHIL 625 MG 625 MG/1
2 TABLET ORAL DAILY
COMMUNITY
Start: 2024-01-01 | End: 2024-01-01

## 2024-01-01 RX ORDER — CARBIDOPA/LEVODOPA 25MG-250MG
1 TABLET ORAL 3 TIMES DAILY
Qty: 270 TABLET | Refills: 3 | Status: SHIPPED | OUTPATIENT
Start: 2024-01-01 | End: 2024-01-01

## 2024-01-01 RX ORDER — CARBIDOPA AND LEVODOPA 50; 200 MG/1; MG/1
1 TABLET, EXTENDED RELEASE ORAL AT BEDTIME
Qty: 90 TABLET | Refills: 3 | Status: SHIPPED | OUTPATIENT
Start: 2024-01-01 | End: 2024-01-01

## 2024-01-01 RX ORDER — SENNA AND DOCUSATE SODIUM 50; 8.6 MG/1; MG/1
1 TABLET, FILM COATED ORAL 2 TIMES DAILY PRN
Status: DISCONTINUED | OUTPATIENT
Start: 2024-01-01 | End: 2024-01-01

## 2024-01-01 RX ORDER — NYSTATIN 100000 [USP'U]/G
POWDER TOPICAL 2 TIMES DAILY
Qty: 30 G | Refills: 11 | Status: SHIPPED | OUTPATIENT
Start: 2024-01-01

## 2024-01-01 RX ORDER — CARBIDOPA AND LEVODOPA 25; 100 MG/1; MG/1
1 TABLET, EXTENDED RELEASE ORAL EVERY EVENING
Status: DISCONTINUED | OUTPATIENT
Start: 2024-01-01 | End: 2024-01-01

## 2024-01-01 RX ORDER — CARBIDOPA AND LEVODOPA 25; 100 MG/1; MG/1
1 TABLET, EXTENDED RELEASE ORAL ONCE
Status: COMPLETED | OUTPATIENT
Start: 2024-01-01 | End: 2024-01-01

## 2024-01-01 RX ORDER — DONEPEZIL HYDROCHLORIDE 10 MG/1
10 TABLET, FILM COATED ORAL AT BEDTIME
Status: DISCONTINUED | OUTPATIENT
Start: 2024-01-01 | End: 2024-01-01 | Stop reason: HOSPADM

## 2024-01-01 RX ORDER — HYDROCORTISONE 25 MG/G
CREAM TOPICAL 2 TIMES DAILY PRN
Qty: 30 G | Refills: 11 | Status: SHIPPED | OUTPATIENT
Start: 2024-01-01 | End: 2024-01-01

## 2024-01-01 RX ORDER — CARBIDOPA AND LEVODOPA 50; 200 MG/1; MG/1
1 TABLET, EXTENDED RELEASE ORAL AT BEDTIME
Qty: 90 TABLET | Refills: 97 | Status: SHIPPED | OUTPATIENT
Start: 2024-01-01

## 2024-01-01 RX ORDER — CARBIDOPA/LEVODOPA 25MG-250MG
1 TABLET ORAL 3 TIMES DAILY
Status: DISCONTINUED | OUTPATIENT
Start: 2024-01-01 | End: 2024-01-01

## 2024-01-01 RX ORDER — ACETAMINOPHEN 500 MG
1000 TABLET ORAL DAILY PRN
Status: DISCONTINUED | OUTPATIENT
Start: 2024-01-01 | End: 2024-01-01 | Stop reason: HOSPADM

## 2024-01-01 RX ORDER — SENNA AND DOCUSATE SODIUM 50; 8.6 MG/1; MG/1
1 TABLET, FILM COATED ORAL AT BEDTIME
Qty: 30 TABLET | Refills: 3 | Status: SHIPPED | OUTPATIENT
Start: 2024-01-01 | End: 2024-01-01

## 2024-01-01 RX ORDER — LABETALOL HYDROCHLORIDE 5 MG/ML
20 INJECTION, SOLUTION INTRAVENOUS ONCE
Status: COMPLETED | OUTPATIENT
Start: 2024-01-01 | End: 2024-01-01

## 2024-01-01 RX ORDER — CARBIDOPA AND LEVODOPA 25; 100 MG/1; MG/1
1 TABLET, EXTENDED RELEASE ORAL AT BEDTIME
COMMUNITY
Start: 2024-01-01 | End: 2024-01-01

## 2024-01-01 RX ORDER — DOCUSATE SODIUM 50MG AND SENNOSIDES 8.6MG 8.6; 5 MG/1; MG/1
TABLET, FILM COATED ORAL
Qty: 270 TABLET | Refills: 97 | Status: SHIPPED | OUTPATIENT
Start: 2024-01-01

## 2024-01-01 RX ORDER — CARBIDOPA AND LEVODOPA 25; 100 MG/1; MG/1
1 TABLET, EXTENDED RELEASE ORAL EVERY EVENING
Qty: 90 TABLET | Refills: 97 | Status: SHIPPED | OUTPATIENT
Start: 2024-01-01

## 2024-01-01 RX ORDER — ENTACAPONE 200 MG/1
200 TABLET ORAL 3 TIMES DAILY
Qty: 270 TABLET | Refills: 3 | Status: SHIPPED | OUTPATIENT
Start: 2024-01-01

## 2024-01-01 RX ORDER — CALCIUM POLYCARBOPHIL 625 MG
1250 TABLET ORAL DAILY
Qty: 180 TABLET | Refills: 3 | Status: SHIPPED | OUTPATIENT
Start: 2024-01-01 | End: 2024-01-01

## 2024-01-01 RX ORDER — AMOXICILLIN 250 MG
1 CAPSULE ORAL 2 TIMES DAILY PRN
Status: DISCONTINUED | OUTPATIENT
Start: 2024-01-01 | End: 2024-01-01 | Stop reason: HOSPADM

## 2024-01-01 RX ORDER — CARBIDOPA/LEVODOPA 25MG-250MG
1 TABLET ORAL 3 TIMES DAILY
Qty: 270 TABLET | Refills: 3 | Status: SHIPPED | OUTPATIENT
Start: 2024-01-01

## 2024-01-01 RX ORDER — PIPERACILLIN SODIUM, TAZOBACTAM SODIUM 4; .5 G/20ML; G/20ML
4.5 INJECTION, POWDER, LYOPHILIZED, FOR SOLUTION INTRAVENOUS EVERY 6 HOURS
Status: DISCONTINUED | OUTPATIENT
Start: 2024-01-01 | End: 2024-01-01

## 2024-01-01 RX ORDER — SENNA AND DOCUSATE SODIUM 50; 8.6 MG/1; MG/1
1 TABLET, FILM COATED ORAL AT BEDTIME
Qty: 30 TABLET | Refills: 11 | Status: SHIPPED | OUTPATIENT
Start: 2024-01-01 | End: 2024-01-01

## 2024-01-01 RX ORDER — CARBIDOPA AND LEVODOPA 25; 100 MG/1; MG/1
1 TABLET, EXTENDED RELEASE ORAL AT BEDTIME
Qty: 90 TABLET | Refills: 3 | Status: SHIPPED | OUTPATIENT
Start: 2024-01-01 | End: 2024-01-01

## 2024-01-01 RX ORDER — FUROSEMIDE 20 MG
20 TABLET ORAL DAILY
Qty: 30 TABLET | Refills: 11 | Status: SHIPPED | OUTPATIENT
Start: 2024-01-01

## 2024-01-01 RX ORDER — ACETAMINOPHEN 160 MG
1 TABLET,DISINTEGRATING ORAL ONCE
Status: COMPLETED | OUTPATIENT
Start: 2024-01-01 | End: 2024-01-01

## 2024-01-01 RX ORDER — SENNA AND DOCUSATE SODIUM 50; 8.6 MG/1; MG/1
1 TABLET, FILM COATED ORAL AT BEDTIME
COMMUNITY
Start: 2024-01-01 | End: 2024-01-01

## 2024-01-01 RX ORDER — CEFTRIAXONE 2 G/1
2 INJECTION, POWDER, FOR SOLUTION INTRAMUSCULAR; INTRAVENOUS EVERY 24 HOURS
Status: DISCONTINUED | OUTPATIENT
Start: 2024-01-01 | End: 2024-01-01

## 2024-01-01 RX ORDER — ENTACAPONE 200 MG/1
200 TABLET ORAL 3 TIMES DAILY
Status: DISCONTINUED | OUTPATIENT
Start: 2024-01-01 | End: 2024-01-01 | Stop reason: HOSPADM

## 2024-01-01 RX ORDER — ONDANSETRON 2 MG/ML
4 INJECTION INTRAMUSCULAR; INTRAVENOUS EVERY 6 HOURS PRN
Status: DISCONTINUED | OUTPATIENT
Start: 2024-01-01 | End: 2024-01-01 | Stop reason: HOSPADM

## 2024-01-01 RX ORDER — CARBIDOPA AND LEVODOPA 50; 200 MG/1; MG/1
1 TABLET, EXTENDED RELEASE ORAL AT BEDTIME
COMMUNITY
Start: 2024-01-01 | End: 2024-01-01

## 2024-01-01 RX ORDER — DONEPEZIL HYDROCHLORIDE 10 MG/1
10 TABLET, FILM COATED ORAL AT BEDTIME
Qty: 30 TABLET | Refills: 3 | Status: SHIPPED | OUTPATIENT
Start: 2024-01-01 | End: 2024-01-01

## 2024-01-01 RX ORDER — IOPAMIDOL 755 MG/ML
86 INJECTION, SOLUTION INTRAVASCULAR ONCE
Status: COMPLETED | OUTPATIENT
Start: 2024-01-01 | End: 2024-01-01

## 2024-01-01 RX ORDER — CARBIDOPA AND LEVODOPA 25; 100 MG/1; MG/1
1 TABLET, EXTENDED RELEASE ORAL AT BEDTIME
Status: DISCONTINUED | OUTPATIENT
Start: 2024-01-01 | End: 2024-01-01

## 2024-01-01 RX ORDER — DONEPEZIL HYDROCHLORIDE 5 MG/1
5 TABLET, FILM COATED ORAL AT BEDTIME
Qty: 90 TABLET | Refills: 3 | Status: SHIPPED | OUTPATIENT
Start: 2024-01-01 | End: 2024-01-01

## 2024-01-01 RX ADMIN — IOPAMIDOL 86 ML: 755 INJECTION, SOLUTION INTRAVENOUS at 10:33

## 2024-01-01 RX ADMIN — SODIUM CHLORIDE 1000 ML: 9 INJECTION, SOLUTION INTRAVENOUS at 18:59

## 2024-01-01 RX ADMIN — LEVODOPA AND CARBIDOPA 3 CAPSULE: 195; 48.75 CAPSULE, EXTENDED RELEASE ORAL at 06:19

## 2024-01-01 RX ADMIN — SODIUM CHLORIDE 1000 ML: 9 INJECTION, SOLUTION INTRAVENOUS at 09:19

## 2024-01-01 RX ADMIN — CARBIDOPA AND LEVODOPA 1 TABLET: 25; 100 TABLET, EXTENDED RELEASE ORAL at 19:46

## 2024-01-01 RX ADMIN — CARBIDOPA AND LEVODOPA 1 TABLET: 50; 200 TABLET, EXTENDED RELEASE ORAL at 22:47

## 2024-01-01 RX ADMIN — ENTACAPONE 200 MG: 200 TABLET, FILM COATED ORAL at 13:34

## 2024-01-01 RX ADMIN — SODIUM CHLORIDE 1000 ML: 9 INJECTION, SOLUTION INTRAVENOUS at 10:41

## 2024-01-01 RX ADMIN — LEVODOPA AND CARBIDOPA 3 CAPSULE: 195; 48.75 CAPSULE, EXTENDED RELEASE ORAL at 14:53

## 2024-01-01 RX ADMIN — PIPERACILLIN AND TAZOBACTAM 4.5 G: 4; .5 INJECTION, POWDER, FOR SOLUTION INTRAVENOUS at 08:19

## 2024-01-01 RX ADMIN — LABETALOL HYDROCHLORIDE 10 MG: 5 INJECTION INTRAVENOUS at 18:11

## 2024-01-01 RX ADMIN — PIPERACILLIN AND TAZOBACTAM 4.5 G: 4; .5 INJECTION, POWDER, FOR SOLUTION INTRAVENOUS at 22:15

## 2024-01-01 RX ADMIN — DONEPEZIL HYDROCHLORIDE 10 MG: 10 TABLET ORAL at 20:23

## 2024-01-01 RX ADMIN — LABETALOL HYDROCHLORIDE 20 MG: 5 INJECTION, SOLUTION INTRAVENOUS at 04:17

## 2024-01-01 RX ADMIN — ENTACAPONE 200 MG: 200 TABLET, FILM COATED ORAL at 08:20

## 2024-01-01 RX ADMIN — ENTACAPONE 200 MG: 200 TABLET, FILM COATED ORAL at 09:18

## 2024-01-01 RX ADMIN — ENTACAPONE 200 MG: 200 TABLET, FILM COATED ORAL at 22:16

## 2024-01-01 RX ADMIN — ACETAMINOPHEN 1000 MG: 500 TABLET, FILM COATED ORAL at 08:44

## 2024-01-01 RX ADMIN — PIPERACILLIN AND TAZOBACTAM 4.5 G: 4; .5 INJECTION, POWDER, FOR SOLUTION INTRAVENOUS at 13:34

## 2024-01-01 RX ADMIN — LEVODOPA AND CARBIDOPA 3 CAPSULE: 195; 48.75 CAPSULE, EXTENDED RELEASE ORAL at 18:15

## 2024-01-01 RX ADMIN — CEFTRIAXONE SODIUM 2 G: 2 INJECTION, POWDER, FOR SOLUTION INTRAMUSCULAR; INTRAVENOUS at 10:03

## 2024-01-01 RX ADMIN — ACETAMINOPHEN 1000 MG: 500 TABLET, FILM COATED ORAL at 08:18

## 2024-01-01 RX ADMIN — SODIUM CHLORIDE, POTASSIUM CHLORIDE, SODIUM LACTATE AND CALCIUM CHLORIDE 500 ML: 600; 310; 30; 20 INJECTION, SOLUTION INTRAVENOUS at 04:20

## 2024-01-01 RX ADMIN — ENTACAPONE 200 MG: 200 TABLET, FILM COATED ORAL at 13:55

## 2024-01-01 RX ADMIN — LEVODOPA AND CARBIDOPA 3 CAPSULE: 195; 48.75 CAPSULE, EXTENDED RELEASE ORAL at 15:36

## 2024-01-01 RX ADMIN — LEVODOPA AND CARBIDOPA 3 CAPSULE: 195; 48.75 CAPSULE, EXTENDED RELEASE ORAL at 15:11

## 2024-01-01 RX ADMIN — LEVODOPA AND CARBIDOPA 3 CAPSULE: 195; 48.75 CAPSULE, EXTENDED RELEASE ORAL at 06:50

## 2024-01-01 RX ADMIN — LEVODOPA AND CARBIDOPA 3 CAPSULE: 195; 48.75 CAPSULE, EXTENDED RELEASE ORAL at 06:30

## 2024-01-01 RX ADMIN — LEVODOPA AND CARBIDOPA 3 CAPSULE: 195; 48.75 CAPSULE, EXTENDED RELEASE ORAL at 06:06

## 2024-01-01 RX ADMIN — APIXABAN 5 MG: 5 TABLET, FILM COATED ORAL at 22:16

## 2024-01-01 RX ADMIN — DONEPEZIL HYDROCHLORIDE 10 MG: 10 TABLET ORAL at 22:10

## 2024-01-01 RX ADMIN — ACETAMINOPHEN 1000 MG: 500 TABLET, FILM COATED ORAL at 08:49

## 2024-01-01 RX ADMIN — ENTACAPONE 200 MG: 200 TABLET, FILM COATED ORAL at 08:45

## 2024-01-01 RX ADMIN — APIXABAN 5 MG: 5 TABLET, FILM COATED ORAL at 08:50

## 2024-01-01 RX ADMIN — SODIUM CHLORIDE 1000 ML: 9 INJECTION, SOLUTION INTRAVENOUS at 13:46

## 2024-01-01 RX ADMIN — APIXABAN 5 MG: 5 TABLET, FILM COATED ORAL at 19:55

## 2024-01-01 RX ADMIN — CARBIDOPA AND LEVODOPA 1 TABLET: 25; 250 TABLET ORAL at 08:50

## 2024-01-01 RX ADMIN — PIPERACILLIN AND TAZOBACTAM 4.5 G: 4; .5 INJECTION, POWDER, FOR SOLUTION INTRAVENOUS at 01:05

## 2024-01-01 RX ADMIN — LABETALOL HYDROCHLORIDE 10 MG: 5 INJECTION INTRAVENOUS at 19:48

## 2024-01-01 RX ADMIN — APIXABAN 5 MG: 5 TABLET, FILM COATED ORAL at 08:44

## 2024-01-01 RX ADMIN — CEFTRIAXONE SODIUM 2 G: 2 INJECTION, POWDER, FOR SOLUTION INTRAMUSCULAR; INTRAVENOUS at 09:24

## 2024-01-01 RX ADMIN — LEVODOPA AND CARBIDOPA 3 CAPSULE: 195; 48.75 CAPSULE, EXTENDED RELEASE ORAL at 19:57

## 2024-01-01 RX ADMIN — ACETAMINOPHEN 1000 MG: 500 TABLET, FILM COATED ORAL at 20:20

## 2024-01-01 RX ADMIN — SODIUM CHLORIDE 66 ML: 9 INJECTION, SOLUTION INTRAVENOUS at 10:33

## 2024-01-01 RX ADMIN — ENTACAPONE 200 MG: 200 TABLET, FILM COATED ORAL at 20:20

## 2024-01-01 RX ADMIN — ENTACAPONE 200 MG: 200 TABLET, FILM COATED ORAL at 14:53

## 2024-01-01 RX ADMIN — ACETAMINOPHEN 1000 MG: 500 TABLET, FILM COATED ORAL at 21:42

## 2024-01-01 RX ADMIN — ACETAMINOPHEN 1000 MG: 500 TABLET, FILM COATED ORAL at 22:15

## 2024-01-01 RX ADMIN — DONEPEZIL HYDROCHLORIDE 10 MG: 10 TABLET ORAL at 22:16

## 2024-01-01 RX ADMIN — PIPERACILLIN AND TAZOBACTAM 4.5 G: 4; .5 INJECTION, POWDER, FOR SOLUTION INTRAVENOUS at 14:07

## 2024-01-01 RX ADMIN — LEVODOPA AND CARBIDOPA 3 CAPSULE: 195; 48.75 CAPSULE, EXTENDED RELEASE ORAL at 18:05

## 2024-01-01 RX ADMIN — CARBIDOPA AND LEVODOPA 1 TABLET: 50; 200 TABLET, EXTENDED RELEASE ORAL at 19:46

## 2024-01-01 RX ADMIN — APIXABAN 5 MG: 5 TABLET, FILM COATED ORAL at 20:20

## 2024-01-01 RX ADMIN — APIXABAN 5 MG: 5 TABLET, FILM COATED ORAL at 21:42

## 2024-01-01 RX ADMIN — AMOXICILLIN AND CLAVULANATE POTASSIUM 1 TABLET: 875; 125 TABLET, FILM COATED ORAL at 11:54

## 2024-01-01 RX ADMIN — ACETAMINOPHEN 650 MG: 325 TABLET, FILM COATED ORAL at 22:03

## 2024-01-01 RX ADMIN — ACETAMINOPHEN 1000 MG: 500 TABLET, FILM COATED ORAL at 08:59

## 2024-01-01 RX ADMIN — ENTACAPONE 200 MG: 200 TABLET, FILM COATED ORAL at 08:50

## 2024-01-01 RX ADMIN — ENTACAPONE 200 MG: 200 TABLET, FILM COATED ORAL at 22:56

## 2024-01-01 RX ADMIN — APIXABAN 5 MG: 5 TABLET, FILM COATED ORAL at 08:59

## 2024-01-01 RX ADMIN — ENTACAPONE 200 MG: 200 TABLET, FILM COATED ORAL at 19:56

## 2024-01-01 RX ADMIN — DONEPEZIL HYDROCHLORIDE 10 MG: 10 TABLET ORAL at 22:47

## 2024-01-01 RX ADMIN — APIXABAN 5 MG: 5 TABLET, FILM COATED ORAL at 08:18

## 2024-01-01 ASSESSMENT — ACTIVITIES OF DAILY LIVING (ADL)
ADLS_ACUITY_SCORE: 50
ADLS_ACUITY_SCORE: 42
ADLS_ACUITY_SCORE: 42
ADLS_ACUITY_SCORE: 45
ADLS_ACUITY_SCORE: 45
ADLS_ACUITY_SCORE: 54
ADLS_ACUITY_SCORE: 42
ADLS_ACUITY_SCORE: 36
ADLS_ACUITY_SCORE: 47
ADLS_ACUITY_SCORE: 50
ADLS_ACUITY_SCORE: 39
ADLS_ACUITY_SCORE: 42
ADLS_ACUITY_SCORE: 47
ADLS_ACUITY_SCORE: 46
ADLS_ACUITY_SCORE: 39
ADLS_ACUITY_SCORE: 44
ADLS_ACUITY_SCORE: 36
ADLS_ACUITY_SCORE: 42
ADLS_ACUITY_SCORE: 44
ADLS_ACUITY_SCORE: 39
ADLS_ACUITY_SCORE: 47
ADLS_ACUITY_SCORE: 50
ADLS_ACUITY_SCORE: 50
ADLS_ACUITY_SCORE: 46
ADLS_ACUITY_SCORE: 42
ADLS_ACUITY_SCORE: 36
ADLS_ACUITY_SCORE: 45
ADLS_ACUITY_SCORE: 42
ADLS_ACUITY_SCORE: 36
ADLS_ACUITY_SCORE: 50
ADLS_ACUITY_SCORE: 46
ADLS_ACUITY_SCORE: 43
ADLS_ACUITY_SCORE: 54
ADLS_ACUITY_SCORE: 36
ADLS_ACUITY_SCORE: 50
ADLS_ACUITY_SCORE: 36
ADLS_ACUITY_SCORE: 42
ADLS_ACUITY_SCORE: 42
ADLS_ACUITY_SCORE: 50
ADLS_ACUITY_SCORE: 42
ADLS_ACUITY_SCORE: 44
ADLS_ACUITY_SCORE: 42
ADLS_ACUITY_SCORE: 36
ADLS_ACUITY_SCORE: 46
ADLS_ACUITY_SCORE: 36
ADLS_ACUITY_SCORE: 36
ADLS_ACUITY_SCORE: 50
ADLS_ACUITY_SCORE: 50
ADLS_ACUITY_SCORE: 42
ADLS_ACUITY_SCORE: 46
ADLS_ACUITY_SCORE: 46
ADLS_ACUITY_SCORE: 50
ADLS_ACUITY_SCORE: 45
ADLS_ACUITY_SCORE: 44
ADLS_ACUITY_SCORE: 54
ADLS_ACUITY_SCORE: 54
ADLS_ACUITY_SCORE: 36
ADLS_ACUITY_SCORE: 42
ADLS_ACUITY_SCORE: 47
DEPENDENT_IADLS:: CLEANING;COOKING;LAUNDRY;SHOPPING;MEAL PREPARATION;MEDICATION MANAGEMENT;TRANSPORTATION
ADLS_ACUITY_SCORE: 42
ADLS_ACUITY_SCORE: 50
ADLS_ACUITY_SCORE: 42
ADLS_ACUITY_SCORE: 50
ADLS_ACUITY_SCORE: 42
ADLS_ACUITY_SCORE: 50
ADLS_ACUITY_SCORE: 42
ADLS_ACUITY_SCORE: 36
ADLS_ACUITY_SCORE: 36
ADLS_ACUITY_SCORE: 42
ADLS_ACUITY_SCORE: 36
ADLS_ACUITY_SCORE: 46
ADLS_ACUITY_SCORE: 54
ADLS_ACUITY_SCORE: 46
ADLS_ACUITY_SCORE: 54
ADLS_ACUITY_SCORE: 50
ADLS_ACUITY_SCORE: 45
ADLS_ACUITY_SCORE: 50
ADLS_ACUITY_SCORE: 46
ADLS_ACUITY_SCORE: 36
ADLS_ACUITY_SCORE: 50
ADLS_ACUITY_SCORE: 43
ADLS_ACUITY_SCORE: 45
ADLS_ACUITY_SCORE: 45
ADLS_ACUITY_SCORE: 54
ADLS_ACUITY_SCORE: 46
ADLS_ACUITY_SCORE: 50
ADLS_ACUITY_SCORE: 46
ADLS_ACUITY_SCORE: 36
ADLS_ACUITY_SCORE: 42
ADLS_ACUITY_SCORE: 36
ADLS_ACUITY_SCORE: 47
ADLS_ACUITY_SCORE: 54
ADLS_ACUITY_SCORE: 42
ADLS_ACUITY_SCORE: 47
ADLS_ACUITY_SCORE: 50
ADLS_ACUITY_SCORE: 50
ADLS_ACUITY_SCORE: 47
ADLS_ACUITY_SCORE: 42
ADLS_ACUITY_SCORE: 42

## 2024-01-01 ASSESSMENT — UNIFIED PARKINSONS DISEASE RATING SCALE (UPDRS)
AMPLITUDE_RUE: (2) MILD: > 1 CM BUT < 3 CM IN MAXIMAL AMPLITUDE.
CONSTANCY_TREMOR_ATREST: (4) SEVERE: TREMOR AT REST IS PRESENT > 75% OF THE ENTIRE EXAMINATION PERIOD.
RIGIDITY_RLE: (1) SLIGHT: RIGIDITY ONLY DETECTED WITH ACTIVATION MANEUVER.
AMPLITUDE_LIP_JAW: (0) NORMAL: NO TREMOR.
TOTAL_SCORE_LEFT: 7
LEG_AGILITY_RIGHT: (1) SLIGHT: ANY OF THE FOLLOWING: A) THE REGULAR RHYTHM IS BROKEN WITH ONE WITH ONE OR TWO INTERRUPTIONS OR HESITATIONS OF THE MOVEMENT  B) SLIGHT SLOWING  C) THE AMPLITUDE DECREMENTS NEAR THE END OF THE 10 MOVEMENTS.
AMPLITUDE_LUE: (0) NORMAL: NO TREMOR.
TOTAL_SCORE: 12
RIGIDITY_LLE: (2) MILD: RIGIDITY DETECTED WITHOUT THE ACTIVATION MANEUVER. FULL RANGE OF MOTION IS EASILY ACHIEVED.
AMPLITUDE_RUE: (2) MILD: > 1 CM BUT < 3 CM IN MAXIMAL AMPLITUDE.
PRONATION_SUPINATION_RIGHT: (2) MILD: ANY OF THE FOLLOWING: A) 3 TO 5 INTERRUPTIONS DURING TAPPING  B) MILD SLOWING  C) THE AMPLITUDE DECREMENTS MIDWAY IN THE 10-MOVEMENT SEQUENCE.
PARKINSONS_MEDS: ON
CONSTANCY_TREMOR_ATREST: (4) SEVERE: TREMOR AT REST IS PRESENT > 75% OF THE ENTIRE EXAMINATION PERIOD.
PRONATION_SUPINATION_LEFT: (2) MILD: ANY OF THE FOLLOWING: A) 3 TO 5 INTERRUPTIONS DURING TAPPING  B) MILD SLOWING  C) THE AMPLITUDE DECREMENTS MIDWAY IN THE 10-MOVEMENT SEQUENCE.
SPONTANEITY_OF_MOVEMENT: (3) MODERATE: MODERATE GLOBAL SLOWNESS AND POVERTY OF MOVEMENTS.
LEG_AGILITY_LEFT: (0) NORMAL: NO PROBLEMS.
SPEECH: (1) SLIGHT: LOSS OF MODULATION, DICTION OR VOLUME, BUT STILL ALL WORDS EASY TO UNDERSTAND.
SPEECH: (1) SLIGHT: LOSS OF MODULATION, DICTION OR VOLUME, BUT STILL ALL WORDS EASY TO UNDERSTAND.
FINGER_TAPPING_RIGHT: (3) MODERATE: ANY OF THE FOLLOWING: A) MORE THAN 5 INTERRUPTIONS OR AT LEAST ONE LONGER ARREST (FREEZE) IN ONGOING MOVEMENT  B) MODERATE SLOWING  C) THE AMPLITUDE DECREMENTS STARTING AFTER THE FIRST MOVEMENT.
AMPLITUDE_LLE: (0) NORMAL: NO TREMOR.
PRONATION_SUPINATION_RIGHT: (3) MODERATE: ANY OF THE FOLLOWING: A) MORE THAN 5 INTERRUPTIONS OR AT LEAST ONE LONGER ARREST (FREEZE) IN ONGOING MOVEMENT  B) MODERATE SLOWING  C) THE AMPLITUDE DECREMENTS STARTING AFTER THE FIRST MOVEMENT.
AMPLITUDE_LIP_JAW: (0) NORMAL: NO TREMOR.
RIGIDITY_RUE: (2) MILD: RIGIDITY DETECTED WITHOUT THE ACTIVATION MANEUVER. FULL RANGE OF MOTION IS EASILY ACHIEVED.
AMPLITUDE_LLE: (0) NORMAL: NO TREMOR.
PARKINSONS_MEDS: OFF
POSTURAL_STABILITY: (3) MODERATE: STANDS SAFELY, BUT WITH ABSENCE OF POSTURAL RESPONSE, FALLS IF NOT CAUGHT BY EXAMINER.
FREEZING_GAIT: (0) NORMAL: NO FREEZING.
DYSKINESIAS_PRESENT: NO
AMPLITUDE_RLE: (0) NORMAL: NO TREMOR.
FINGER_TAPPING_LEFT: (3) MODERATE: ANY OF THE FOLLOWING: A) MORE THAN 5 INTERRUPTIONS OR AT LEAST ONE LONGER ARREST (FREEZE) IN ONGOING MOVEMENT  B) MODERATE SLOWING  C) THE AMPLITUDE DECREMENTS STARTING AFTER THE FIRST MOVEMENT.
FACIAL_EXPRESSION: (3) MASKED FACIES WITH LIPS PARTED SOME OF THE TIME WHEN THE MOUTH IS AT REST.
AMPLITUDE_LIP_JAW: (0) NORMAL: NO TREMOR.
AMPLITUDE_RLE: (0) NORMAL: NO TREMOR.
HANDMOVEMENTS_RIGHT: (2) MILD: ANY OF THE FOLLOWING: A) 3 TO 5 INTERRUPTIONS DURING TAPPING  B) MILD SLOWING  C) THE AMPLITUDE DECREMENTS MIDWAY IN THE 10-MOVEMENT SEQUENCE.
RIGIDITY_NECK: (1) SLIGHT: RIGIDITY ONLY DETECTED WITH ACTIVATION MANEUVER.
RIGIDITY_RUE: (1) SLIGHT: RIGIDITY ONLY DETECTED WITH ACTIVATION MANEUVER.
DYSKINESIAS_PRESENT: NO
AMPLITUDE_LUE: (0) NORMAL: NO TREMOR.
TOTAL_SCORE: 42
SPEECH: (1) SLIGHT: LOSS OF MODULATION, DICTION OR VOLUME, BUT STILL ALL WORDS EASY TO UNDERSTAND.
CONSTANCY_TREMOR_ATREST: (3) MODERATE: TREMOR AT REST IS PRESENT 51-75% OF THE ENTIRE EXAMINATION PERIOD.
FINGER_TAPPING_RIGHT: (1) SLIGHT: ANY OF THE FOLLOWING: A) THE REGULAR RHYTHM IS BROKEN WITH ONE WITH ONE OR TWO INTERRUPTIONS OR HESITATIONS OF THE MOVEMENT  B) SLIGHT SLOWING  C) THE AMPLITUDE DECREMENTS NEAR THE END OF THE 10 MOVEMENTS.
RIGIDITY_LUE: (1) SLIGHT: RIGIDITY ONLY DETECTED WITH ACTIVATION MANEUVER.
AXIAL_SCORE: 19
MOVEMENTS_INTERFERE_WITH_RATINGS: NO
LEG_AGILITY_LEFT: (1) SLIGHT: ANY OF THE FOLLOWING: A) THE REGULAR RHYTHM IS BROKEN WITH ONE WITH ONE OR TWO INTERRUPTIONS OR HESITATIONS OF THE MOVEMENT  B) SLIGHT SLOWING  C) THE AMPLITUDE DECREMENTS NEAR THE END OF THE 10 MOVEMENTS.
LEG_AGILITY_RIGHT: (0) NORMAL: NO PROBLEMS.
HANDMOVEMENTS_LEFT: (3) MODERATE: ANY OF THE FOLLOWING: A) MORE THAN 5 INTERRUPTIONS OR AT LEAST ONE LONGER ARREST (FREEZE) IN ONGOING MOVEMENT  B) MODERATE SLOWING  C) THE AMPLITUDE DECREMENTS STARTING AFTER THE FIRST MOVEMENT.
HANDMOVEMENTS_LEFT: (1) SLIGHT: ANY OF THE FOLLOWING: A) THE REGULAR RHYTHM IS BROKEN WITH ONE WITH ONE OR TWO INTERRUPTIONS OR HESITATIONS OF THE MOVEMENT  B) SLIGHT SLOWING  C) THE AMPLITUDE DECREMENTS NEAR THE END OF THE 10 MOVEMENTS.
HANDMOVEMENTS_LEFT: (3) MODERATE: ANY OF THE FOLLOWING: A) MORE THAN 5 INTERRUPTIONS OR AT LEAST ONE LONGER ARREST (FREEZE) IN ONGOING MOVEMENT  B) MODERATE SLOWING  C) THE AMPLITUDE DECREMENTS STARTING AFTER THE FIRST MOVEMENT.
AMPLITUDE_LLE: (0) NORMAL: NO TREMOR.
TOTAL_SCORE: 12
MOVEMENTS_INTERFERE_WITH_RATINGS: NO
DYSKINESIAS_PRESENT: NO
FINGER_TAPPING_LEFT: (2) MILD: ANY OF THE FOLLOWING: A) 3 TO 5 INTERRUPTIONS DURING TAPPING  B) MILD SLOWING  C) THE AMPLITUDE DECREMENTS MIDWAY IN THE 10-MOVEMENT SEQUENCE.
ARISING_CHAIR: (3) MODERATE: NEEDS TO PUSH OFF, BUT TENDS TO FALL BACK, OR MAY HAVE TO TRY MORE THAN ONE TIME USING ARMS OF CHAIR, BUT CAN GET UP WITHOUT HELP.
TOETAPPING_RIGHT: (0) NORMAL: NO PROBLEMS.
FREEZING_GAIT: (0) NORMAL: NO FREEZING.
TOETAPPING_LEFT: (1) SLIGHT: ANY OF THE FOLLOWING: A) THE REGULAR RHYTHM IS BROKEN WITH ONE WITH ONE OR TWO INTERRUPTIONS OR HESITATIONS OF THE MOVEMENT  B) SLIGHT SLOWING  C) THE AMPLITUDE DECREMENTS NEAR THE END OF THE 10 MOVEMENTS.
AMPLITUDE_LUE: (0) NORMAL: NO TREMOR.
RIGIDITY_LLE: (0) NORMAL: NO RIGIDITY.
HANDMOVEMENTS_RIGHT: (3) MODERATE: ANY OF THE FOLLOWING: A) MORE THAN 5 INTERRUPTIONS OR AT LEAST ONE LONGER ARREST (FREEZE) IN ONGOING MOVEMENT  B) MODERATE SLOWING  C) THE AMPLITUDE DECREMENTS STARTING AFTER THE FIRST MOVEMENT.
POSTURE: (3) MODERATE: STOOPED POSTURE, SCOLIOSIS, OR LEANING TO ONE SIDE THAT CANNOT BE CORRECTED VOLITIONALLY TO A NORMAL POSTURE BY THE PATIENT.
AMPLITUDE_RLE: (0) NORMAL: NO TREMOR.
PRONATION_SUPINATION_RIGHT: (2) MILD: ANY OF THE FOLLOWING: A) 3 TO 5 INTERRUPTIONS DURING TAPPING  B) MILD SLOWING  C) THE AMPLITUDE DECREMENTS MIDWAY IN THE 10-MOVEMENT SEQUENCE.
PRONATION_SUPINATION_LEFT: (3) MODERATE: ANY OF THE FOLLOWING: A) MORE THAN 5 INTERRUPTIONS OR AT LEAST ONE LONGER ARREST (FREEZE) IN ONGOING MOVEMENT  B) MODERATE SLOWING  C) THE AMPLITUDE DECREMENTS STARTING AFTER THE FIRST MOVEMENT.
RIGIDITY_RLE: (2) MILD: RIGIDITY DETECTED WITHOUT THE ACTIVATION MANEUVER. FULL RANGE OF MOTION IS EASILY ACHIEVED.
HANDMOVEMENTS_RIGHT: (1) SLIGHT: ANY OF THE FOLLOWING: A) THE REGULAR RHYTHM IS BROKEN WITH ONE WITH ONE OR TWO INTERRUPTIONS OR HESITATIONS OF THE MOVEMENT  B) SLIGHT SLOWING  C) THE AMPLITUDE DECREMENTS NEAR THE END OF THE 10 MOVEMENTS.
POSTURAL_STABILITY: (3) MODERATE: STANDS SAFELY, BUT WITH ABSENCE OF POSTURAL RESPONSE, FALLS IF NOT CAUGHT BY EXAMINER.
PRONATION_SUPINATION_LEFT: (3) MODERATE: ANY OF THE FOLLOWING: A) MORE THAN 5 INTERRUPTIONS OR AT LEAST ONE LONGER ARREST (FREEZE) IN ONGOING MOVEMENT  B) MODERATE SLOWING  C) THE AMPLITUDE DECREMENTS STARTING AFTER THE FIRST MOVEMENT.
AMPLITUDE_RUE: (2) MILD: > 1 CM BUT < 3 CM IN MAXIMAL AMPLITUDE.
FACIAL_EXPRESSION: (2) MILD: IN ADDITION TO DECREASED EYE-BLINK FREQUENCY, MASKED FACIES PRESENT IN THE LOWER FACE AS WELL, NAMELY FEWER MOVEMENTS AROUND THE MOUTH, SUCH AS LESS SPONTANEOUS SMILING, BUT LIPS NOT PARTED.
SPONTANEITY_OF_MOVEMENT: (3) MODERATE: MODERATE GLOBAL SLOWNESS AND POVERTY OF MOVEMENTS.
RIGIDITY_NECK: (1) SLIGHT: RIGIDITY ONLY DETECTED WITH ACTIVATION MANEUVER.
FACIAL_EXPRESSION: (2) MILD: IN ADDITION TO DECREASED EYE-BLINK FREQUENCY, MASKED FACIES PRESENT IN THE LOWER FACE AS WELL, NAMELY FEWER MOVEMENTS AROUND THE MOUTH, SUCH AS LESS SPONTANEOUS SMILING, BUT LIPS NOT PARTED.
PARKINSONS_MEDS: OFF
FINGER_TAPPING_RIGHT: (3) MODERATE: ANY OF THE FOLLOWING: A) MORE THAN 5 INTERRUPTIONS OR AT LEAST ONE LONGER ARREST (FREEZE) IN ONGOING MOVEMENT  B) MODERATE SLOWING  C) THE AMPLITUDE DECREMENTS STARTING AFTER THE FIRST MOVEMENT.
RIGIDITY_LUE: (1) SLIGHT: RIGIDITY ONLY DETECTED WITH ACTIVATION MANEUVER.
TOETAPPING_RIGHT: (1) SLIGHT: ANY OF THE FOLLOWING: A) THE REGULAR RHYTHM IS BROKEN WITH ONE WITH ONE OR TWO INTERRUPTIONS OR HESITATIONS OF THE MOVEMENT  B) SLIGHT SLOWING  C) THE AMPLITUDE DECREMENTS NEAR THE END OF THE 10 MOVEMENTS.
TOTAL_SCORE_LEFT: 13
TOETAPPING_LEFT: (1) SLIGHT: ANY OF THE FOLLOWING: A) THE REGULAR RHYTHM IS BROKEN WITH ONE WITH ONE OR TWO INTERRUPTIONS OR HESITATIONS OF THE MOVEMENT  B) SLIGHT SLOWING  C) THE AMPLITUDE DECREMENTS NEAR THE END OF THE 10 MOVEMENTS.
GAIT: (3) MODERATE: REQUIRES AN ASSISTANCE DEVICE FOR SAFE WALKING (WALKING STICK, WALKER) BUT NOT A PERSON.
FINGER_TAPPING_LEFT: (2) MILD: ANY OF THE FOLLOWING: A) 3 TO 5 INTERRUPTIONS DURING TAPPING  B) MILD SLOWING  C) THE AMPLITUDE DECREMENTS MIDWAY IN THE 10-MOVEMENT SEQUENCE.
POSTURE: (3) MODERATE: STOOPED POSTURE, SCOLIOSIS, OR LEANING TO ONE SIDE THAT CANNOT BE CORRECTED VOLITIONALLY TO A NORMAL POSTURE BY THE PATIENT.
GAIT: (3) MODERATE: REQUIRES AN ASSISTANCE DEVICE FOR SAFE WALKING (WALKING STICK, WALKER) BUT NOT A PERSON.

## 2024-01-01 ASSESSMENT — COLUMBIA-SUICIDE SEVERITY RATING SCALE - C-SSRS
6. HAVE YOU EVER DONE ANYTHING, STARTED TO DO ANYTHING, OR PREPARED TO DO ANYTHING TO END YOUR LIFE?: NO
1. IN THE PAST MONTH, HAVE YOU WISHED YOU WERE DEAD OR WISHED YOU COULD GO TO SLEEP AND NOT WAKE UP?: NO
2. HAVE YOU ACTUALLY HAD ANY THOUGHTS OF KILLING YOURSELF IN THE PAST MONTH?: NO
2. HAVE YOU ACTUALLY HAD ANY THOUGHTS OF KILLING YOURSELF IN THE PAST MONTH?: NO
1. IN THE PAST MONTH, HAVE YOU WISHED YOU WERE DEAD OR WISHED YOU COULD GO TO SLEEP AND NOT WAKE UP?: NO
6. HAVE YOU EVER DONE ANYTHING, STARTED TO DO ANYTHING, OR PREPARED TO DO ANYTHING TO END YOUR LIFE?: NO

## 2024-01-01 ASSESSMENT — PATIENT HEALTH QUESTIONNAIRE - PHQ9
10. IF YOU CHECKED OFF ANY PROBLEMS, HOW DIFFICULT HAVE THESE PROBLEMS MADE IT FOR YOU TO DO YOUR WORK, TAKE CARE OF THINGS AT HOME, OR GET ALONG WITH OTHER PEOPLE: EXTREMELY DIFFICULT
SUM OF ALL RESPONSES TO PHQ QUESTIONS 1-9: 13
SUM OF ALL RESPONSES TO PHQ QUESTIONS 1-9: 13

## 2024-01-29 NOTE — LETTER
2024         RE: Jennifer Venegas  C/o Jana Lin  [address] [facility] [address]        Dear Colleague,    Thank you for referring your patient, Jennifer Venegas, to the [facility]. Please see a copy of my visit note below.    Department of Neurology  Movement Disorders Division   New Patient Visit    Patient: Jennifer Venegas   MRN: [id number]   : 1940   Date of Visit: 2024    CC: Parkinson's disease    HPI:  Jennifer Venegas is a 83 year old right handed woman with Parkinson's disease, osteoarthritis, paroxysmal atrial fibrillation on apixaban, HLD and dementia who presents to establish care for Parkinson's disease.     Ms. Venegas presents with her son, Philip.    Her Parkinson's disease started at least 15 years ago. Her symptoms started with a tremor in her right second digit. She has been seeing Dr. Noel Figueroa at [facility]. They decided to transfer care after she movement after she moved to [facility] which is closer to this office. She last saw Dr. Figueroa 2023.    Her service agreement at [facility] is that medications have to be given within 2 hours of the scheduled time. They have talked about doing a pill box but family is concerned that she could fall and not be found until later. Family also states that she would do better if she is able to take her medication as soon as she gets up. Staff is trying to help her dress before she gets her medications. Family is wondering if she could have a pill and glass of water put at her bedside to take when she wakes in the morning.     [facility] has been an improvement over independent living where she was previously. She currently gets help with medications (administered four times a day) and two visits daily to help her dress. She also has a call necklace to get assistance if needed. All three meals a day are provided by her living facility.  "If she is not up to go downstairs for a meal, meals can be brought to her on request. Family keeps some food in her apartment when she wants a snack.     She has become more sensitive to medication timing. She is currently taking Carbidopa/Levodopa  mg tablets every four hours. If she doesn't get the medication within four hours - she will start to feel worse. She seems to be worse in the morning before her first medication doses.     Dr. Figueroa has indicated that he was hesitant to increase the number of tablets with each dose but that he would consider shortening the interval between doses.     On chart review, Dr. Figueroa has noted that she reported poor tolerance of increasing the dose per pill to 300 mg levodopa. Her son is wondering if this is because she was managing her own medications at that time and she wasn't able to manage that.     Philip is not aware of any side effects from the Carbidopa/Levodopa. Philip believes that Dr. Figueroa was concerned about inducing side effects without any increased benefit. Philip has not noticed any dyskinesias. She denies nausea with doses.     Someone from her facility wakes her at 1am every night to take her to the bathroom.     When she falls it is overnight - between 7pm and 7am. Once she falls she is not able to get up without full assistance.     Her son is wondering if she can get a medical bed.     Her mother and her cousin were diagnosed with Parkinson's disease - they are both . She has never undergone genetic testing for Parkinson's disease.       Parkinson Disease Motor Symptom Review:  Motor fluctuations: Overnight symptoms much worse  Dyskinesia: Denies  Wearing off:  Denies during the day. States that her legs are getting very stiff overnight.   Freezing of gait: Yes - this happens all the time although once she is moving she is pretty good.   Dystonia: Toe curling overnight \"when I'm trying to beat back a sheet that is twisted around my leg.\" " "People pile on sheets and blankets which just get in her way.   Tremor: Right hand rest tremor - not a noticeable difference with medications. No tremor elsewhere. Tremor goes away with use.   Rigidity: Legs get very stiff overnight. Also becomes stiff if she is sitting in a chair for quite a while  Bradykinesia: Generalized slowing. She does not use buttons - family has helped transition her wardrobe to cloths that can be pulled on as tasks that require dexterity are challenging. Handwriting is smaller than is used to be.      Parkinson's Disease Non-motor Symptom Review:  Depression - Rare feeling down or depressed - \"two days ago I could have  and I would have been happy.\" She attributes this to her Parkinson's symptoms having been particularly bad. She denies thoughts of wanting to harm herself. She has periods where she feels very hopeless, that nobody will come to help her when she is stuck. That feeling resolves when help comes and she is able to move.   Anxiety - Denies  Cognitive impairment -  On donepezil. Philip notes that cognition fluctuates. She struggles to remember things at time. Philip states that they don't think that donepezil has had a significant benefit.    Sleep disturbances - Ms. Venegas complains about her sleep a lot. Her tremor makes it hard for her to fall asleep but doesn't seem to wake her up once she falls asleep. Woken at 1am to go to the bathroom. Parkinson's symptoms get worse overnight - stiffness can be very bothersome at night. She denies punching, kicking, thrashing in her sleep. No dream enactment behavior.   GI symptoms - Denies constipation. Doesn't go more than one day without a bowel movement. She had serious hemorrhoids in the past. She has had bleeding which was hard to stop due to use of blood thinners. She has been prescribed suppositories.   Urinary symptoms - Urinary incontinence. This started maybe two years ago and has gotten worse. She uses depends constantly. She " states that her incontinence is due to urgency. Her son states that the muscle movements it takes to stand will cause her to lose control of her urine. Her son believes she has incontinence without realizing she needs to go. She has never seen a urologist.   Balance - Falls happen overnight - last fall was last week. She was sent to the hospital. She doesn't remember this. She was hospitalized for three days because of blood. Her son states that she never remembers why she falls. She states that she will fall when she tries to walk around the bed to sleep on the other side. Her steps are so small without her walker that she decides to sleep on the floor because she gets tired. She always uses a fall when she is using the walker. She has not had witnessed falls.   Pain - She has some knee pain - both replaced knee and regular knee.   Autonomic dysfunction - Denies dizziness or lightheadedness with palpitations  Hallucinations - Denies hallucinations, no shadows out of the corner of her eye.   Speech - Getting more difficult - more garbled. She has not worked with speech therapy except when she was at [facility]. That is not convenient for her. They would be open to speech therapy if they could come to her apartment. They have worked on getting home health care. Medicare denied home care services since she has been at [facility] because they didn't feel she was sufficiently homebound.   Swallowing - Worsening - coughing or choking occasionally. Not with every meal - maybe a couple times a week. More with liquids.   Salivation - None  Anosmia - Gone  Dopamine dysregulation  - No concerns      Driving: No  Living situation: [facility] in [place]  Medication adherence: Staff helps  ADL's: the patient is dependent on care for bathing, toileting, dressing and medications.       Related Medications 7am 11am 3pm 7pm    Carbidopa/Levodopa  mg 1 1 1     Carbidopa/Levodopa  mg CR    1    Donepezil 10 mg    1             Last taken at 11am    ROS:  All others negative except as listed above.    Past Medical History:   Diagnosis Date     Acne rosacea      Anemia 1989     Arthritis      Basal cell carcinoma      Cataract, unspecified cataract type, unspecified laterality      Cervicalgia      Degeneration of cervical intervertebral disc 06/1999    C3-C7     Hearing loss      Hyperlipidemia      Lumbago      Osteoarthrosis      Pain in right shoulder      Parkinson's disease      Resting tremor      Rhinitis, allergic      Right leg weakness      Varicose vein of leg         Past Surgical History:   Procedure Laterality Date     APPENDECTOMY  1964     COLONOSCOPY  10/31/2006     EYE SURGERY  10/05/2011    blepharoplasty     GYN SURGERY  1974    Ligate fallopian tube     HERNIA REPAIR  1964    Incisional hernia, reducible     ORTHOPEDIC SURGERY Right 2005    arthroscopy of joint shoulder     ORTHOPEDIC SURGERY Left 04/10/2017    knee arthroplasty     REVERSE ARTHROPLASTY SHOULDER Right 10/25/2018    Procedure: RIGHT REVERSE TOTAL SHOULDER ARTHROPLASTY;  Surgeon: Edd Currie MD;  Location: [facility] OR     SIGMOIDOSCOPY FLEXIBLE  10/2000        Current Outpatient Medications   Medication Sig Dispense Refill     Acetaminophen (TYLENOL PO) Take 1,000 mg by mouth 2 times daily And 1,000mg once daily as needed       acetaminophen (TYLENOL) 500 MG tablet Take 2 tablets (1,000 mg) by mouth 2 times daily. May also take 2 tablets (1,000 mg) daily as needed for mild pain or fever. 264 tablet 11     apixaban ANTICOAGULANT (ELIQUIS) 5 MG tablet Take 1 tablet (5 mg) by mouth 2 times daily 60 tablet 11     carbidopa-levodopa (SINEMET CR)  MG CR tablet Take 1 tablet by mouth at bedtime       carbidopa-levodopa (SINEMET)  MG per tablet Take 1 tablet by mouth 3 times daily       donepezil (ARICEPT) 10 MG tablet Take 1 tablet (10 mg) by mouth daily 30 tablet 11     Menthol, Topical Analgesic, (BIOFREEZE) 4 % GEL Externally apply  topically every 6 hours as needed         Allergies   Allergen Reactions     Other [Seasonal Allergies]      ragweed        Family History   Problem Relation Age of Onset     Neurologic Disorder Mother      Parkinsonism Mother      Dementia Mother      Prostate Cancer Father      Hypertension Father      Prostate Cancer Brother      Heart Disease Brother         Social History     Socioeconomic History     Marital status:      Spouse name: Not on file     Number of children: Not on file     Years of education: Not on file     Highest education level: Not on file   Occupational History     Not on file   Tobacco Use     Smoking status: Never     Smokeless tobacco: Never   Substance and Sexual Activity     Alcohol use: Yes     Comment: occasional     Drug use: No     Sexual activity: Not on file   Other Topics Concern     Parent/sibling w/ CABG, MI or angioplasty before 65F 55M? Not Asked   Social History Narrative     Not on file     Social Determinants of Health     Financial Resource Strain: Not on file   Food Insecurity: Not on file   Transportation Needs: Not on file   Physical Activity: Not on file   Stress: Not on file   Social Connections: Not on file   Interpersonal Safety: Not on file   Housing Stability: Not on file        PHYSICAL EXAM:  BP (!) 150/92   Pulse 99   SpO2 96%     Last dose at 11am.     Gen: alert, active, attentive, appropriately groomed   HEENT: normocephalic, eyes open with no discharge, nares patent, oropharynx clear-no lesions  Chest: normal configuration, chest rise equal b/l, non labored breathing   CV: regular rate and rhythm, no murmur, distal pulses palpable  Pulmonary: lungs are clear bilaterally, no wheezes/crackles/rales  Extremities: no clubbing/edema/cyanosis in BUE/BLE  Psych: mood stable     NEURO:  MS: Alert and oriented to person, place, time, and situation.  Speech normal to comprehension and naming.  Recent and remote memory intact.  Attention and concentration  normal.  Fund of knowledge normal.    CN:  II: Pupils equal, round, and reactive to light. VFF.  III, IV, VI: EOM normal range, no nystagmus.    V:  Facial sensation intact.  VII: Face symmetric at rest and with activation. Mild hypomimia.   VIII: Intact to finger rub bilaterally.  IX, X: Palate rise b/l, uvula midline.  No dysarthria. Mild hypophonia.  XI: Trapezius and SCM 5/5 bilaterally.  XII: Tongue protrudes midline. No fasciculation or atrophy noted.    Motor: Increased tone in BUE. Full details of motor testing per UPDRS below.     R/L  Shoulder abd      5/5  Elbow flex  5/5  Elbow ext  5/5     5/5  IO   5/5    Hip flex  5/5  Knee flex  5/5  Knee ext  5/5  Dorsiflex  5/5  Plantar flex  5/5    Reflexes:  R/L  No clonus.  No frontal release signs.    Sensation:  Intact to LT in all extremities.    Coordination:  FTN intact bilaterally.    Gait:  Ambulates with walker, short shuffling stride, en bloc turn.         1/29/2024     3:00 PM   UPDRS Motor Scale   Time: 15:27   Medication Off   R Brain DBS: None   L Brain DBS: None   Dyskinesia (LID) No   Did LID interfere No   Speech 1   Facial Expression 2   Rigidity Neck 1   Rigidity RUE 2   Rigidity LUE 1   Rigidity RLE 1   Rigidity LLE 0   Finger Taps R 3   Finger Taps L 2   Hand Mvt R 1   Hand Mvt L 1   Pron-/Supinate R 2   Pron-/Supinate L 2   Toe Tap R 0   Toe Tap L 1   Leg Agility R 0   Leg Agility L 0   Arise From Chair 3   Gait 3   Gait Freezing 0   Postural Stability 3   Posture 3   Global Spont Mvt 3   Postural Tremor RUE 1   Postural Tremor LUE 0   Kinetic Tremor RUE 0   Kinetic Tremor LUE 0   Rest Tremor RUE 2   Rest Tremor LUE 0   Rest Tremor RLE 0   Rest Tremor LLE 0   Rest Tremor Lip/Jaw 0   Rest Tremor Constancy 4   Total Right 12   Total Left 7   Axial Total 19   Total 42     LABS:    IMAGING:  MRI Brain with and without contrast November 2023  1. No evidence for acute infarct.   2. Small chronic cerebellar infarcts. Chronic lacunar infarcts  within the right gaby and anterior left thalamus.   3. Advanced chronic microvascular ischemic changes within the cerebral white matter.   4. Mild volume loss.   5. No abnormal intra-axial enhancement.   - Images not available for personal review      ASSESSMENT/PLAN:  Jennifer Venegas is a 83 year old right handed woman with Parkinson's disease, osteoarthritis, paroxysmal atrial fibrillation on apixaban, HLD and dementia who presents to establish care for Parkinson's disease. She has had Parkinson's disease for approximately 15 years and is currently living in assisted living. Ms. Venegas's most bothersome symptoms today is wearing off overnight. She denies significant wearing off during waking hours. In terms of nonmotor symptoms, she struggles with apathy and is limited in terms of her ability to do prior hobbies because of her Parkinson's disease. Her PHQ-9 (filled out by her son) was 13 today, but she and her son state that this is more due to apathy from Parkinson's disease. While she feels down at times, this is clearly related to OFF symptoms and physical discomfort. She has cognitive concerns that have not responded robustly to Donepezil.    Ms. Venegas's neurological exam is notable for asymmetric parkinsonism (R > L) with UPDRS 42. She was examined in the OFF state as she was due for a dose of Carbidopa/Levodopa shortly after the of our appointment and did not have it with her. In terms of Parkinson's treatment, Ms. Venegas would prefer to start by targeting OFF symptoms overnight. She is currently taking one tablet controlled release Carbidopa/Levodopa  mg as her last dose of the day. We will prescribe additional  mg Carbidopa/Levodopa CR to bring total controlled release Levodopa to 300 mg nightly. If this is tolerated, we can consider increasing this further to target nighttime symptoms. We will continue her daytime regimen as she has been prescribed.     She has had some issues with getting  medications on time at her new home. She and her family are currently working with her facility on ways to ensure that medications are given on time to prevent motor fluctuations. We agree that medication timing is very important. She will let us know if there is anything she needs from our office on that front. She would benefit from physical therapy and speech therapy. We have placed a referral to home health care for these therapy services as her mobility is very limited by her Parkinson's disease and she would be unable to attend outpatient therapy sessions.     - Add one tablet Carbidopa/Levodopa  mg CR at bedtime; Continue rest of Parkinson's regimen  Related Medications 7am 11am 3pm 7pm    Carbidopa/Levodopa  mg 1 1 1     Carbidopa/Levodopa  mg CR    1    Carbidopa//Levodopa  mg CR    1    Donepezil 10 mg    1      - Referral placed to home health care physical therapy and speech therapy  - Discussed PD GENEration project - free genetic testing. Consider given history of Parkinson's disease in her mother and cousin. Provided information in AVS  - Advised that we are happy to provide support for ensuring medications are provided on time to avoid motor fluctuations    Follow up in 3 months, 30 minutes    Depression Screening Follow-up        1/29/2024     1:56 PM   PHQ   PHQ-9 Total Score 13   Q9: Thoughts of better off dead/self-harm past 2 weeks Not at all     Her son reports he answered PHQ-9 questions for his mother. She struggles with lack of motivation and struggles to do new hobbies. She does not have a mental health provider. Theyy are not interested in a referral at this time.     Does the patient currently have a mental health provider?  No  Follow Up Actions Taken  Patient declined referral.       Time Spent: 80 minutes spent on the date of the encounter doing chart review, history and exam, documentation and further activities as noted above    Ms. Venegas was seen and  discussed with movement disorder attending, Dr. Garcia.     Tanvi Lindquist MD  Movement Disorders Fellow                Documentation of a Face-to-Face Physician Encounter January 29, 2024    Jennifer Venegas  1940  [id number]    I certify that this patient is under my care and that I, or a nurse practitioner or physician's assistant working with me, had a face-to-face encounter that meets the physician face-to-face encounter requirements with this patient on: 1/29/2024.    The encounter with the patient was in whole, or in part, for the following medical condition, which is the primary reason for home health care:  Patient Active Problem List   Diagnosis     Rotator cuff tear arthropathy, right     Senile dementia without behavioral disturbance (H)     Mixed hyperlipidemia     Osteoarthritis of spine with radiculopathy, lumbar region     Hearing loss     Parkinson's disease     Primary osteoarthritis involving multiple joints     Right leg numbness     Rosacea     Weakness generalized     Generalized pain     Paroxysmal atrial fibrillation (H)       I certify that, based on my findings, the following services are medically necessary home health services: Physical Therapy and Speech Language Therapy    My clinical findings support the need for the above services because: She has Parkinson's disease and requires physical therapy for gait training, home safety evaluation and exercise recommendations. She requires speech therapy for dysarthria and dysphagia related to Parkinson's disease    Further, I certify that my clinical findings support that this patient is homebound (i.e. absences from home require considerable and taxing effort and are for medical reasons or [employer] services or infrequently or of short duration when for other reasons) because: Parkinson's disease gait impairment.       Physician signature ___________________________________   January 29, 2024  Physician name: Tanvi Lindquist MD    Fax  ([phone]) or scan/email ([email or web address]) this completed document to [facility] within 24 hours of the referral date.  Questions: [phone].         I, Maciel Kaufman MD, personally interviewed, examined and evaluated this patient. I personally reviewed the vital signs, medications and pertinent labs/imaging. I discussed the patient with Dr Lindquist and agree with the assessment, examination and plan of care documented, with the additions and/or exceptions delineated below:    Patient with established diagnosis of PD, here to establish care. Previously seen by Dr Figueroa at [facility], however moved to an [facility] across the street from us, so due to logistical reasons she is here for transfer of care.    Overall stable, however some wearing off at night. Currently not undergoing rehab, as she's been waiting for a face-to-face assessment.    Exam did show clear parkinsonian signs, she was seen during her overdue time for her levodopa, so she was essentially off.    We will increase her overnight dose of sinemet and observe her symptoms and resume home health services. She is to continue to follow at the fellows clinic.       The longitudinal plan of care for Jennifer was addressed during this visit. Due to the added complexity in care, I will continue to support Jennifer Venegas in the subsequent management of this condition(s) and with the ongoing continuity of care of this condition(s).    Attending attestation: Please credit billing portion to Dr Lindquist, patient was seen in her continuity clinic.   Answers submitted by the patient for this visit:  Patient Health Questionnaire (Submitted on 1/29/2024)  If you checked off any problems, how difficult have these problems made it for you to do your work, take care of things at home, or get along with other people?: Extremely difficult  PHQ9 TOTAL SCORE: 13      Again, thank you for allowing me to participate in the care of your patient.         Sincerely,        Tanvi Lindquist MD

## 2024-01-29 NOTE — NURSING NOTE
"Jennifer Venegas is a 83 year old female who presents for:  Chief Complaint   Patient presents with    Referral     Establishing care -  parkinsons with dyskinesia        Initial Vitals:  BP (!) 150/92   Pulse 99   SpO2 96%  Estimated body mass index is 26.61 kg/m  as calculated from the following:    Height as of 1/24/24: 1.626 m (5' 4\").    Weight as of 1/24/24: 70.3 kg (155 lb).. There is no height or weight on file to calculate BSA. BP completed using cuff size: lauren Bennett    "

## 2024-01-29 NOTE — PROGRESS NOTES
I, Maciel Kaufman MD, personally interviewed, examined and evaluated this patient. I personally reviewed the vital signs, medications and pertinent labs/imaging. I discussed the patient with Dr Lindquist and agree with the assessment, examination and plan of care documented, with the additions and/or exceptions delineated below:    Patient with established diagnosis of PD, here to establish care. Previously seen by Dr Fiugeroa at [facility], however moved to an [facility] across the street from us, so due to logistical reasons she is here for transfer of care.    Overall stable, however some wearing off at night. Currently not undergoing rehab, as she's been waiting for a face-to-face assessment.    Exam did show clear parkinsonian signs, she was seen during her overdue time for her levodopa, so she was essentially off.    We will increase her overnight dose of sinemet and observe her symptoms and resume home health services. She is to continue to follow at the fellows clinic.       The longitudinal plan of care for Jennifer was addressed during this visit. Due to the added complexity in care, I will continue to support Jennifer Venegas in the subsequent management of this condition(s) and with the ongoing continuity of care of this condition(s).    Attending attestation: Please credit billing portion to Dr Lindquist, patient was seen in her continuity clinic.   Answers submitted by the patient for this visit:  Patient Health Questionnaire (Submitted on 1/29/2024)  If you checked off any problems, how difficult have these problems made it for you to do your work, take care of things at home, or get along with other people?: Extremely difficult  PHQ9 TOTAL SCORE: 13

## 2024-01-29 NOTE — PATIENT INSTRUCTIONS
If you are interested in genetic testing for Parkinson's disease, there is a program that provides free genetic testing for patients who are interested:   [email or web address]

## 2024-01-29 NOTE — PROGRESS NOTES
Department of Neurology  Movement Disorders Division   New Patient Visit    Patient: Jennifer Venegas   MRN: [id number]   : 1940   Date of Visit: 2024    CC: Parkinson's disease    HPI:  Jennifer Venegas is a 83 year old right handed woman with Parkinson's disease, osteoarthritis, paroxysmal atrial fibrillation on apixaban, HLD and dementia who presents to establish care for Parkinson's disease.     Ms. Venegas presents with her son, Philip.    Her Parkinson's disease started at least 15 years ago. Her symptoms started with a tremor in her right second digit. She has been [facility]. They decided to transfer care after she movement after she moved to [facility] which is closer to this office. She last saw Dr. Figueroa 2023.    Her service agreement at [facility] is that medications have to be given within 2 hours of the scheduled time. They have talked about doing a pill box but family is concerned that she could fall and not be found until later. Family also states that she would do better if she is able to take her medication as soon as she gets up. Staff is trying to help her dress before she gets her medications. Family is wondering if she could have a pill and glass of water put at her bedside to take when she wakes in the morning.     [facility] has been an improvement over independent living where she was previously. She currently gets help with medications (administered four times a day) and two visits daily to help her dress. She also has a call necklace to get assistance if needed. All three meals a day are provided by her living facility. If she is not up to go downstairs for a meal, meals can be brought to her on request. Family keeps some food in her apartment when she wants a snack.     She has become more sensitive to medication timing. She is currently taking Carbidopa/Levodopa  mg tablets every four hours. If she  "doesn't get the medication within four hours - she will start to feel worse. She seems to be worse in the morning before her first medication doses.     Dr. Figueroa has indicated that he was hesitant to increase the number of tablets with each dose but that he would consider shortening the interval between doses.     On chart review, Dr. Figueroa has noted that she reported poor tolerance of increasing the dose per pill to 300 mg levodopa. Her son is wondering if this is because she was managing her own medications at that time and she wasn't able to manage that.     Philip is not aware of any side effects from the Carbidopa/Levodopa. Phliip believes that Dr. Figueroa was concerned about inducing side effects without any increased benefit. Philip has not noticed any dyskinesias. She denies nausea with doses.     Someone from her facility wakes her at 1am every night to take her to the bathroom.     When she falls it is overnight - between 7pm and 7am. Once she falls she is not able to get up without full assistance.     Her son is wondering if she can get a medical bed.     Her mother and her cousin were diagnosed with Parkinson's disease - they are both . She has never undergone genetic testing for Parkinson's disease.       Parkinson Disease Motor Symptom Review:  Motor fluctuations: Overnight symptoms much worse  Dyskinesia: Denies  Wearing off:  Denies during the day. States that her legs are getting very stiff overnight.   Freezing of gait: Yes - this happens all the time although once she is moving she is pretty good.   Dystonia: Toe curling overnight \"when I'm trying to beat back a sheet that is twisted around my leg.\" People pile on sheets and blankets which just get in her way.   Tremor: Right hand rest tremor - not a noticeable difference with medications. No tremor elsewhere. Tremor goes away with use.   Rigidity: Legs get very stiff overnight. Also becomes stiff if she is sitting in a chair for quite a " "while  Bradykinesia: Generalized slowing. She does not use buttons - family has helped transition her wardrobe to cloths that can be pulled on as tasks that require dexterity are challenging. Handwriting is smaller than is used to be.      Parkinson's Disease Non-motor Symptom Review:  Depression - Rare feeling down or depressed - \"two days ago I could have  and I would have been happy.\" She attributes this to her Parkinson's symptoms having been particularly bad. She denies thoughts of wanting to harm herself. She has periods where she feels very hopeless, that nobody will come to help her when she is stuck. That feeling resolves when help comes and she is able to move.   Anxiety - Denies  Cognitive impairment -  On donepezil. Philip notes that cognition fluctuates. She struggles to remember things at time. Philip states that they don't think that donepezil has had a significant benefit.    Sleep disturbances - Ms. Venegas complains about her sleep a lot. Her tremor makes it hard for her to fall asleep but doesn't seem to wake her up once she falls asleep. Woken at 1am to go to the bathroom. Parkinson's symptoms get worse overnight - stiffness can be very bothersome at night. She denies punching, kicking, thrashing in her sleep. No dream enactment behavior.   GI symptoms - Denies constipation. Doesn't go more than one day without a bowel movement. She had serious hemorrhoids in the past. She has had bleeding which was hard to stop due to use of blood thinners. She has been prescribed suppositories.   Urinary symptoms - Urinary incontinence. This started maybe two years ago and has gotten worse. She uses depends constantly. She states that her incontinence is due to urgency. Her son states that the muscle movements it takes to stand will cause her to lose control of her urine. Her son believes she has incontinence without realizing she needs to go. She has never seen a urologist.   Balance - Falls happen overnight - " last fall was last week. She was sent to the hospital. She doesn't remember this. She was hospitalized for three days because of blood. Her son states that she never remembers why she falls. She states that she will fall when she tries to walk around the bed to sleep on the other side. Her steps are so small without her walker that she decides to sleep on the floor because she gets tired. She always uses a fall when she is using the walker. She has not had witnessed falls.   Pain - She has some knee pain - both replaced knee and regular knee.   Autonomic dysfunction - Denies dizziness or lightheadedness with palpitations  Hallucinations - Denies hallucinations, no shadows out of the corner of her eye.   Speech - Getting more difficult - more garbled. She has not worked with speech therapy except when she was at [facility]. That is not convenient for her. They would be open to speech therapy if they could come to her apartment. They have worked on getting home health care. Medicare denied home care services since she has been at [facility] because they didn't feel she was sufficiently homebound.   Swallowing - Worsening - coughing or choking occasionally. Not with every meal - maybe a couple times a week. More with liquids.   Salivation - None  Anosmia - Gone  Dopamine dysregulation  - No concerns      Driving: No  Living situation: [facility] in [place]  Medication adherence: Staff helps  ADL's: the patient is dependent on care for bathing, toileting, dressing and medications.       Related Medications 7am 11am 3pm 7pm    Carbidopa/Levodopa  mg 1 1 1     Carbidopa/Levodopa  mg CR    1    Donepezil 10 mg    1            Last taken at 11am    ROS:  All others negative except as listed above.    Past Medical History:   Diagnosis Date    Acne rosacea     Anemia 1989    Arthritis     Basal cell carcinoma     Cataract, unspecified cataract type, unspecified laterality     Cervicalgia     Degeneration of  cervical intervertebral disc 06/1999    C3-C7    Hearing loss     Hyperlipidemia     Lumbago     Osteoarthrosis     Pain in right shoulder     Parkinson's disease     Resting tremor     Rhinitis, allergic     Right leg weakness     Varicose vein of leg         Past Surgical History:   Procedure Laterality Date    APPENDECTOMY  1964    COLONOSCOPY  10/31/2006    EYE SURGERY  10/05/2011    blepharoplasty    GYN SURGERY  1974    Ligate fallopian tube    HERNIA REPAIR  1964    Incisional hernia, reducible    ORTHOPEDIC SURGERY Right 2005    arthroscopy of joint shoulder    ORTHOPEDIC SURGERY Left 04/10/2017    knee arthroplasty    REVERSE ARTHROPLASTY SHOULDER Right 10/25/2018    Procedure: RIGHT REVERSE TOTAL SHOULDER ARTHROPLASTY;  Surgeon: Edd Currie MD;  Location: [facility] OR    SIGMOIDOSCOPY FLEXIBLE  10/2000        Current Outpatient Medications   Medication Sig Dispense Refill    Acetaminophen (TYLENOL PO) Take 1,000 mg by mouth 2 times daily And 1,000mg once daily as needed      acetaminophen (TYLENOL) 500 MG tablet Take 2 tablets (1,000 mg) by mouth 2 times daily. May also take 2 tablets (1,000 mg) daily as needed for mild pain or fever. 264 tablet 11    apixaban ANTICOAGULANT (ELIQUIS) 5 MG tablet Take 1 tablet (5 mg) by mouth 2 times daily 60 tablet 11    carbidopa-levodopa (SINEMET CR)  MG CR tablet Take 1 tablet by mouth at bedtime      carbidopa-levodopa (SINEMET)  MG per tablet Take 1 tablet by mouth 3 times daily      donepezil (ARICEPT) 10 MG tablet Take 1 tablet (10 mg) by mouth daily 30 tablet 11    Menthol, Topical Analgesic, (BIOFREEZE) 4 % GEL Externally apply topically every 6 hours as needed         Allergies   Allergen Reactions    Other [Seasonal Allergies]      ragweed        Family History   Problem Relation Age of Onset    Neurologic Disorder Mother     Parkinsonism Mother     Dementia Mother     Prostate Cancer Father     Hypertension Father     Prostate Cancer Brother      Heart Disease Brother         Social History     Socioeconomic History    Marital status:      Spouse name: Not on file    Number of children: Not on file    Years of education: Not on file    Highest education level: Not on file   Occupational History    Not on file   Tobacco Use    Smoking status: Never    Smokeless tobacco: Never   Substance and Sexual Activity    Alcohol use: Yes     Comment: occasional    Drug use: No    Sexual activity: Not on file   Other Topics Concern    Parent/sibling w/ CABG, MI or angioplasty before 65F 55M? Not Asked   Social History Narrative    Not on file     Social Determinants of Health     Financial Resource Strain: Not on file   Food Insecurity: Not on file   Transportation Needs: Not on file   Physical Activity: Not on file   Stress: Not on file   Social Connections: Not on file   Interpersonal Safety: Not on file   Housing Stability: Not on file        PHYSICAL EXAM:  BP (!) 150/92   Pulse 99   SpO2 96%     Last dose at 11am.     Gen: alert, active, attentive, appropriately groomed   HEENT: normocephalic, eyes open with no discharge, nares patent, oropharynx clear-no lesions  Chest: normal configuration, chest rise equal b/l, non labored breathing   CV: regular rate and rhythm, no murmur, distal pulses palpable  Pulmonary: lungs are clear bilaterally, no wheezes/crackles/rales  Extremities: no clubbing/edema/cyanosis in BUE/BLE  Psych: mood stable     NEURO:  MS: Alert and oriented to person, place, time, and situation.  Speech normal to comprehension and naming.  Recent and remote memory intact.  Attention and concentration normal.  Fund of knowledge normal.    CN:  II: Pupils equal, round, and reactive to light. VFF.  III, IV, VI: EOM normal range, no nystagmus.    V:  Facial sensation intact.  VII: Face symmetric at rest and with activation. Mild hypomimia.   VIII: Intact to finger rub bilaterally.  IX, X: Palate rise b/l, uvula midline.  No dysarthria. Mild  hypophonia.  XI: Trapezius and SCM 5/5 bilaterally.  XII: Tongue protrudes midline. No fasciculation or atrophy noted.    Motor: Increased tone in BUE. Full details of motor testing per UPDRS below.     R/L  Shoulder abd      5/5  Elbow flex  5/5  Elbow ext  5/5     5/5  IO   5/5    Hip flex  5/5  Knee flex  5/5  Knee ext  5/5  Dorsiflex  5/5  Plantar flex  5/5    Reflexes:  R/L  No clonus.  No frontal release signs.    Sensation:  Intact to LT in all extremities.    Coordination:  FTN intact bilaterally.    Gait:  Ambulates with walker, short shuffling stride, en bloc turn.         1/29/2024     3:00 PM   UPDRS Motor Scale   Time: 15:27   Medication Off   R Brain DBS: None   L Brain DBS: None   Dyskinesia (LID) No   Did LID interfere No   Speech 1   Facial Expression 2   Rigidity Neck 1   Rigidity RUE 2   Rigidity LUE 1   Rigidity RLE 1   Rigidity LLE 0   Finger Taps R 3   Finger Taps L 2   Hand Mvt R 1   Hand Mvt L 1   Pron-/Supinate R 2   Pron-/Supinate L 2   Toe Tap R 0   Toe Tap L 1   Leg Agility R 0   Leg Agility L 0   Arise From Chair 3   Gait 3   Gait Freezing 0   Postural Stability 3   Posture 3   Global Spont Mvt 3   Postural Tremor RUE 1   Postural Tremor LUE 0   Kinetic Tremor RUE 0   Kinetic Tremor LUE 0   Rest Tremor RUE 2   Rest Tremor LUE 0   Rest Tremor RLE 0   Rest Tremor LLE 0   Rest Tremor Lip/Jaw 0   Rest Tremor Constancy 4   Total Right 12   Total Left 7   Axial Total 19   Total 42     LABS:    IMAGING:  MRI Brain with and without contrast November 2023  1. No evidence for acute infarct.   2. Small chronic cerebellar infarcts. Chronic lacunar infarcts within the right gaby and anterior left thalamus.   3. Advanced chronic microvascular ischemic changes within the cerebral white matter.   4. Mild volume loss.   5. No abnormal intra-axial enhancement.   - Images not available for personal review      ASSESSMENT/PLAN:  Jennifer Venegas is a 83 year old right handed woman with Parkinson's  disease, osteoarthritis, paroxysmal atrial fibrillation on apixaban, HLD and dementia who presents to establish care for Parkinson's disease. She has had Parkinson's disease for approximately 15 years and is currently living in assisted living. Ms. Venegas's most bothersome symptoms today is wearing off overnight. She denies significant wearing off during waking hours. In terms of nonmotor symptoms, she struggles with apathy and is limited in terms of her ability to do prior hobbies because of her Parkinson's disease. Her PHQ-9 (filled out by her son) was 13 today, but she and her son state that this is more due to apathy from Parkinson's disease. While she feels down at times, this is clearly related to OFF symptoms and physical discomfort. She has cognitive concerns that have not responded robustly to Donepezil.    Ms. Venegas's neurological exam is notable for asymmetric parkinsonism (R > L) with UPDRS 42. She was examined in the OFF state as she was due for a dose of Carbidopa/Levodopa shortly after the of our appointment and did not have it with her. In terms of Parkinson's treatment, Ms. Venegas would prefer to start by targeting OFF symptoms overnight. She is currently taking one tablet controlled release Carbidopa/Levodopa  mg as her last dose of the day. We will prescribe additional  mg Carbidopa/Levodopa CR to bring total controlled release Levodopa to 300 mg nightly. If this is tolerated, we can consider increasing this further to target nighttime symptoms. We will continue her daytime regimen as she has been prescribed.     She has had some issues with getting medications on time at her new home. She and her family are currently working with her facility on ways to ensure that medications are given on time to prevent motor fluctuations. We agree that medication timing is very important. She will let us know if there is anything she needs from our office on that front. She would benefit from  physical therapy and speech therapy. We have placed a referral to home health care for these therapy services as her mobility is very limited by her Parkinson's disease and she would be unable to attend outpatient therapy sessions.     - Add one tablet Carbidopa/Levodopa  mg CR at bedtime; Continue rest of Parkinson's regimen  Related Medications 7am 11am 3pm 7pm    Carbidopa/Levodopa  mg 1 1 1     Carbidopa/Levodopa  mg CR    1    Carbidopa//Levodopa  mg CR    1    Donepezil 10 mg    1      - Referral placed to home health care physical therapy and speech therapy  - Discussed PD GENEration project - free genetic testing. Consider given history of Parkinson's disease in her mother and cousin. Provided information in AVS  - Advised that we are happy to provide support for ensuring medications are provided on time to avoid motor fluctuations    Follow up in 3 months, 30 minutes    Depression Screening Follow-up        1/29/2024     1:56 PM   PHQ   PHQ-9 Total Score 13   Q9: Thoughts of better off dead/self-harm past 2 weeks Not at all     Her son reports he answered PHQ-9 questions for his mother. She struggles with lack of motivation and struggles to do new hobbies. She does not have a mental health provider. Theyy are not interested in a referral at this time.     Does the patient currently have a mental health provider?  No  Follow Up Actions Taken  Patient declined referral.       Time Spent: 80 minutes spent on the date of the encounter doing chart review, history and exam, documentation and further activities as noted above    Ms. Venegas was seen and discussed with movement disorder attending, Dr. Garcia.     Tanvi Lindquist MD  Movement Disorders Fellow                Documentation of a Face-to-Face Physician Encounter January 29, 2024    Jennifer Venegas  1940  [id number]    I certify that this patient is under my care and that I, or a nurse practitioner or physician's assistant  working with me, had a face-to-face encounter that meets the physician face-to-face encounter requirements with this patient on: 1/29/2024.    The encounter with the patient was in whole, or in part, for the following medical condition, which is the primary reason for home health care:  Patient Active Problem List   Diagnosis    Rotator cuff tear arthropathy, right    Senile dementia without behavioral disturbance (H)    Mixed hyperlipidemia    Osteoarthritis of spine with radiculopathy, lumbar region    Hearing loss    Parkinson's disease    Primary osteoarthritis involving multiple joints    Right leg numbness    Rosacea    Weakness generalized    Generalized pain    Paroxysmal atrial fibrillation (H)       I certify that, based on my findings, the following services are medically necessary home health services: Physical Therapy and Speech Language Therapy    My clinical findings support the need for the above services because: She has Parkinson's disease and requires physical therapy for gait training, home safety evaluation and exercise recommendations. She requires speech therapy for dysarthria and dysphagia related to Parkinson's disease    Further, I certify that my clinical findings support that this patient is homebound (i.e. absences from home require considerable and taxing effort and are for medical reasons or Yaz[employer] services or infrequently or of short duration when for other reasons) because: Parkinson's disease gait impairment.       Physician signature ___________________________________   January 29, 2024  Physician name: Tanvi Lindquist MD    Fax ([phone]) or scan/email ([email or web address]) this completed document to [facility] [employer] [facility] within 24 hours of the referral date.  Questions: [phone].

## 2024-01-31 NOTE — LETTER
1/31/2024        RE: Jennifer Venegas  C/o Jana Lin   [address] [place] [address]        [facility]  [facility] Record Number:  [id number]  Place of Service where encounter took place:  [facility] [[id number]]  Chief Complaint   Patient presents with     RECHECK     Emergency Department follow up       HPI:    Jennifer Venegas  is a 83 year old (1940), who is being seen today for an episodic care visit.  HPI information obtained from: facility chart records, facility staff, patient report, and [facility] chart review. Today's concern is:    Follow up to hospitalization 1/22-1/24 for external hemorrhoids bleeding, weakness, and fall. She was started on fibercon daily and Anucort-HC supp twice daily as needed to prevent constipation but don't see the supp on facility medication list. Her Donepezil was also increased to 10 mg po daily.     Denies pain today. Bowel movement was yesterday but it was hard. Tried to have a bowel movement today but was not successful. BP is labile. Recheck during visit is /80 HR 84.    Reviewed with her son today. It's been a hard transition to [place] for her. Therapy was delayed with family trip so will reorder today.    Past Medical and Surgical History reviewed in Epic today.    MEDICATIONS:    Current Outpatient Medications   Medication Sig Dispense Refill     carbidopa-levodopa (SINEMET CR)  MG CR tablet Take 1 tablet by mouth at bedtime At 7pm       carbidopa-levodopa (SINEMET CR)  MG CR tablet Take 1 tablet by mouth at bedtime       SENNA-docusate sodium (SENNA S) 8.6-50 MG tablet Take 1 tablet by mouth at bedtime May have 1 tablet po BID PRN 30 tablet 3     acetaminophen (TYLENOL) 500 MG tablet Take 2 tablets (1,000 mg) by mouth 2 times daily. May also take 2 tablets (1,000 mg) daily as needed for mild pain or fever. 264 tablet 11     apixaban ANTICOAGULANT (ELIQUIS) 5 MG  "tablet Take 1 tablet (5 mg) by mouth 2 times daily 60 tablet 11     Calcium Polycarbophil (FIBER) 625 MG tablet Take 1,250 mg by mouth daily       carbidopa-levodopa (SINEMET)  MG tablet Take 1 tablet by mouth 3 times daily       donepezil (ARICEPT) 10 MG tablet Take 1 tablet (10 mg) by mouth daily 30 tablet 11     REVIEW OF SYSTEMS:  4 point ROS including Respiratory, CV, GI and , other than that noted in the HPI,  is negative    Objective:  BP (!) 150/92   Pulse 99   Ht 1.626 m (5' 4\")   Wt 70.3 kg (155 lb)   SpO2 96%   BMI 26.61 kg/m    Exam:  GENERAL APPEARANCE:  Alert, in no distress  ENT:  Mouth and posterior oropharynx normal, moist mucous membranes  EYES:  EOM, conjunctivae, lids, pupils and irises normal  NECK:  no adenopathy  RESP:  lungs clear to auscultation , diminished breath sounds throughout  CV:  Palpation and auscultation of heart done , regular rate and irregular rhythm, no murmur, rub, or gallop, trace edema NOW R>L 2+ pitting to ankles   ABDOMEN:  bowel sounds normal  M/S:   ambulates with 4WW  SKIN:  dry skin, bruising to shins   NEURO:   Cranial nerves 2-12 are normal tested and grossly at patient's baseline  PSYCH:  insight and judgement impaired, memory impaired     Labs:   Reviewed in Care Everywhere    ASSESSMENT/PLAN:  (K59.01) Slow transit constipation  Comment: still with bowel movement difficultly  Plan:  -Senna S added today    (F03.90) Senile dementia without behavioral disturbance (H)  (primary encounter diagnosis)  Comment: moved in [place]  Plan:   -staff assist with meds and ADLs  -Donepezil 10 mg po daily     (I48.0) Paroxysmal atrial fibrillation (H)  Comment: noted in TCU at [facility]. NSR with recent EKG  Plan:   -Zio patch once returning from holiday [place]  -continue Eliquis 5 mg po BID for now     (G20.B2) Parkinson's disease with dyskinesia and fluctuating manifestations  (M47.26) Osteoarthritis of spine with radiculopathy, lumbar region  (R26.81) Gait " instability  Comment: ongoing   Plan:   -PT/OT to eval and treat  -continue current medications        Electronically signed by:  NAREN Rothman CNP               Sincerely,        NAREN Rothman CNP

## 2024-01-31 NOTE — LETTER
Jennifer Venegas orders:    Begin   SENNA-docusate sodium (SENNA S) 8.6-50 MG tablet Take 1 tablet by mouth at bedtime May have 1 tablet po BID PRN for constipation        NAREN Rothman CNP on 1/31/2024 at 2:23 PM

## 2024-01-31 NOTE — PROGRESS NOTES
[facility]  [place] Medical Record Number:  [id number]  Place of Service where encounter took place:  [facility] [[id number]]  Chief Complaint   Patient presents with    RECHECK     Emergency Department follow up       HPI:    Jennifer Venegas  is a 83 year old (1940), who is being seen today for an episodic care visit.  HPI information obtained from: facility chart records, facility staff, patient report, and [facility] chart review. Today's concern is:    Follow up to hospitalization 1/22-1/24 for external hemorrhoids bleeding, weakness, and fall. She was started on fibercon daily and Anucort-HC supp twice daily as needed to prevent constipation but don't see the supp on facility medication list. Her Donepezil was also increased to 10 mg po daily.     Denies pain today. Bowel movement was yesterday but it was hard. Tried to have a bowel movement today but was not successful. BP is labile. Recheck during visit is /80 HR 84.    Reviewed with her son today. It's been a hard transition to [place] for her. Therapy was delayed with family trip so will reorder today.    Past Medical and Surgical History reviewed in Epic today.    MEDICATIONS:    Current Outpatient Medications   Medication Sig Dispense Refill    carbidopa-levodopa (SINEMET CR)  MG CR tablet Take 1 tablet by mouth at bedtime At 7pm      carbidopa-levodopa (SINEMET CR)  MG CR tablet Take 1 tablet by mouth at bedtime      SENNA-docusate sodium (SENNA S) 8.6-50 MG tablet Take 1 tablet by mouth at bedtime May have 1 tablet po BID PRN 30 tablet 3    acetaminophen (TYLENOL) 500 MG tablet Take 2 tablets (1,000 mg) by mouth 2 times daily. May also take 2 tablets (1,000 mg) daily as needed for mild pain or fever. 264 tablet 11    apixaban ANTICOAGULANT (ELIQUIS) 5 MG tablet Take 1 tablet (5 mg) by mouth 2 times daily 60 tablet 11    Calcium Polycarbophil (FIBER) 625 MG tablet Take 1,250 mg by  "mouth daily      carbidopa-levodopa (SINEMET)  MG tablet Take 1 tablet by mouth 3 times daily      donepezil (ARICEPT) 10 MG tablet Take 1 tablet (10 mg) by mouth daily 30 tablet 11     REVIEW OF SYSTEMS:  4 point ROS including Respiratory, CV, GI and , other than that noted in the HPI,  is negative    Objective:  BP (!) 150/92   Pulse 99   Ht 1.626 m (5' 4\")   Wt 70.3 kg (155 lb)   SpO2 96%   BMI 26.61 kg/m    Exam:  GENERAL APPEARANCE:  Alert, in no distress  ENT:  Mouth and posterior oropharynx normal, moist mucous membranes  EYES:  EOM, conjunctivae, lids, pupils and irises normal  NECK:  no adenopathy  RESP:  lungs clear to auscultation , diminished breath sounds throughout  CV:  Palpation and auscultation of heart done , regular rate and irregular rhythm, no murmur, rub, or gallop, trace edema NOW R>L 2+ pitting to ankles   ABDOMEN:  bowel sounds normal  M/S:   ambulates with 4WW  SKIN:  dry skin, bruising to shins   NEURO:   Cranial nerves 2-12 are normal tested and grossly at patient's baseline  PSYCH:  insight and judgement impaired, memory impaired     Labs:   Reviewed in Care Everywhere    ASSESSMENT/PLAN:  (K59.01) Slow transit constipation  Comment: still with bowel movement difficultly  Plan:  -Senna S added today    (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 NSR with recent EKG  Plan:   -Zio patch once returning from holiday [place]  -continue Eliquis 5 mg po BID for now     (G20.B2) Parkinson's disease with dyskinesia and fluctuating manifestations  (M47.26) Osteoarthritis of spine with radiculopathy, lumbar region  (R26.81) Gait instability  Comment: ongoing   Plan:   -PT/OT to eval and treat  -continue current medications        Electronically signed by:  Sean TOWNSEND" NAREN Singh CNP

## 2024-02-13 NOTE — PROGRESS NOTES
"[facility]  [facility] Medical Record Number:  [id number]  Place of Service where encounter took place:  [facility] [[id number]]  Chief Complaint   Patient presents with    RECHECK     Cerumen impaction       HPI:    Jennifer Venegas  is a 83 year old (1940), who is being seen today for an episodic care visit.  HPI information obtained from: patient report. Today's concern is:    Wanted to have her ears cleaned. Debrox was ordered but not started. Continues to work with PT/OT. ZIO patch in place. Senna S started 2/1/24. Says bowel working ok.     Past Medical and Surgical History reviewed in Epic today.    MEDICATIONS:    Current Outpatient Medications   Medication Sig Dispense Refill    acetaminophen (TYLENOL) 500 MG tablet Take 2 tablets (1,000 mg) by mouth 2 times daily. May also take 2 tablets (1,000 mg) daily as needed for mild pain or fever. 264 tablet 11    apixaban ANTICOAGULANT (ELIQUIS) 5 MG tablet Take 1 tablet (5 mg) by mouth 2 times daily 60 tablet 11    Calcium Polycarbophil (FIBER) 625 MG tablet Take 1,250 mg by mouth daily      carbidopa-levodopa (SINEMET CR)  MG CR tablet Take 1 tablet by mouth at bedtime At 7pm      carbidopa-levodopa (SINEMET CR)  MG CR tablet Take 1 tablet by mouth at bedtime      carbidopa-levodopa (SINEMET)  MG tablet Take 1 tablet by mouth 3 times daily      donepezil (ARICEPT) 10 MG tablet Take 1 tablet (10 mg) by mouth daily 30 tablet 11    SENNA-docusate sodium (SENNA S) 8.6-50 MG tablet Take 1 tablet by mouth at bedtime May have 1 tablet po BID PRN 30 tablet 3     REVIEW OF SYSTEMS:  Limited secondary to cognitive impairment but today pt reports ok    Objective:  /82   Pulse 96   Resp 16   Ht 1.626 m (5' 4\")   Wt 70.3 kg (155 lb)   SpO2 95%   BMI 26.61 kg/m    Exam:  GENERAL APPEARANCE:  Alert, in no distress  ENT:  Mouth and posterior oropharynx normal, moist mucous membranes, bilateral " cerumen impaction see with otoscope    EYES:  EOM, conjunctivae, lids, pupils and irises normal  RESP:  lungs clear to auscultation , diminished breath sounds throughout  CV:  Palpation and auscultation of heart done , regular rate and irregular rhythm, no murmur, rub, or gallop, trace edema NOW R>L 2+ pitting to ankles   ABDOMEN:  bowel sounds normal  M/S:   ambulates with 4WW  SKIN:  dry skin, bruising to shins   NEURO:   Cranial nerves 2-12 are normal tested and grossly at patient's baseline  PSYCH:  insight and judgement impaired, memory impaired     Labs:   Reviewed in Care Everywhere     ASSESSMENT/PLAN:  (H61.23) Bilateral impacted cerumen  (primary encounter diagnosis)  Comment:   Plan:   -cerumen removal with lighted curette to both ear canals       -updated weight requested     Electronically signed by:  NAREN Rothman CNP

## 2024-02-14 NOTE — LETTER
"    2/14/2024        RE: Jennifer Venegas  C/o Jana Lin  [address] [place] [address]        [facility]  [place] [facility] Record Number:  [id number]  Place of Service where encounter took place:  [facility] [[id number]]  Chief Complaint   Patient presents with     RECHECK     Cerumen impaction       HPI:    Jennifer Venegas  is a 83 year old (1940), who is being seen today for an episodic care visit.  HPI information obtained from: patient report. Today's concern is:    Wanted to have her ears cleaned. Debrox was ordered but not started. Continues to work with PT/OT. ZIO patch in place. Senna S started 2/1/24. Says bowel working ok.     Past Medical and Surgical History reviewed in Epic today.    MEDICATIONS:    Current Outpatient Medications   Medication Sig Dispense Refill     acetaminophen (TYLENOL) 500 MG tablet Take 2 tablets (1,000 mg) by mouth 2 times daily. May also take 2 tablets (1,000 mg) daily as needed for mild pain or fever. 264 tablet 11     apixaban ANTICOAGULANT (ELIQUIS) 5 MG tablet Take 1 tablet (5 mg) by mouth 2 times daily 60 tablet 11     Calcium Polycarbophil (FIBER) 625 MG tablet Take 1,250 mg by mouth daily       carbidopa-levodopa (SINEMET CR)  MG CR tablet Take 1 tablet by mouth at bedtime At 7pm       carbidopa-levodopa (SINEMET CR)  MG CR tablet Take 1 tablet by mouth at bedtime       carbidopa-levodopa (SINEMET)  MG tablet Take 1 tablet by mouth 3 times daily       donepezil (ARICEPT) 10 MG tablet Take 1 tablet (10 mg) by mouth daily 30 tablet 11     SENNA-docusate sodium (SENNA S) 8.6-50 MG tablet Take 1 tablet by mouth at bedtime May have 1 tablet po BID PRN 30 tablet 3     REVIEW OF SYSTEMS:  Limited secondary to cognitive impairment but today pt reports ok    Objective:  /82   Pulse 96   Resp 16   Ht 1.626 m (5' 4\")   Wt 70.3 kg (155 lb)   SpO2 95%   BMI 26.61 kg/m  "   Exam:  GENERAL APPEARANCE:  Alert, in no distress  ENT:  Mouth and posterior oropharynx normal, moist mucous membranes, bilateral cerumen impaction see with otoscope    EYES:  EOM, conjunctivae, lids, pupils and irises normal  RESP:  lungs clear to auscultation , diminished breath sounds throughout  CV:  Palpation and auscultation of heart done , regular rate and irregular rhythm, no murmur, rub, or gallop, trace edema NOW R>L 2+ pitting to ankles   ABDOMEN:  bowel sounds normal  M/S:   ambulates with 4WW  SKIN:  dry skin, bruising to shins   NEURO:   Cranial nerves 2-12 are normal tested and grossly at patient's baseline  PSYCH:  insight and judgement impaired, memory impaired     Labs:   Reviewed in Care Everywhere     ASSESSMENT/PLAN:  (H61.23) Bilateral impacted cerumen  (primary encounter diagnosis)  Comment:   Plan:   -cerumen removal with lighted curette to both ear canals       -updated weight requested     Electronically signed by:  NAREN Rothman CNP             Sincerely,        NAREN Rothman CNP

## 2024-03-06 NOTE — PROGRESS NOTES
"Jennifer Venegas is a 84 year old female seen March 6, 2024 at [facility] where she has resided for 3 months (admit 12/2023) seen for initial visit.  Pt is seen in her apartment up to chair, ambulates with 4WW    Feels she is doing well, notes \"I don't have any memory trouble\"   Goes to  or downstairs 4x/day and ambulates in the hallways with her 4WW    \"Knees are kind of achy\"   Reports she is eating /sleeping well, likes her new place.       By chart review, pt is followed by [facility] Dr Figueroa for Parkinson's disease, seen in December.   Right hand tremor started in 2010.   Developed cognitive symptoms, started on donepezil      Pt had a November 2023 hospitalization at [facility] after a fall at home resulting in traumatic rhabdomyolysis.   Dx'd with progressive dementia and FTT, needing higher level of care.   She discharged to TCU before transitioning to [place]     Pt hospitalized at [facility] again in January 2024 for rectal bleeding attributed to external hemorrhoids in the setting of anticoagulation with apixaban for PAF.   Hgb stable at 13     Returned to [place]      Past Medical History:   Diagnosis Date    Acne rosacea     Anemia 1989    Arthritis     Basal cell carcinoma     Cataract, unspecified cataract type, unspecified laterality     Cervicalgia     Degeneration of cervical intervertebral disc 06/1999    C3-C7    Hearing loss     Hyperlipidemia     Lumbago     Osteoarthrosis     Pain in right shoulder     Parkinson's disease     Resting tremor     Rhinitis, allergic     Right leg weakness     Varicose vein of leg        Past Surgical History:   Procedure Laterality Date    APPENDECTOMY  1964    COLONOSCOPY  10/31/2006    EYE SURGERY  10/05/2011    blepharoplasty    GYN SURGERY  1974    Ligate fallopian tube    HERNIA REPAIR  1964    Incisional hernia, reducible    ORTHOPEDIC SURGERY Right 2005    arthroscopy of joint shoulder    ORTHOPEDIC SURGERY Left 04/10/2017    knee arthroplasty    REVERSE " "ARTHROPLASTY SHOULDER Right 10/25/2018    Procedure: RIGHT REVERSE TOTAL SHOULDER ARTHROPLASTY;  Surgeon: Edd Currie MD;  Location: [facility] OR    SIGMOIDOSCOPY FLEXIBLE  10/2000      SH:  Lived in [facility] for 18 years, moved to [place] in       [place] for 3 years   Son Philip in [place]    Son in [place], is an   Daughter in [place], is a teacher   Sold her car  Non smoker    ROS:  ambulatory with 4ww  Wt Readings from Last 5 Encounters:   24 73.9 kg (163 lb)   24 70.3 kg (155 lb)   24 70.3 kg (155 lb)   24 70.3 kg (155 lb)   23 70.3 kg (155 lb)      EXAM: NAD  /66   Pulse 83   Temp 97.7  F (36.5  C)   Resp 20   Ht 1.626 m (5' 4\")   Wt 73.9 kg (163 lb)   SpO2 97%   BMI 27.98 kg/m     Neck supple without adenopathy  Lungs clear bilaterally with fair air movement   Heart RRR s1s2   Abd soft, NT, no distention or guarding, +BS  Ext without edema   Neuro: minimal hand tremor, ambulatory with walker   Lacks insight, +STML   Psych:  affect okay, pleasant and conversational    LABS reviewed: on  BMP within normal limits   Cr 0.65   GFR >60  Hgb 13.2  stable     MR Brain W/WO IV Cont   2023    FINDINGS:  No evidence for acute infarct. Small chronic cerebellar infarcts. Chronic lacunar infarct within the gaby just to the right of midline. Patchy confluent zones of T2 and FLAIR signal hyperintensity within the cerebral white matter. Small chronic lacunar infarct anterior left thalamus. Multiple scattered punctate foci of hypointense signal on the axial T2*images within the cerebellum and bilateral cerebral hemispheres including the right thalamus are nonspecific but may represent small foci of hemosiderin deposition or calcification. Mild volume loss. No abnormal intra-axial enhancement. Postoperative changes involving the optic lens.  IMPRESSION:    1. No evidence for acute infarct.  2. Small chronic cerebellar infarcts. Chronic lacunar infarcts " within the right gaby and anterior left thalamus.  3. Advanced chronic microvascular ischemic changes within the cerebral white matter.  4. Mild volume loss.  5. No abnormal intra-axial enhancement.      IMP/PLAN:  (G20.B2) Parkinson's disease with dyskinesia and fluctuating manifestations    Comment: longstanding, followed by Neurology   Plan: Sinemet 25/250 tid, 25/100 at HS, Sinemet ER 50/200 at HS     (I67.9) Cerebral vascular disease   (F02.B0) Moderate dementia associated with other underlying disease, without behavioral disturbance, psychotic disturbance, mood disturbance, or anxiety (H)  Comment: likely secondary to Parkinson's and vascular disease   Plan: AL support for med admin, meals, activity        (I48.0) Paroxysmal atrial fibrillation (H)  Comment:   Pulse Readings from Last 4 Encounters:   03/06/24 83   02/13/24 96   01/31/24 99   01/29/24 99      Plan: not currently on rate slowing medications, but given falls would consider metoprolol   Apixaban 5 mg bid for stroke prophylaxis      (M15.9) Primary osteoarthritis involving multiple joints  (R26.81) Gait instability  Comment: ambulating significant distances with 4WW   Plan: acetaminophen 1000 mg bid and PRN, local measures with BioFreeze     (K59.01) Slow transit constipation  Comment: by history, with bleeding hemorrhoids  Plan: Fiber, Senna, follow     Advance directive: full code by signed LOU Che MD

## 2024-03-06 NOTE — LETTER
"    3/6/2024        RE: Jennifer Venegas  C/o Jana Lin  [address] [place] [address]        Jennifer Venegas is a 84 year old female seen March 6, 2024 at [facility] where she has resided for 3 months (admit 12/2023) seen for initial visit.  Pt is seen in her apartment up to chair, ambulates with 4WW    Feels she is doing well, notes \"I don't have any memory trouble\"   Goes to DR or downstairs 4x/day and ambulates in the hallways with her 4WW    \"Knees are kind of achy\"   Reports she is eating /sleeping well, likes her new place.       By chart review, pt is followed by [facility] Dr Figueroa for Parkinson's disease, seen in December.   Right hand tremor started in 2010.   Developed cognitive symptoms, started on donepezil      Pt had a November 2023 hospitalization at [facility] after a fall at home resulting in traumatic rhabdomyolysis.   Dx'd with progressive dementia and FTT, needing higher level of care.   She discharged to TCU before transitioning to AL     Pt hospitalized at [facility] again in January 2024 for rectal bleeding attributed to external hemorrhoids in the setting of anticoagulation with apixaban for PAF.   Hgb stable at 13     Returned to [place]      Past Medical History:   Diagnosis Date     Acne rosacea      Anemia 1989     Arthritis      Basal cell carcinoma      Cataract, unspecified cataract type, unspecified laterality      Cervicalgia      Degeneration of cervical intervertebral disc 06/1999    C3-C7     Hearing loss      Hyperlipidemia      Lumbago      Osteoarthrosis      Pain in right shoulder      Parkinson's disease      Resting tremor      Rhinitis, allergic      Right leg weakness      Varicose vein of leg        Past Surgical History:   Procedure Laterality Date     APPENDECTOMY  1964     COLONOSCOPY  10/31/2006     EYE SURGERY  10/05/2011    blepharoplasty     GYN SURGERY  1974    Ligate fallopian tube     HERNIA REPAIR  1964    Incisional hernia, " "reducible     ORTHOPEDIC SURGERY Right     arthroscopy of joint shoulder     ORTHOPEDIC SURGERY Left 04/10/2017    knee arthroplasty     REVERSE ARTHROPLASTY SHOULDER Right 10/25/2018    Procedure: RIGHT REVERSE TOTAL SHOULDER ARTHROPLASTY;  Surgeon: Edd Currie MD;  Location: [facility] OR     SIGMOIDOSCOPY FLEXIBLE  10/2000      SH:  Lived in [facility] for 18 years, moved to [place] in       [place] for 3 years   Son Philip in [place]    Son in [place], is an   Daughter in [place], is a teacher   Sold her car  Non smoker    ROS:  ambulatory with 4ww  Wt Readings from Last 5 Encounters:   24 73.9 kg (163 lb)   24 70.3 kg (155 lb)   24 70.3 kg (155 lb)   24 70.3 kg (155 lb)   23 70.3 kg (155 lb)      EXAM: NAD  /66   Pulse 83   Temp 97.7  F (36.5  C)   Resp 20   Ht 1.626 m (5' 4\")   Wt 73.9 kg (163 lb)   SpO2 97%   BMI 27.98 kg/m     Neck supple without adenopathy  Lungs clear bilaterally with fair air movement   Heart RRR s1s2   Abd soft, NT, no distention or guarding, +BS  Ext without edema   Neuro: minimal hand tremor, ambulatory with walker   Lacks insight, +STML   Psych:  affect okay, pleasant and conversational    LABS reviewed: on  BMP within normal limits   Cr 0.65   GFR >60  Hgb 13.2  stable     MR Brain W/WO IV Cont   2023    FINDINGS:  No evidence for acute infarct. Small chronic cerebellar infarcts. Chronic lacunar infarct within the gaby just to the right of midline. Patchy confluent zones of T2 and FLAIR signal hyperintensity within the cerebral white matter. Small chronic lacunar infarct anterior left thalamus. Multiple scattered punctate foci of hypointense signal on the axial T2*images within the cerebellum and bilateral cerebral hemispheres including the right thalamus are nonspecific but may represent small foci of hemosiderin deposition or calcification. Mild volume loss. No abnormal intra-axial enhancement. " Postoperative changes involving the optic lens.  IMPRESSION:    1. No evidence for acute infarct.  2. Small chronic cerebellar infarcts. Chronic lacunar infarcts within the right gaby and anterior left thalamus.  3. Advanced chronic microvascular ischemic changes within the cerebral white matter.  4. Mild volume loss.  5. No abnormal intra-axial enhancement.      IMP/PLAN:  (G20.B2) Parkinson's disease with dyskinesia and fluctuating manifestations    Comment: longstanding, followed by Neurology   Plan: Sinemet 25/250 tid, 25/100 at HS, Sinemet ER 50/200 at HS     (I67.9) Cerebral vascular disease   (F02.B0) Moderate dementia associated with other underlying disease, without behavioral disturbance, psychotic disturbance, mood disturbance, or anxiety (H)  Comment: likely secondary to Parkinson's and vascular disease   Plan: AL support for med admin, meals, activity        (I48.0) Paroxysmal atrial fibrillation (H)  Comment:   Pulse Readings from Last 4 Encounters:   03/06/24 83   02/13/24 96   01/31/24 99   01/29/24 99      Plan: not currently on rate slowing medications, but given falls would consider metoprolol   Apixaban 5 mg bid for stroke prophylaxis      (M15.9) Primary osteoarthritis involving multiple joints  (R26.81) Gait instability  Comment: ambulating significant distances with 4WW   Plan: acetaminophen 1000 mg bid and PRN, local measures with BioFreeze     (K59.01) Slow transit constipation  Comment: by history, with bleeding hemorrhoids  Plan: Fiber, Senna, follow     Advance directive: full code by signed LOU Che MD       Sincerely,        Izzy Che MD

## 2024-03-13 NOTE — PROGRESS NOTES
New orders for Jennifer Venegas:    -discontinue Apixaban      NAREN Rothman CNP on 3/13/2024 at 5:07 PM

## 2024-04-03 NOTE — PROGRESS NOTES
[facility]  [place] [facility] Record Number:  [id number]  Place of Service where encounter took place:  [facility] [[id number]]  Chief Complaint   Patient presents with    Edema       HPI:    Jennifer Venegas  is a 84 year old (1940), who is being seen today for an episodic care visit.  HPI information obtained from: facility chart records, facility staff, and patient report. Today's concern is:    Seen today for newer onset of lower extremity edema. She is not sure when started. Legs do not cause her pain. No recent Echo. HR elevated today at visit. No s/s. Reviewed with her son. He would like to hold on starting lasix. He is ok with compression and will purchase Edemawear versus Jobst as the facility staff would need to assist her and this would increase her service cost in AL setting. She would leave Edemawear on 24/7 and off for showers, prn.     Past Medical and Surgical History reviewed in Epic today.    MEDICATIONS:    Current Outpatient Medications   Medication Sig Dispense Refill    acetaminophen (TYLENOL) 500 MG tablet Take 2 tablets (1,000 mg) by mouth 2 times daily. May also take 2 tablets (1,000 mg) daily as needed for mild pain or fever. 264 tablet 11    Calcium Polycarbophil (FIBER) 625 MG tablet Take 2 tablets (1,250 mg) by mouth daily 180 tablet 3    carbidopa-levodopa (SINEMET CR)  MG CR tablet Take 1 tablet by mouth at bedtime At 7pm      carbidopa-levodopa (SINEMET CR)  MG CR tablet Take 1 tablet by mouth at bedtime      carbidopa-levodopa (SINEMET)  MG tablet Take 1 tablet by mouth 3 times daily      donepezil (ARICEPT) 10 MG tablet Take 1 tablet (10 mg) by mouth daily 90 tablet 3    SENNA-docusate sodium (SENNA S) 8.6-50 MG tablet Take 1 tablet by mouth at bedtime May have 1 tablet po BID PRN 30 tablet 11     REVIEW OF SYSTEMS:  Limited secondary to cognitive impairment but today pt reports ok    Objective:  BP (!) 162/94    "Pulse 87   Temp 97.3  F (36.3  C)   Resp 22   Ht 1.626 m (5' 4\")   Wt 73.9 kg (163 lb)   SpO2 97%   BMI 27.98 kg/m    Exam:  GENERAL APPEARANCE:  Alert, in no distress  ENT:  Mouth and posterior oropharynx normal, moist mucous membranes, ears clear of wax  EYES:  EOM, conjunctivae, lids, pupils and irises normal  RESP:  lungs clear to auscultation , diminished breath sounds throughout, fine crackles posterior   CV:  Palpation and auscultation of heart done , regular rate and irregular rhythm, no murmur, rub, or gallop, trace edema NOW R>L 2+ pitting to ankles   ABDOMEN:  bowel sounds normal  M/S:   ambulates with 4WW  SKIN:  dry skin, bruising to shins   NEURO:   Cranial nerves 2-12 are normal tested and grossly at patient's baseline  PSYCH:  insight and judgement impaired, memory impaired    Labs: reviewed in Care Everywhere     ASSESSMENT/PLAN:  (I48.0) Paroxysmal atrial fibrillation (H)  (primary encounter diagnosis)  (I47.10) Paroxysmal supraventricular tachycardia (H24)  (R60.0) Bilateral lower extremity edema  Comment:   Plan:   -Adult Cardiology Eval  Referral per son's request  -declining updated weight today from facility with cost of care  -ok for compression  -declining lasix for now?  -encouraged to elevate feet  -declining CXR for now                Electronically signed by:  NAREN Rothman CNP           "

## 2024-04-03 NOTE — LETTER
4/3/2024        RE: Jennifer Venegas  C/o Jana Lin  [address] [place] [address]        [facility]  [place] [facility] Record Number:  [id number]  Place of Service where encounter took place:  [facility] [[id number]]  Chief Complaint   Patient presents with     Edema       HPI:    Jennifer Venegas  is a 84 year old (1940), who is being seen today for an episodic care visit.  HPI information obtained from: facility chart records, facility staff, and patient report. Today's concern is:    Seen today for newer onset of lower extremity edema. She is not sure when started. Legs do not cause her pain. No recent Echo. HR elevated today at visit. No s/s. Reviewed with her son. He would like to hold on starting lasix. He is ok with compression and will purchase Edemawear versus Jobst as the facility staff would need to assist her and this would increase her service cost in AL setting. She would leave Edemawear on 24/7 and off for showers, prn.     Past Medical and Surgical History reviewed in Epic today.    MEDICATIONS:    Current Outpatient Medications   Medication Sig Dispense Refill     acetaminophen (TYLENOL) 500 MG tablet Take 2 tablets (1,000 mg) by mouth 2 times daily. May also take 2 tablets (1,000 mg) daily as needed for mild pain or fever. 264 tablet 11     Calcium Polycarbophil (FIBER) 625 MG tablet Take 2 tablets (1,250 mg) by mouth daily 180 tablet 3     carbidopa-levodopa (SINEMET CR)  MG CR tablet Take 1 tablet by mouth at bedtime At 7pm       carbidopa-levodopa (SINEMET CR)  MG CR tablet Take 1 tablet by mouth at bedtime       carbidopa-levodopa (SINEMET)  MG tablet Take 1 tablet by mouth 3 times daily       donepezil (ARICEPT) 10 MG tablet Take 1 tablet (10 mg) by mouth daily 90 tablet 3     SENNA-docusate sodium (SENNA S) 8.6-50 MG tablet Take 1 tablet by mouth at bedtime May have 1 tablet po BID PRN 30 tablet  "11     REVIEW OF SYSTEMS:  Limited secondary to cognitive impairment but today pt reports ok    Objective:  BP (!) 162/94   Pulse 87   Temp 97.3  F (36.3  C)   Resp 22   Ht 1.626 m (5' 4\")   Wt 73.9 kg (163 lb)   SpO2 97%   BMI 27.98 kg/m    Exam:  GENERAL APPEARANCE:  Alert, in no distress  ENT:  Mouth and posterior oropharynx normal, moist mucous membranes, ears clear of wax  EYES:  EOM, conjunctivae, lids, pupils and irises normal  RESP:  lungs clear to auscultation , diminished breath sounds throughout, fine crackles posterior   CV:  Palpation and auscultation of heart done , regular rate and irregular rhythm, no murmur, rub, or gallop, trace edema NOW R>L 2+ pitting to ankles   ABDOMEN:  bowel sounds normal  M/S:   ambulates with 4WW  SKIN:  dry skin, bruising to shins   NEURO:   Cranial nerves 2-12 are normal tested and grossly at patient's baseline  PSYCH:  insight and judgement impaired, memory impaired    Labs: reviewed in Care Everywhere     ASSESSMENT/PLAN:  (I48.0) Paroxysmal atrial fibrillation (H)  (primary encounter diagnosis)  (I47.10) Paroxysmal supraventricular tachycardia (H24)  (R60.0) Bilateral lower extremity edema  Comment:   Plan:   -Adult Cardiology Eval  Referral per son's request  -declining updated weight today from facility with cost of care  -ok for compression  -declining lasix for now?  -encouraged to elevate feet  -declining CXR for now                Electronically signed by:  NAREN Rothman CNP             Sincerely,        NAREN Rothman CNP      "

## 2024-04-24 NOTE — PROGRESS NOTES
[facility]  [place] [facility] Record Number:  [id number]  Place of Service where encounter took place:  [facility] [[id number]]  Chief Complaint   Patient presents with    Assisted Living Acute     diarrhea       HPI:    Jennifer Venegas  is a 84 year old (1940), who is being seen today for an episodic care visit.  HPI information obtained from: facility chart records, facility staff, patient report, and [facility] chart review. Today's concern is:    Seen today for follow up to diarrhea. She had 4 episodes last week per facility notes. Her Senna S was held and then reduced to 1 tab po 3x weekly in addition to daily fiber supplement. She has a history of constipation/hemorrhoids so need to watch for bowel issues with reduced medications. Also, does not like to call for assist with toileting secondary to increased cost of care and this could be an issue with skin breakdown. I assist her during the visit. Brief is soaked also with smear of stool.     Wearing Edemawear for newer LE edema. Also with pedal and toe edema. Cover the top of foot with compression but does interfere with her sandals fit. Typically does not wear socks.     Past Medical and Surgical History reviewed in Epic today.    MEDICATIONS:    Current Outpatient Medications   Medication Sig Dispense Refill    apixaban ANTICOAGULANT (ELIQUIS) 5 MG tablet Take 1 tablet (5 mg) by mouth 2 times daily      metroNIDAZOLE (METROGEL) 1 % external gel Apply topically daily To forehead and face until resolved.  Avoid contact with eyes. Ok to keep in room 60 g 4    SENNA-docusate sodium (SENNA S) 8.6-50 MG tablet Take 1 tablet by mouth three times a week May have 1 tablet po daily PRN      acetaminophen (TYLENOL) 500 MG tablet Take 2 tablets (1,000 mg) by mouth 2 times daily. May also take 2 tablets (1,000 mg) daily as needed for mild pain or fever. 264 tablet 11    Calcium Polycarbophil (FIBER) 625 MG tablet  "Take 2 tablets (1,250 mg) by mouth daily 180 tablet 3    carbidopa-levodopa (SINEMET CR)  MG CR tablet Take 1 tablet by mouth at bedtime At 7pm      carbidopa-levodopa (SINEMET CR)  MG CR tablet Take 1 tablet by mouth at bedtime      carbidopa-levodopa (SINEMET)  MG tablet Take 1 tablet by mouth 3 times daily      donepezil (ARICEPT) 10 MG tablet Take 1 tablet (10 mg) by mouth daily 90 tablet 3     REVIEW OF SYSTEMS:  Limited secondary to cognitive impairment but today pt reports ok    Objective:  BP (!) 140/80   Pulse 88   Temp 96.9  F (36.1  C)   Resp 16   Ht 1.626 m (5' 4\")   Wt 78.7 kg (173 lb 9.6 oz)   SpO2 97%   BMI 29.80 kg/m    Exam:  GENERAL APPEARANCE:  Alert, in no distress  ENT:  Mouth and posterior oropharynx normal, moist mucous membranes, ears clear of wax  EYES:  EOM, conjunctivae, lids, pupils and irises normal  RESP:  lungs clear to auscultation , diminished breath sounds throughout, fine crackles posterior   CV:  Palpation and auscultation of heart done , regular rate and irregular rhythm, no murmur, rub, or gallop, trace edema R>L 2+ pitting to ankles now 1+ to legs, ankles and feet  ABDOMEN:  bowel sounds normal  M/S:   ambulates with 4WW  SKIN:  dry skin, rash to face and forehead  NEURO:   Cranial nerves 2-12 are normal tested and grossly at patient's baseline  PSYCH:  insight and judgement impaired, memory impaired    Labs:   Ordering updated labs    ASSESSMENT/PLAN:  (K59.01) Slow transit constipation  (primary encounter diagnosis)  Comment: resolved with adjustment to bowel medications   Plan:   -Senna S reduced to 1 tab po 3x weekly and daily prn  -fiber supp daily           (L71.9) Rosacea  Comment: hx of and complaints of flare today. Prior use of doxycycline  Plan:   -metroNIDAZOLE (METROGEL) 1 % external gel and ok for her to keep in apartment          (R60.0) Bilateral lower extremity edema  (I48.0) Paroxysmal atrial fibrillation (H)  Comment: ongoing   Plan: "   -had stopped apixaban with EKG showing sinus tach but facility did not discontinue on their end. Added med back to our list as taking and with irregular HR most likely some a-fib. Cardiology consult placed but family has not pursued      -labs ordered today    -BMP, CBC, Liver Function Tests for HTN   -TSH for thyroid screen    Electronically signed by:  NAREN Rothman CNP

## 2024-04-24 NOTE — LETTER
Jennifer Venegas orders:     -begin     metroNIDAZOLE (METROGEL) 1 % external gel Apply topically daily To forehead and face until resolved. Avoid contact with eyes. Ok to keep in room     -BMP, CBC, Liver Function Tests for HTN     -TSH for thyroid screen    NAREN Rothman CNP on 4/24/2024 at 3:07 PM

## 2024-04-24 NOTE — LETTER
4/24/2024        RE: Jennifer Venegas  C/o Jana Lin  [address] [place] [address]        [facility]  [facility] [facility] Record Number:  [id number]  Place of Service where encounter took place:  [facility] [[id number]]  Chief Complaint   Patient presents with     Assisted Living Acute     diarrhea       HPI:    Jennifer Venegas  is a 84 year old (1940), who is being seen today for an episodic care visit.  HPI information obtained from: facility chart records, facility staff, patient report, and [facility] chart review. Today's concern is:    Seen today for follow up to diarrhea. She had 4 episodes last week per facility notes. Her Senna S was held and then reduced to 1 tab po 3x weekly in addition to daily fiber supplement. She has a history of constipation/hemorrhoids so need to watch for bowel issues with reduced medications. Also, does not like to call for assist with toileting secondary to increased cost of care and this could be an issue with skin breakdown. I assist her during the visit. Brief is soaked also with smear of stool.     Wearing Edemawear for newer LE edema. Also with pedal and toe edema. Cover the top of foot with compression but does interfere with her sandals fit. Typically does not wear socks.     Past Medical and Surgical History reviewed in Epic today.    MEDICATIONS:    Current Outpatient Medications   Medication Sig Dispense Refill     apixaban ANTICOAGULANT (ELIQUIS) 5 MG tablet Take 1 tablet (5 mg) by mouth 2 times daily       metroNIDAZOLE (METROGEL) 1 % external gel Apply topically daily To forehead and face until resolved.  Avoid contact with eyes. Ok to keep in room 60 g 4     SENNA-docusate sodium (SENNA S) 8.6-50 MG tablet Take 1 tablet by mouth three times a week May have 1 tablet po daily PRN       acetaminophen (TYLENOL) 500 MG tablet Take 2 tablets (1,000 mg) by mouth 2 times daily. May also take  "2 tablets (1,000 mg) daily as needed for mild pain or fever. 264 tablet 11     Calcium Polycarbophil (FIBER) 625 MG tablet Take 2 tablets (1,250 mg) by mouth daily 180 tablet 3     carbidopa-levodopa (SINEMET CR)  MG CR tablet Take 1 tablet by mouth at bedtime At 7pm       carbidopa-levodopa (SINEMET CR)  MG CR tablet Take 1 tablet by mouth at bedtime       carbidopa-levodopa (SINEMET)  MG tablet Take 1 tablet by mouth 3 times daily       donepezil (ARICEPT) 10 MG tablet Take 1 tablet (10 mg) by mouth daily 90 tablet 3     REVIEW OF SYSTEMS:  Limited secondary to cognitive impairment but today pt reports ok    Objective:  BP (!) 140/80   Pulse 88   Temp 96.9  F (36.1  C)   Resp 16   Ht 1.626 m (5' 4\")   Wt 78.7 kg (173 lb 9.6 oz)   SpO2 97%   BMI 29.80 kg/m    Exam:  GENERAL APPEARANCE:  Alert, in no distress  ENT:  Mouth and posterior oropharynx normal, moist mucous membranes, ears clear of wax  EYES:  EOM, conjunctivae, lids, pupils and irises normal  RESP:  lungs clear to auscultation , diminished breath sounds throughout, fine crackles posterior   CV:  Palpation and auscultation of heart done , regular rate and irregular rhythm, no murmur, rub, or gallop, trace edema R>L 2+ pitting to ankles now 1+ to legs, ankles and feet  ABDOMEN:  bowel sounds normal  M/S:   ambulates with 4WW  SKIN:  dry skin, rash to face and forehead  NEURO:   Cranial nerves 2-12 are normal tested and grossly at patient's baseline  PSYCH:  insight and judgement impaired, memory impaired    Labs:   Ordering updated labs    ASSESSMENT/PLAN:  (K59.01) Slow transit constipation  (primary encounter diagnosis)  Comment: resolved with adjustment to bowel medications   Plan:   -Senna S reduced to 1 tab po 3x weekly and daily prn  -fiber supp daily           (L71.9) Rosacea  Comment: hx of and complaints of flare today. Prior use of doxycycline  Plan:   -metroNIDAZOLE (METROGEL) 1 % external gel and ok for her to keep in " apartment          (R60.0) Bilateral lower extremity edema  (I48.0) Paroxysmal atrial fibrillation (H)  Comment: ongoing   Plan:   -had stopped apixaban with EKG showing sinus tach but facility did not discontinue on their end. Added med back to our list as taking and with irregular HR most likely some a-fib. Cardiology consult placed but family has not pursued      -labs ordered today    -BMP, CBC, Liver Function Tests for HTN   -TSH for thyroid screen    Electronically signed by:  NAREN Rothman CNP           Sincerely,        NAREN Rothman CNP

## 2024-04-29 NOTE — PATIENT INSTRUCTIONS
We have a number of options to help decrease the fluctuations in your Parkinson's disease symptoms that you are experiencing. One option is to start a medication called Entacapone (Comtan) which works by extending the time that Carbidopa/Levodopa is working in your system. There is also another formulation of Carbidopa/Levodopa called Rytary but this can be prohibitively expensive.     Please start Entacapone 200 mg with your 7am, 11am and 3pm doses. The main side effects to watch out for are extra wriggling movements (like Walter Puckett). It can also cause discoloration of your urine.     Since it is not clear that Donepezil is providing much benefit in terms of your memory and may be contributing to your diarrhea, we can try decreasing to dose to 5 mg to see if that improves diarrhea. Please pay attention to your thinking and memory as we would want to make sure that this doesn't get worse with this change.     Changed prescription to 5 mg Donepezil daily.     We can also place a referral to our [facility] specialty pharmacy to help review your medications and keep track of these changes. They can also look into the potential cost of Rytary.      7am 11am 3pm 7pm   Carbidopa/Levodopa  mg 1 1 1     Entacapone 200 mg  1 1 1    Carbidopa/Levodopa  mg CR       1   Carbidopa//Levodopa  mg CR       1     Follow up in three months. Please reach out with any additional questions or concerns in the meantime.

## 2024-04-29 NOTE — TELEPHONE ENCOUNTER
Faxed Form April 29, 2024 to fax number [phone]    Right Fax confirmed at 9:30 AM    Darling Schuster MA

## 2024-04-29 NOTE — TELEPHONE ENCOUNTER
Faxed refill auth request to [facility] pharmacy. Refill requested provider sign prescription before medication may be dispensed. Provider signed form for carbidopa-levodopa (SINEMET CR)  MG CR tablet   Quantity: 30  Refill: 11x  Instructions:  Take 1 tablet by mouth at bedtime         Darling Schuster MA

## 2024-04-29 NOTE — LETTER
2024         RE: Jennifer Venegas  C/o Jana Grieszenon  [address] [facility] [address]        Dear Colleague,    Thank you for referring your patient, Jennifer Venegas, to the [facility]. Please see a copy of my visit note below.    Department of Neurology  Movement Disorders Division   Follow-up Note    Patient: Jennifer Venegas   MRN: [id number]   : 1940   Date of Visit: 2024     Chief Complaint:  Jennifer Venegas is a 84 year old female who returns to clinic for follow up of Parkinson's disease.    Interval History:  Ms. Venegas was initially seen on 24 to establish care for Parkinson's disease at which time an additional  mg CR tablet of CD/LD was added at bedtime to target overnight OFF symptoms. We also discussed PD GENEration project given family history of Parkinson's disease.     She presents today with her son, Philip.     They report that overall things seem to be pretty stable.     She had an episode where she was found on the ground previously. She had wanted to walk to her walker and needed to walk around her bed. She felt this was taking too long and decided to try crawling. She realized her knees were hurting really badly so she ended up stopping. People came to help her at that time and insisted on having her go to the hospital which she found really irritating.     She has had two other episodes similar to this in which she has been found on the ground. She had one episode when living in assisted living where she was down for several days. She was confused and dehydrated.     Concern for hallucinations documented in Dr. Figueroa's note occurred in the context of her significant hospitalization after being found down for several days.     Overall staff is doing pretty good on getting medications in on time - they generally aren't more than 10 minutes late. This has been good for her mentally as well as her alarm would go  "off for medications and then she would sit and wait for staff to bring meds.    Parkinson Disease Motor Symptom Review:    Motor fluctuations:     Dyskinesia: Denies  Wearing off:  Taking one hour to kick in and then works for about two hours and then wearing off one hour prior to her next dose (she is currently taking doses every four hours). Her left foot seems to become more immobile before her right foot.   Freezing of gait: Yes - happens frequently when walking. Usually when first getting up and with turning.   Tremor: Tremor is constant - only in the right hand. Maybe a little worse than since she was last seen. Maybe a higher amplitude.  Rigidity: Stable from prior  Bradykinesia: Stable from prior    She gets physical therapy once a week but she states she doesn't do her share. She is considering dropping it. She states she is just lazy. Sometimes she feels so week she doesn't feel like she can move or do anything.      Parkinson's Disease Non-motor Symptom Review:    Psychiatric disturbances - Mood has been \"normal.\" Denies feeling down, depressed or anxious. If anything her mood has improved as her current living facility is more social and she has gotten to know more people.   Cognitive impairment -  She denies any concerns about thinking or memory. Her son never noticed that donepezil has provided much benefit.   Sleep disturbances - She is not sleeping well - her legs get tangled up in the sheets. She hasn't noticed if her sleep is better with the additional  mg CD/LD CR at bedtime. She wakes up earlier in the morning tangled in her sheets and can't get her leg out from under the covers.   GI symptoms - She gets bouts of diarrhea. She had a bout last week and has been having some diarrhea today.   Balance - No recent falls. She has been very careful with her walker.   Autonomic dysfunction - No dizziness or lightheadedness.   Hallucinations - Adamantly denies.   Swallowing - Denies problems with " swallowing.       Current Medications:   Current Outpatient Medications   Medication Sig Dispense Refill     acetaminophen (TYLENOL) 500 MG tablet Take 2 tablets (1,000 mg) by mouth 2 times daily. May also take 2 tablets (1,000 mg) daily as needed for mild pain or fever. 264 tablet 11     apixaban ANTICOAGULANT (ELIQUIS) 5 MG tablet Take 1 tablet (5 mg) by mouth 2 times daily       Calcium Polycarbophil (FIBER) 625 MG tablet Take 2 tablets (1,250 mg) by mouth daily 180 tablet 3     carbidopa-levodopa (SINEMET CR)  MG CR tablet Take 1 tablet by mouth at bedtime At 7pm       carbidopa-levodopa (SINEMET CR)  MG CR tablet Take 1 tablet by mouth at bedtime       carbidopa-levodopa (SINEMET)  MG tablet Take 1 tablet by mouth 3 times daily       donepezil (ARICEPT) 10 MG tablet Take 1 tablet (10 mg) by mouth daily 90 tablet 3     metroNIDAZOLE (METROGEL) 1 % external gel Apply topically daily To forehead and face until resolved.  Avoid contact with eyes. Ok to keep in room 60 g 4     SENNA-docusate sodium (SENNA S) 8.6-50 MG tablet Take 1 tablet by mouth three times a week May have 1 tablet po daily PRN         Related Medications 7am 11am 3pm 7pm   Carbidopa/Levodopa  mg 1 1 1     Carbidopa/Levodopa  mg CR       1   Carbidopa//Levodopa  mg CR       1   Donepezil 10 mg       1        Allergies: is allergic to other [seasonal allergies].    Past Medical History:  Past Medical History:   Diagnosis Date     Acne rosacea      Anemia 1989     Arthritis      Basal cell carcinoma      Cataract, unspecified cataract type, unspecified laterality      Cervicalgia      Degeneration of cervical intervertebral disc 06/1999    C3-C7     Hearing loss      Hyperlipidemia      Lumbago      Osteoarthrosis      Pain in right shoulder      Parkinson's disease      Resting tremor      Rhinitis, allergic      Right leg weakness      Varicose vein of leg        Past Surgical History:  Past Surgical History:    Procedure Laterality Date     APPENDECTOMY  1964     COLONOSCOPY  10/31/2006     EYE SURGERY  10/05/2011    blepharoplasty     GYN SURGERY  1974    Ligate fallopian tube     HERNIA REPAIR  1964    Incisional hernia, reducible     ORTHOPEDIC SURGERY Right 2005    arthroscopy of joint shoulder     ORTHOPEDIC SURGERY Left 04/10/2017    knee arthroplasty     REVERSE ARTHROPLASTY SHOULDER Right 10/25/2018    Procedure: RIGHT REVERSE TOTAL SHOULDER ARTHROPLASTY;  Surgeon: Edd Currie MD;  Location: [facility] OR     SIGMOIDOSCOPY FLEXIBLE  10/2000       Social History:  Social History     Socioeconomic History     Marital status:    Tobacco Use     Smoking status: Never     Smokeless tobacco: Never   Substance and Sexual Activity     Alcohol use: Yes     Comment: occasional     Drug use: No     Social Determinants of Health     Interpersonal Safety: Unknown (1/22/2024)    Received from [employer]    Humiliation, Afraid, Rape, and Kick questionnaire      Physically Abused: No      Sexually Abused: No   Housing Stability: Unknown (1/22/2024)    Received from [employer]    Housing Stability Vital Sign      Unable to Pay for Housing in the Last Year: No      In the last 12 months, was there a time when you did not have a steady place to sleep or slept in a shelter (including now)?: No       Family History:  Family History   Problem Relation Age of Onset     Neurologic Disorder Mother      Parkinsonism Mother      Dementia Mother      Prostate Cancer Father      Hypertension Father      Prostate Cancer Brother      Heart Disease Brother        Physical Exam:  The patient's  weight is 78 kg (172 lb). Her blood pressure is 151/79 (abnormal) and her pulse is 96. Her oxygen saturation is 95%.      Last dose of medication at 11am    Neurological Examination:       4/29/2024     2:00 PM   UPDRS Motor Scale   Time: 14:23   Medication Off   R Brain DBS: None   L Brain DBS: None    Dyskinesia (LID) No   Speech 1   Facial Expression 3   Finger Taps R 3   Finger Taps L 3   Hand Mvt R 3   Hand Mvt L 3   Pron-/Supinate R 3   Pron-/Supinate L 3   Gait 3   Gait Freezing 0   Postural Stability 3   Posture 3   Global Spont Mvt 3   Rest Tremor RUE 2   Rest Tremor LUE 0   Rest Tremor RLE 0   Rest Tremor LLE 0   Rest Tremor Lip/Jaw 0   Rest Tremor Constancy 4     Ambulates with walker, slow and shuffling.     Data Reviewed:   Geriatrics notes from Dr. Singh    Impression:  Jennifer Venegas is a 84 year old female with Parkinson's disease. She and her son report that overall things are relatively stable but she continues to experience bothersome wearing off between doses. Unfortunately, decreasing the time interval between doses of Sinemet is challenging due to her current living situation and need for staff to administer medications. We discussed options to make medications last longer including adding entacapone to her regimen vs switching to Rytary. Advised that Rytary can be very helpful but is also expensive if not adequately covered by insurance. We opted to add Entacapone to her regimen for the time being and placed a referral to [facility] pharmacy for assistance with medication adjustments. She is also bothered by diarrhea which could be related to donepezil. As her son has not noticed any improvement in terms of her memory since being on this medication, we advised decreasing the dose by half and monitoring if diarrhea improves and/or memory worsens. Further adjustments can be made pending her response to this change.     Recommendations:     - Add Entacapone 200 mg TID to doses of daytime CD/LD IR  Related Medications 7am 11am 3pm 7pm   Carbidopa/Levodopa  mg 1 1 1     Entacapone 200 mg  1 1 1    Carbidopa/Levodopa  mg CR       1   Carbidopa//Levodopa  mg CR       1   - Decrease Donepezil to 5 mg to see if this improves diarrhea  - If entacapone does not provide sufficient  benefit, would consider [facility] pharmacy referral  - Encouraged Ms. Venegas to try get consistent exercise    Follow up in 30 minutes    Time Spent: 50 minutes spent on the date of the encounter doing chart review, history and exam, documentation and further activities as noted above    Ms. Venegas was seen and discussed with movement disorder attending, Dr. Garcia.     The longitudinal plan of care for the diagnosis(es)/condition(s) as documented were addressed during this visit. Due to the added complexity in care, I will continue to support Jennifer in the subsequent management and with ongoing continuity of care.    Tanvi Lindquist MD  Movement Disorders Fellow    CC: Parkinson's disease      Again, thank you for allowing me to participate in the care of your patient.        Sincerely,        Tanvi Lindquist MD

## 2024-04-29 NOTE — PROGRESS NOTES
Department of Neurology  Movement Disorders Division   Follow-up Note    Patient: Jennifer Venegas   MRN: [id number]   : 1940   Date of Visit: 2024     Chief Complaint:  Jennifer Venegas is a 84 year old female who returns to clinic for follow up of Parkinson's disease.    Interval History:  Ms. Venegas was initially seen on 24 to establish care for Parkinson's disease at which time an additional  mg CR tablet of CD/LD was added at bedtime to target overnight OFF symptoms. We also discussed PD GENEration project given family history of Parkinson's disease.     She presents today with her son, Philip.     They report that overall things seem to be pretty stable.     She had an episode where she was found on the ground previously. She had wanted to walk to her walker and needed to walk around her bed. She felt this was taking too long and decided to try crawling. She realized her knees were hurting really badly so she ended up stopping. People came to help her at that time and insisted on having her go to the hospital which she found really irritating.     She has had two other episodes similar to this in which she has been found on the ground. She had one episode when living in assisted living where she was down for several days. She was confused and dehydrated.     Concern for hallucinations documented in Dr. Figueroa's note occurred in the context of her significant hospitalization after being found down for several days.     Overall staff is doing pretty good on getting medications in on time - they generally aren't more than 10 minutes late. This has been good for her mentally as well as her alarm would go off for medications and then she would sit and wait for staff to bring meds.    Parkinson Disease Motor Symptom Review:    Motor fluctuations:     Dyskinesia: Denies  Wearing off:  Taking one hour to kick in and then works for about two hours and then wearing off one hour prior to her next  "dose (she is currently taking doses every four hours). Her left foot seems to become more immobile before her right foot.   Freezing of gait: Yes - happens frequently when walking. Usually when first getting up and with turning.   Tremor: Tremor is constant - only in the right hand. Maybe a little worse than since she was last seen. Maybe a higher amplitude.  Rigidity: Stable from prior  Bradykinesia: Stable from prior    She gets physical therapy once a week but she states she doesn't do her share. She is considering dropping it. She states she is just lazy. Sometimes she feels so week she doesn't feel like she can move or do anything.      Parkinson's Disease Non-motor Symptom Review:    Psychiatric disturbances - Mood has been \"normal.\" Denies feeling down, depressed or anxious. If anything her mood has improved as her current living facility is more social and she has gotten to know more people.   Cognitive impairment -  She denies any concerns about thinking or memory. Her son never noticed that donepezil has provided much benefit.   Sleep disturbances - She is not sleeping well - her legs get tangled up in the sheets. She hasn't noticed if her sleep is better with the additional  mg CD/LD CR at bedtime. She wakes up earlier in the morning tangled in her sheets and can't get her leg out from under the covers.   GI symptoms - She gets bouts of diarrhea. She had a bout last week and has been having some diarrhea today.   Balance - No recent falls. She has been very careful with her walker.   Autonomic dysfunction - No dizziness or lightheadedness.   Hallucinations - Adamantly denies.   Swallowing - Denies problems with swallowing.       Current Medications:   Current Outpatient Medications   Medication Sig Dispense Refill    acetaminophen (TYLENOL) 500 MG tablet Take 2 tablets (1,000 mg) by mouth 2 times daily. May also take 2 tablets (1,000 mg) daily as needed for mild pain or fever. 264 tablet 11    " apixaban ANTICOAGULANT (ELIQUIS) 5 MG tablet Take 1 tablet (5 mg) by mouth 2 times daily      Calcium Polycarbophil (FIBER) 625 MG tablet Take 2 tablets (1,250 mg) by mouth daily 180 tablet 3    carbidopa-levodopa (SINEMET CR)  MG CR tablet Take 1 tablet by mouth at bedtime At 7pm      carbidopa-levodopa (SINEMET CR)  MG CR tablet Take 1 tablet by mouth at bedtime      carbidopa-levodopa (SINEMET)  MG tablet Take 1 tablet by mouth 3 times daily      donepezil (ARICEPT) 10 MG tablet Take 1 tablet (10 mg) by mouth daily 90 tablet 3    metroNIDAZOLE (METROGEL) 1 % external gel Apply topically daily To forehead and face until resolved.  Avoid contact with eyes. Ok to keep in room 60 g 4    SENNA-docusate sodium (SENNA S) 8.6-50 MG tablet Take 1 tablet by mouth three times a week May have 1 tablet po daily PRN         Related Medications 7am 11am 3pm 7pm   Carbidopa/Levodopa  mg 1 1 1     Carbidopa/Levodopa  mg CR       1   Carbidopa//Levodopa  mg CR       1   Donepezil 10 mg       1        Allergies: is allergic to other [seasonal allergies].    Past Medical History:  Past Medical History:   Diagnosis Date    Acne rosacea     Anemia 1989    Arthritis     Basal cell carcinoma     Cataract, unspecified cataract type, unspecified laterality     Cervicalgia     Degeneration of cervical intervertebral disc 06/1999    C3-C7    Hearing loss     Hyperlipidemia     Lumbago     Osteoarthrosis     Pain in right shoulder     Parkinson's disease     Resting tremor     Rhinitis, allergic     Right leg weakness     Varicose vein of leg        Past Surgical History:  Past Surgical History:   Procedure Laterality Date    APPENDECTOMY  1964    COLONOSCOPY  10/31/2006    EYE SURGERY  10/05/2011    blepharoplasty    GYN SURGERY  1974    Ligate fallopian tube    HERNIA REPAIR  1964    Incisional hernia, reducible    ORTHOPEDIC SURGERY Right 2005    arthroscopy of joint shoulder    ORTHOPEDIC SURGERY Left  04/10/2017    knee arthroplasty    REVERSE ARTHROPLASTY SHOULDER Right 10/25/2018    Procedure: RIGHT REVERSE TOTAL SHOULDER ARTHROPLASTY;  Surgeon: Edd Currie MD;  Location: [facility] OR    SIGMOIDOSCOPY FLEXIBLE  10/2000       Social History:  Social History     Socioeconomic History    Marital status:    Tobacco Use    Smoking status: Never    Smokeless tobacco: Never   Substance and Sexual Activity    Alcohol use: Yes     Comment: occasional    Drug use: No     Social Determinants of Health     Interpersonal Safety: Unknown (1/22/2024)    Received from [employer]    Humiliation, Afraid, Rape, and Kick questionnaire     Physically Abused: No     Sexually Abused: No   Housing Stability: Unknown (1/22/2024)    Received from [employer]    Housing Stability Vital Sign     Unable to Pay for Housing in the Last Year: No     In the last 12 months, was there a time when you did not have a steady place to sleep or slept in a shelter (including now)?: No       Family History:  Family History   Problem Relation Age of Onset    Neurologic Disorder Mother     Parkinsonism Mother     Dementia Mother     Prostate Cancer Father     Hypertension Father     Prostate Cancer Brother     Heart Disease Brother        Physical Exam:  The patient's  weight is 78 kg (172 lb). Her blood pressure is 151/79 (abnormal) and her pulse is 96. Her oxygen saturation is 95%.      Last dose of medication at 11am    Neurological Examination:       4/29/2024     2:00 PM   UPDRS Motor Scale   Time: 14:23   Medication Off   R Brain DBS: None   L Brain DBS: None   Dyskinesia (LID) No   Speech 1   Facial Expression 3   Finger Taps R 3   Finger Taps L 3   Hand Mvt R 3   Hand Mvt L 3   Pron-/Supinate R 3   Pron-/Supinate L 3   Gait 3   Gait Freezing 0   Postural Stability 3   Posture 3   Global Spont Mvt 3   Rest Tremor RUE 2   Rest Tremor LUE 0   Rest Tremor RLE 0   Rest Tremor LLE 0   Rest Tremor Lip/Jaw 0    Rest Tremor Constancy 4     Ambulates with walker, slow and shuffling.     Data Reviewed:   Geriatrics notes from Dr. Singh    Impression:  Jennifer Venegas is a 84 year old female with Parkinson's disease. She and her son report that overall things are relatively stable but she continues to experience bothersome wearing off between doses. Unfortunately, decreasing the time interval between doses of Sinemet is challenging due to her current living situation and need for staff to administer medications. We discussed options to make medications last longer including adding entacapone to her regimen vs switching to Rytary. Advised that Rytary can be very helpful but is also expensive if not adequately covered by insurance. We opted to add Entacapone to her regimen for the time being and placed a referral to [facility] pharmacy for assistance with medication adjustments. She is also bothered by diarrhea which could be related to donepezil. As her son has not noticed any improvement in terms of her memory since being on this medication, we advised decreasing the dose by half and monitoring if diarrhea improves and/or memory worsens. Further adjustments can be made pending her response to this change.     Recommendations:     - Add Entacapone 200 mg TID to doses of daytime CD/LD IR  Related Medications 7am 11am 3pm 7pm   Carbidopa/Levodopa  mg 1 1 1     Entacapone 200 mg  1 1 1    Carbidopa/Levodopa  mg CR       1   Carbidopa//Levodopa  mg CR       1   - Decrease Donepezil to 5 mg to see if this improves diarrhea  - If entacapone does not provide sufficient benefit, would consider Rytary  - [facility] pharmacy referral  - Encouraged Ms. Venegas to try get consistent exercise    Follow up in 30 minutes    Time Spent: 50 minutes spent on the date of the encounter doing chart review, history and exam, documentation and further activities as noted above    Ms. Venegas was seen and discussed with movement disorder  attending, Dr. Garcia.     The longitudinal plan of care for the diagnosis(es)/condition(s) as documented were addressed during this visit. Due to the added complexity in care, I will continue to support Jennifer in the subsequent management and with ongoing continuity of care.    Tanvi Lindquist MD  Movement Disorders Fellow    CC: Parkinson's disease

## 2024-04-29 NOTE — NURSING NOTE
"Jennifer Venegas is a 84 year old female who presents for:  Chief Complaint   Patient presents with    RECHECK     She has been pretty stable since her last visit - son says she feels more stable        Initial Vitals:  BP (!) 151/79   Pulse 96   Wt 78 kg (172 lb)   SpO2 95%   BMI 29.52 kg/m   Estimated body mass index is 29.52 kg/m  as calculated from the following:    Height as of 4/24/24: 1.626 m (5' 4\").    Weight as of this encounter: 78 kg (172 lb).. Body surface area is 1.88 meters squared. BP completed using cuff size: lauren Henry      "

## 2024-05-29 NOTE — LETTER
5/29/2024        RE: Jennifer Venegas  C/o Jana Lin  [address] [facility] [place] [address]        [facility]  [place] [facility] Record Number:  [id number]  Place of Service where encounter took place:  [facility] [[id number]]  Chief Complaint   Patient presents with     Assisted Living Acute       HPI:    Jennifer Venegas  is a 84 year old (1940), who is being seen today for an episodic care visit.  HPI information obtained from: facility chart records, facility staff, patient report, and [facility] chart review. Today's concern is:    Seen today for recheck of chronic conditions. Unfortunately had episode of diarrhea overnight. Said her stomach is a bit upset today. She has a hx of bowel constipation, hemorrhoids and hope she is not have liquid stool around fecal impaction. She would like medication for hemorrhoids. Reviewed BRAT diet today. Donepezil reduced per neurology. Senna S 1 tab po 3x weekly and fiber administered by staff.     PT will discharge. Not making much gains, refuses/declines at times with visits.     Past Medical and Surgical History reviewed in Epic today.    MEDICATIONS:    Current Outpatient Medications   Medication Sig Dispense Refill     Calcium Polycarbophil (FIBER) 625 MG tablet Take 2 tablets (1,250 mg) by mouth daily       hydrocortisone, Perianal, (HYDROCORTISONE) 2.5 % cream Place rectally 2 times daily as needed for hemorrhoids Ok to keep bedside 30 g 11     nystatin (MYCOSTATIN) 083231 UNIT/GM external powder Apply topically 2 times daily Apply under breast twice daily for rash flare until healed. Use as directed with any rash flare. Ok to keep bedside. 30 g 11     SENNA-docusate sodium (SENNA S) 8.6-50 MG tablet Take 1 tablet by mouth three times a week May have 1 tablet po daily PRN       acetaminophen (TYLENOL) 500 MG tablet Take 2 tablets (1,000 mg) by mouth 2 times daily. May also take 2 tablets (1,000  "mg) daily as needed for mild pain or fever. 264 tablet 11     apixaban ANTICOAGULANT (ELIQUIS) 5 MG tablet Take 1 tablet (5 mg) by mouth 2 times daily       carbidopa-levodopa (SINEMET CR)  MG CR tablet Take 1 tablet by mouth at bedtime At 7pm       carbidopa-levodopa (SINEMET CR)  MG CR tablet Take 1 tablet by mouth at bedtime       carbidopa-levodopa (SINEMET)  MG tablet Take 1 tablet by mouth 3 times daily       donepezil (ARICEPT) 5 MG tablet Take 1 tablet (5 mg) by mouth at bedtime 90 tablet 3     entacapone (COMTAN) 200 MG tablet Take 1 tablet (200 mg) by mouth 3 times daily 270 tablet 3     metroNIDAZOLE (METROGEL) 1 % external gel Apply topically daily To forehead and face until resolved.  Avoid contact with eyes. Ok to keep in room 60 g 4     REVIEW OF SYSTEMS:  Limited secondary to cognitive impairment but today pt reports ok    Objective:  BP (!) 143/86   Pulse 95   Resp 16   Ht 1.626 m (5' 4\")   Wt 78.7 kg (173 lb 9.6 oz)   SpO2 96%   BMI 29.80 kg/m    Exam:  GENERAL APPEARANCE:  Alert, in no distress  ENT:  Mouth and posterior oropharynx normal, moist mucous membranes  EYES:  EOM, conjunctivae, lids, pupils and irises normal  RESP:  lungs clear to auscultation , diminished breath sounds throughout, fine crackles posterior   CV:  Palpation and auscultation of heart done , regular rate and irregular rhythm, no murmur, rub, or gallop, trace edema R>L 2+ pitting to ankles now 1+ to legs, ankles and feet-compression off  ABDOMEN:  bowel sounds hypoactive   M/S:   ambulates with 4WW  SKIN: slight redness under breasts  NEURO:   Cranial nerves 2-12 are normal tested and grossly at patient's baseline  PSYCH:  insight and judgement impaired, memory impaired    Labs:   CBC RESULTS:   Recent Labs   Lab Test 05/02/24  0700 10/27/18  1153   WBC 5.8  --    RBC 4.62  --    HGB 13.5 12.3   HCT 43.2  --    MCV 94  --    MCH 29.2  --    MCHC 31.3*  --    RDW 13.9  --      --        Last " "Basic Metabolic Panel:  Recent Labs   Lab Test 05/02/24  0700      POTASSIUM 4.0   CHLORIDE 101   SHANNON 9.6   CO2 21*   BUN 15.3   CR 0.58   *       Liver Function Studies -   Recent Labs   Lab Test 05/02/24  0700   PROTTOTAL 6.8   ALBUMIN 4.2   BILITOTAL 0.3   ALKPHOS 72   AST 12   ALT 6       TSH   Date Value Ref Range Status   05/02/2024 2.09 0.30 - 4.20 uIU/mL Final       No results found for: \"A1C\"    ASSESSMENT/PLAN:  (R21) Rash  (primary encounter diagnosis)  Comment: under breasts   Plan:   -nystatin (MYCOSTATIN) 968605 UNIT/GM external         powder          (K64.4) External hemorrhoids  Comment: ongoing   Plan:   -hydrocortisone, Perianal, (HYDROCORTISONE) 2.5         % cream          (K59.01) Slow transit constipation  Comment: intermittent diarrhea-she is unclear of medications given by facility   Plan:   -SENNA-docusate sodium (SENNA S) 8.6-50 MG 1 tab po 3x weekly and daily prn  -fiber supplement daily                        Electronically signed by:  NAREN Rothman CNP             Sincerely,        NAREN Rothman CNP      "

## 2024-05-29 NOTE — LETTER
Jennifer Venegas orders:     Begin hydrocortisone, Perianal, (HYDROCORTISONE) 2.5 % cream Place rectally 2 times daily as needed for hemorrhoids Ok to keep bedside    Begin  nystatin (MYCOSTATIN) 335067 UNIT/GM external powder Apply topically 2 times daily Apply under breast twice daily for rash flare until healed. Use as directed with any rash flare. Ok to keep bedside.         NAREN Rothman CNP on 5/29/2024 at 5:00 PM

## 2024-05-29 NOTE — PROGRESS NOTES
[place] [facility]  [place] Medical Record Number:  [id number]  Place of Service where encounter took place:  [facility] [[id number]]  Chief Complaint   Patient presents with    Assisted Living Acute       HPI:    Jennifer Venegas  is a 84 year old (1940), who is being seen today for an episodic care visit.  HPI information obtained from: facility chart records, facility staff, patient report, and [facility] chart review. Today's concern is:    Seen today for recheck of chronic conditions. Unfortunately had episode of diarrhea overnight. Said her stomach is a bit upset today. She has a hx of bowel constipation, hemorrhoids and hope she is not have liquid stool around fecal impaction. She would like medication for hemorrhoids. Reviewed BRAT diet today. Donepezil reduced per neurology. Senna S 1 tab po 3x weekly and fiber administered by staff.     PT will discharge. Not making much gains, refuses/declines at times with visits.     Past Medical and Surgical History reviewed in Epic today.    MEDICATIONS:    Current Outpatient Medications   Medication Sig Dispense Refill    Calcium Polycarbophil (FIBER) 625 MG tablet Take 2 tablets (1,250 mg) by mouth daily      hydrocortisone, Perianal, (HYDROCORTISONE) 2.5 % cream Place rectally 2 times daily as needed for hemorrhoids Ok to keep bedside 30 g 11    nystatin (MYCOSTATIN) 101349 UNIT/GM external powder Apply topically 2 times daily Apply under breast twice daily for rash flare until healed. Use as directed with any rash flare. Ok to keep bedside. 30 g 11    SENNA-docusate sodium (SENNA S) 8.6-50 MG tablet Take 1 tablet by mouth three times a week May have 1 tablet po daily PRN      acetaminophen (TYLENOL) 500 MG tablet Take 2 tablets (1,000 mg) by mouth 2 times daily. May also take 2 tablets (1,000 mg) daily as needed for mild pain or fever. 264 tablet 11    apixaban ANTICOAGULANT (ELIQUIS) 5 MG tablet Take 1 tablet (5 mg)  "by mouth 2 times daily      carbidopa-levodopa (SINEMET CR)  MG CR tablet Take 1 tablet by mouth at bedtime At 7pm      carbidopa-levodopa (SINEMET CR)  MG CR tablet Take 1 tablet by mouth at bedtime      carbidopa-levodopa (SINEMET)  MG tablet Take 1 tablet by mouth 3 times daily      donepezil (ARICEPT) 5 MG tablet Take 1 tablet (5 mg) by mouth at bedtime 90 tablet 3    entacapone (COMTAN) 200 MG tablet Take 1 tablet (200 mg) by mouth 3 times daily 270 tablet 3    metroNIDAZOLE (METROGEL) 1 % external gel Apply topically daily To forehead and face until resolved.  Avoid contact with eyes. Ok to keep in room 60 g 4     REVIEW OF SYSTEMS:  Limited secondary to cognitive impairment but today pt reports ok    Objective:  BP (!) 143/86   Pulse 95   Resp 16   Ht 1.626 m (5' 4\")   Wt 78.7 kg (173 lb 9.6 oz)   SpO2 96%   BMI 29.80 kg/m    Exam:  GENERAL APPEARANCE:  Alert, in no distress  ENT:  Mouth and posterior oropharynx normal, moist mucous membranes  EYES:  EOM, conjunctivae, lids, pupils and irises normal  RESP:  lungs clear to auscultation , diminished breath sounds throughout, fine crackles posterior   CV:  Palpation and auscultation of heart done , regular rate and irregular rhythm, no murmur, rub, or gallop, trace edema R>L 2+ pitting to ankles now 1+ to legs, ankles and feet-compression off  ABDOMEN:  bowel sounds hypoactive   M/S:   ambulates with 4WW  SKIN: slight redness under breasts  NEURO:   Cranial nerves 2-12 are normal tested and grossly at patient's baseline  PSYCH:  insight and judgement impaired, memory impaired    Labs:   CBC RESULTS:   Recent Labs   Lab Test 05/02/24  0700 10/27/18  1153   WBC 5.8  --    RBC 4.62  --    HGB 13.5 12.3   HCT 43.2  --    MCV 94  --    MCH 29.2  --    MCHC 31.3*  --    RDW 13.9  --      --        Last Basic Metabolic Panel:  Recent Labs   Lab Test 05/02/24  0700      POTASSIUM 4.0   CHLORIDE 101   SHANNON 9.6   CO2 21*   BUN 15.3   CR " "0.58   *       Liver Function Studies -   Recent Labs   Lab Test 05/02/24  0700   PROTTOTAL 6.8   ALBUMIN 4.2   BILITOTAL 0.3   ALKPHOS 72   AST 12   ALT 6       TSH   Date Value Ref Range Status   05/02/2024 2.09 0.30 - 4.20 uIU/mL Final       No results found for: \"A1C\"    ASSESSMENT/PLAN:  (R21) Rash  (primary encounter diagnosis)  Comment: under breasts   Plan:   -nystatin (MYCOSTATIN) 281039 UNIT/GM external         powder          (K64.4) External hemorrhoids  Comment: ongoing   Plan:   -hydrocortisone, Perianal, (HYDROCORTISONE) 2.5         % cream          (K59.01) Slow transit constipation  Comment: intermittent diarrhea-she is unclear of medications given by facility   Plan:   -SENNA-docusate sodium (SENNA S) 8.6-50 MG 1 tab po 3x weekly and daily prn  -fiber supplement daily                        Electronically signed by:  NAREN Rothman CNP           "

## 2024-05-31 NOTE — Clinical Note
5/31/2024       RE: Jennifer Venegas  C/o Jana Lin  [address] [facility] [address]     Dear Colleague,    Thank you for referring your patient, Jennifer Venegas, to the [facility] [place] at [facility]. Please see a copy of my visit note below.    Medication Therapy Management (MTM) Encounter    ASSESSMENT:                            Medication Adherence/Access: {adherencechoices:731719}    ***:   ***    PLAN:                            ***    Follow-up: {followuptest2:604456}    SUBJECTIVE/OBJECTIVE:                          Jennifer Venegas is a 84 year old female { :684411} for {mtmvisit:112122}     Reason for visit: ***.    Allergies/ADRs: {1/2/3/4/5:391392}  Past Medical History: {1/2/3/4/5:510452}  Tobacco: She reports that she has never smoked. She has never used smokeless tobacco.  Alcohol: {ALCOHOL CONSUMPTION HX:740214}  {Social and Goals:584033}  Medication Adherence/Access: {fumedadherence:871663}    {MTM SUBJECTIVE (Optional):863621}    Wearing off:  Taking one hour to kick in and then works for about two hours and then wearing off one hour prior to her next dose (she is currently taking doses every four hours). Her left foot seems to become more immobile before her right foot.   Freezing of gait: Yes - happens frequently when walking. Usually when first getting up and with turning.   Tremor: Tremor is constant - only in the right hand. Maybe a little worse than since she was last seen. Maybe a higher amplitude.  Rigidity: Stable from prior  Bradykinesia: Stable from prior        Parkinson's Disease:   Patient recently saw Neurology on 4/29/24, at which time:    - Add Entacapone 200 mg TID to doses of daytime CD/LD IR  Related Medications 7am 11am 3pm 7pm   Carbidopa/Levodopa  mg 1 1 1     Entacapone 200 mg  1 1 1     Carbidopa/Levodopa  mg CR       1   Carbidopa//Levodopa  mg CR      "  1   - Decrease Donepezil to 5 mg to see if this improves diarrhea  - If entacapone does not provide sufficient benefit, would consider Rytary  - MTM pharmacy referral  - Encouraged Ms. Venegas to try get consistent exercise              Today's Vitals: There were no vitals taken for this visit.  ----------------  {CONSUELO?:715379}    I spent {mtm total time 3:769452} with this patient today{MTMpartdbillingquestion:341944}. { :960314}. A copy of the visit note was provided to the patient's provider(s).    A summary of these recommendations {GIVEN/NOT GIVEN:254738}.    ***    Telemedicine Visit Details  Type of service:  {telemedvisitmtm:744561::\"Telephone visit\"}  Start Time: {video/phone visit start time:152948}  End Time: {video/phone visit end time:152948}     Medication Therapy Recommendations  No medication therapy recommendations to display     Medication Therapy Management (MTM) Encounter    ASSESSMENT:                            Medication Adherence/Access: {adherencechoices:157240}    ***:   ***    PLAN:                            ***  Interested in rytary    Follow-up: {followuptest2:736676}    SUBJECTIVE/OBJECTIVE:                          Jennifer Venegas is a 84 year old female called for an initial visit. She was referred to me from Neurology. Patient was accompanied by son, Philip.     Reason for visit: med review.    Allergies/ADRs: Reviewed in chart  Past Medical History: Reviewed in chart  Tobacco: She reports that she has never smoked. She has never used smokeless tobacco.  Alcohol: not currently using    Medication Adherence/Access: no issues reported    Wearing off:  Taking one hour to kick in and then works for about two hours and then wearing off one hour prior to her next dose (she is currently taking doses every four hours). Her left foot seems to become more immobile before her right foot.   Freezing of gait: Yes - happens frequently when walking. Usually when first getting up and with turning. " "  Tremor: Tremor is constant - only in the right hand. Maybe a little worse than since she was last seen. Maybe a higher amplitude.  Rigidity: Stable from prior  Bradykinesia: Stable from prior        Parkinson's Disease:   Patient recently saw Neurology on 4/29/24, at which time:    - Add Entacapone 200 mg TID to doses of daytime CD/LD IR  Related Medications 7am 11am 3pm 7pm   Carbidopa/Levodopa  mg 1 1 1     Entacapone 200 mg  1 1 1     Carbidopa/Levodopa  mg CR       1   Carbidopa//Levodopa  mg CR       1   - Decrease Donepezil to 5 mg to see if this improves diarrhea  - If entacapone does not provide sufficient benefit, would consider [facility] pharmacy referral  - Encouraged Ms. Venegas to try get consistent exercise    Today, she reported being unsure if entacapone has been helpful. She is feeling best about an hour after the dose and feels \"really icky\" in the hour before the dose. Describes this wearing off as \"very exhausted.\" Uses a walker all the time. States she can't tell if her mobility is any different since she uses the walker all the time.    She lays down right after 7pm doses and falls asleep around 8pm. She is awake a few times during the night. Someone comes in to help change her diaper during the night and often has trouble getting back to sleep. She is \"kind of stiff\" during the night, but doesn't think it keeps her awake.   States that she \"fired\" the physical therapist because she is not going to do the exercises, \"so why pay someone to do it.\"            Pain:    -acetaminophen 1000mg BID    Constipation/hemorrhoid:  -senna/docusate   -fiber 1250mg daily  -ho    BM frequency: has been daily, diarrhea has resolved  Straining: {YES/NO:32856}  Water intake: ***    Rash:    -nystatin powder      Afib:    -Eliquis 5mg BID    Seems to bruise to easily, but not too bad                         Today's Vitals: There were no vitals taken for this " "visit.  ----------------  {CONSUELO?:114632}    I spent {mt total time 3:240639} with this patient today{MTMpartdbillingquestion:589237}. { :376345}. A copy of the visit note was provided to the patient's provider(s).    A summary of these recommendations {GIVEN/NOT GIVEN:813675}.    ***    Telemedicine Visit Details  Type of service:  [facility]::\"Telephone visit\"}  Start Time: {video/phone visit start time:152948}  End Time: {video/phone visit end time:152948}     Medication Therapy Recommendations  No medication therapy recommendations to display       Again, thank you for allowing me to participate in the care of your patient.      Sincerely,    Marcia Armas, PharmD    "

## 2024-05-31 NOTE — PROGRESS NOTES
Medication Therapy Management (MTM) Encounter    ASSESSMENT:                            Medication Adherence/Access: No issues identified    Parkinson's Disease:   Loose stools improved with donepezil reduction. Will continue for now and consider ongoing use at future appointment.   She has not noticed any benefit since starting entacapone and is interested in pursuing Rytary. First determining cost and whether she would be eligible for assistance, if expensive.   Encouraged her to do even a small amount of exercise every day, even if she does not want to work with physical therapy.     Currently taking levodopa 1050mg per day and will start by switching to Rytary 195mg- 3 capsules three times daily. Could also consider taking Rytary 245mg - 2 capsules four times daily.     Pain:    Stable. Continue current medication.    Constipation/hemorrhoid:  Loose stools improved since reducing donepezil. Hopefully more consistent BMs will help with hemorrhoids, but will also start using the cream as needed.    Rash:    Will start using medication soon.    Afib:    Stable. Continue current medication.    PLAN:                            -I will send prescription for Rytary 195mg--3 capsules three times daily (7am, 1pm, 7pm to start). We will determine cost and then decide if you want to switch.    Follow-up: MTM 7/12/24  Neurology 8/5/24    SUBJECTIVE/OBJECTIVE:                          Jennifer Venegas is a 84 year old female called for an initial visit. She was referred to me from Neurology. Patient was accompanied by son, Philip.     Reason for visit: med review.    Allergies/ADRs: Reviewed in chart  Past Medical History: Reviewed in chart  Tobacco: She reports that she has never smoked. She has never used smokeless tobacco.  Alcohol: not currently using    Medication Adherence/Access: no issues reported    Parkinson's Disease:   Patient recently saw Neurology on 4/29/24, at which time:    - Add Entacapone 200 mg TID to doses of  "daytime CD/LD IR  Related Medications 7am 11am 3pm 7pm   Carbidopa/Levodopa  mg 1 1 1     Entacapone 200 mg  1 1 1     Carbidopa/Levodopa  mg CR       1   Carbidopa//Levodopa  mg CR       1   - Decrease Donepezil to 5 mg to see if this improves diarrhea  - If entacapone does not provide sufficient benefit, would consider [facility] referral  - Encouraged Ms. Venegas to try get consistent exercise    Per that note:  Wearing off:  Taking one hour to kick in and then works for about two hours and then wearing off one hour prior to her next dose (she is currently taking doses every four hours). Her left foot seems to become more immobile before her right foot.   Freezing of gait: Yes - happens frequently when walking. Usually when first getting up and with turning.   Tremor: Tremor is constant - only in the right hand. Maybe a little worse than since she was last seen. Maybe a higher amplitude.  Rigidity: Stable from prior  Bradykinesia: Stable from prior    Today, she reported being unsure if entacapone has been helpful. She is feeling best about an hour after the dose and feels \"really icky\" in the hour before the dose. Describes wearing off as \"feeling very exhausted.\" States she can't tell if her mobility is any different since she uses the walker all the time.    She lays down right after 7pm doses and falls asleep around 8pm. She is awake a few times during the night. Someone comes in to help change her diaper during the night and often has trouble getting back to sleep. She is \"kind of stiff\" during the night, but doesn't think it keeps her awake.   States that she \"fired\" the physical therapist because she is not going to do the exercises anyway, \"so why pay someone to tell me to do it.\"  States that it is very difficult to get to the bathroom in the morning, difficult with moving and changing positions due to stiffness    Pain:    -acetaminophen 1000mg BID  Works well. No " issues.    Constipation/hemorrhoid:  -senna/docusate daily if needed  -fiber 1250mg daily  -hydrocortisone cream as needed for hemorrhoids (just prescribed, hasn't received yet)    BM frequency: has been daily, diarrhea has resolved since reducing donepezil  Straining: No    Rash:    -nystatin powder BID (recently prescribed, not yet received)  Rash under breast.    Afib:    -Eliquis 5mg BID    Seems to bruise a bit easily on her legs, but otherwise well tolerated. Not too bothersome. No concerns.    ---------------    I spent 45 minutes with this patient today. All changes were made via collaborative practice agreement with Tanvi Lindquist. A copy of the visit note was provided to the patient's provider(s).    A summary of these recommendations was sent via clinic portal.    Marcia Armas, PharmD  Medication Therapy Management Pharmacist  [facility]      Telemedicine Visit Details  Type of service:  Telephone visit  Start Time:  11:00am  End Time:  11:45am     Medication Therapy Recommendations  Parkinson's disease (H)    Current Medication: carbidopa-levodopa (SINEMET CR)  MG CR tablet   Rationale: More effective medication available - Ineffective medication - Effectiveness   Recommendation: Change Medication - switch to Rytary   Status: Accepted per CPA

## 2024-05-31 NOTE — Clinical Note
No change with entacapone and she would like to try Rytary. I am sending Rx and will see if covered.

## 2024-06-01 NOTE — PATIENT INSTRUCTIONS
"Recommendations from today's MTM visit:                                                    MTM (medication therapy management) is a service provided by a clinical pharmacist designed to help you get the most of out of your medicines.   Today we reviewed what your medicines are for, how to know if they are working, that your medicines are safe and how to make your medicine regimen as easy as possible.      I sent prescription for Rytary, which will need a prior authorization through insurance, which means they have to ensure that the medication would beneficial for you. If it is covered by insurance, it might still be expensive, then we will look into copay assistance options. You do not need to anything and I will let you know when I get any updates. This process could take several weeks.    Follow-up: 7/12/24    It was great speaking with you today.  I value your experience and would be very thankful for your time in providing feedback in our clinic survey. In the next few days, you may receive an email or text message from [employer] with a link to a survey related to your  clinical pharmacist.\"     To schedule another MTM appointment, please call the clinic directly or you may call the MTM scheduling line at [phone].    My Clinical Pharmacist's contact information:                                                      Please feel free to contact me with any questions or concerns you have.      Marcia Armas, PharmD  Medication Therapy Management Pharmacist  [facility]    "

## 2024-06-15 NOTE — ED PROVIDER NOTES
Emergency Department Note      History of Present Illness     Chief Complaint  Altered Mental Status (From [facility] - over 24 hours )    CHANELLE Venegas is a 84 year old female with a history of Parkinson's and on anticoagulants who presents due to confusion, Over the past day or two, patient's staff at [facility] noticed she has been experiencing increased acute confusion. For example, she doesn't know her surroundings and confuses where she is. At around 1500 today, patient received her medications, stating that she doesn't take any pills. [facility] staff wanted to take a urine sample because they were wondering about a UTI, but she has refused to comply. At this time, they called her son and said they needed to bring her to the ER to see if there have been any differences in the past 48 hours. Jennifer's son notes that her medications are constantly being tweaked, with the latest one being donepezil being reduced from 5 mg 1x a day to 5 mg every other day. Her son also notes that today, patient appears more confused than normal. Patient has Parkinson's and her levodopa medication has become less effective. The last hour of time before her next dose is particularly challenging. She gets difficulty with walking and lapses with confusion. Patient denies any recent falls/injury, appetite issues, or major sleep issues. Patient's son also notes that she has hemorrhoids. Patient notes she might not be drinking enough water due to her mobility issues. They deny any other symptoms. No known fevers, URI symptoms, cough, shortness of breath, chest pain, abdominal pain.     Independent Historian  Son and patient as detailed above.    Review of External Notes  I reviewed the Pharm.D. med management note from 5/31/2024:  At 7 PM patient taking total of  mg of Sinemet CR.    Past Medical History   Medical History and Problem List  Past Medical History:   Diagnosis Date    Acne rosacea     Anemia 1989    Arthritis     Basal  "cell carcinoma     Cataract, unspecified cataract type, unspecified laterality     Cervicalgia     Degeneration of cervical intervertebral disc 06/1999    Hearing loss     Hyperlipidemia     Lumbago     Osteoarthrosis     Pain in right shoulder     Parkinson's disease (H)     Resting tremor     Rhinitis, allergic     Right leg weakness     Varicose vein of leg        Medications  acetaminophen (TYLENOL) 500 MG tablet  apixaban ANTICOAGULANT (ELIQUIS) 5 MG tablet  calcium polycarbophil (FIBERCON) 625 MG tablet  carbidopa-levodopa (RYTARY) 48. MG CPCR per CR capsule  carbidopa-levodopa (SINEMET CR)  MG CR tablet  carbidopa-levodopa (SINEMET CR)  MG CR tablet  carbidopa-levodopa (SINEMET)  MG tablet  donepezil (ARICEPT) 5 MG tablet  entacapone (COMTAN) 200 MG tablet  hydrocortisone 2.5 % cream  hydrocortisone, Perianal, (HYDROCORTISONE) 2.5 % cream  metroNIDAZOLE (METROGEL) 1 % external gel  nystatin (MYCOSTATIN) 309395 UNIT/GM external powder  SENNA-docusate sodium (SENNA S) 8.6-50 MG tablet        Surgical History   Past Surgical History:   Procedure Laterality Date    APPENDECTOMY  1964    COLONOSCOPY  10/31/2006    EYE SURGERY  10/05/2011    blepharoplasty    GYN SURGERY  1974    Ligate fallopian tube    HERNIA REPAIR  1964    Incisional hernia, reducible    ORTHOPEDIC SURGERY Right 2005    arthroscopy of joint shoulder    ORTHOPEDIC SURGERY Left 04/10/2017    knee arthroplasty    REVERSE ARTHROPLASTY SHOULDER Right 10/25/2018    Procedure: RIGHT REVERSE TOTAL SHOULDER ARTHROPLASTY;  Surgeon: Edd Currie MD;  Location: [facility] OR    SIGMOIDOSCOPY FLEXIBLE  10/2000     Physical Exam   Patient Vitals for the past 24 hrs:   BP Temp Temp src Pulse Resp SpO2 Height Weight   06/15/24 1800 139/79 -- -- 89 -- 95 % -- --   06/15/24 1730 (!) 140/80 -- -- 90 -- 95 % -- --   06/15/24 1645 (!) 149/80 97.5  F (36.4  C) Temporal 76 16 96 % 1.651 m (5' 5\") 68 kg (150 lb)     Physical " Exam  General: Elderly. Nontoxic. Resting comfortably  Head:  Scalp, face, and head appear normal  Eyes:  Pupils are equal, round, reactive to light EOMI, no nystagmus.     Conjunctivae non-injected and sclerae white  ENT:    The external nose is normal    Pinnae are normal  Neck:  Normal range of motion    There is no rigidity noted    Trachea is in the midline  CV:  Regular rate and rhythm     Normal S1/S2, no S3/S4    No murmur or rub. Radial pulses 2+ bilaterally.  Resp:  Lungs are clear and equal bilaterally  There is no tachypnea    No increased work of breathing    No rales, wheezing, or rhonchi  GI:  Abdomen is soft, no rigidity or guarding    No distension, or mass    No tenderness or rebound tenderness   MS:  Normal muscular tone    Symmetric motor strength    No lower extremity edema  Skin:  No rash or acute skin lesions noted  Neuro:  A&Ox3, GCS 15    CN II - XII intact    Speech clear, fluent, and normal    Strength 5/5 and symmetric in bilateral upper and lower extremities.    No pronator drift. No leg drift. SILT throughout.    No ankle clonus    FTN testing normal. No tremor.     No meningismus   Psych: Normal affect. Appropriate interactions.    Diagnostics   Lab Results   Labs Ordered and Resulted from Time of ED Arrival to Time of ED Departure   BASIC METABOLIC PANEL - Abnormal       Result Value    Sodium 138      Potassium 3.7      Chloride 101      Carbon Dioxide (CO2) 25      Anion Gap 12      Urea Nitrogen 10.8      Creatinine 0.52      GFR Estimate >90      Calcium 9.7      Glucose 122 (*)    ROUTINE UA WITH MICROSCOPIC REFLEX TO CULTURE - Abnormal    Color Urine Yellow      Appearance Urine Slightly Cloudy (*)     Glucose Urine Negative      Bilirubin Urine Negative      Ketones Urine 10 (*)     Specific Gravity Urine 1.023      Blood Urine Small (*)     pH Urine 6.0      Protein Albumin Urine 10 (*)     Urobilinogen Urine Normal      Nitrite Urine Negative      Leukocyte Esterase Urine  Trace (*)     Mucus Urine Present (*)     RBC Urine 1      WBC Urine 2      Squamous Epithelials Urine 1     CBC WITH PLATELETS AND DIFFERENTIAL    WBC Count 4.6      RBC Count 4.61      Hemoglobin 14.0      Hematocrit 43.0      MCV 93      MCH 30.4      MCHC 32.6      RDW 13.4      Platelet Count 231      % Neutrophils 67      % Lymphocytes 24      % Monocytes 8      % Eosinophils 1      % Basophils 0      % Immature Granulocytes 0      NRBCs per 100 WBC 0      Absolute Neutrophils 3.1      Absolute Lymphocytes 1.1      Absolute Monocytes 0.4      Absolute Eosinophils 0.1      Absolute Basophils 0.0      Absolute Immature Granulocytes 0.0      Absolute NRBCs 0.0         Imaging  No orders to display         Independent Interpretation  None  ED Course    Medications Administered  Medications - No data to display    Procedures  Procedures     Discussion of Management  None    Social Determinants of Health adding to complexity of care  None    ED Course  ED Course as of 06/16/24 1712   Sat Billy 15, 2024   1849 I obtained history and examined the patient as noted above.   2105 I explained findings to the patient and we discussed plan for discharge. The patient is comfortable with this plan.       Medical Decision Making / Diagnosis   CMS Diagnoses: None    MIPS       MDM  Jennifer Venegas is a 84 year old female with history of Parkinson's disease presented to the emergency department with increased confusion.  On my evaluation she is elderly and frail but nontoxic.  No focal neurologic deficits.  She is conversant and follows commands appropriately.  Clinically she appeared to be mild to moderately dehydrated.  ED evaluation was otherwise reassuring.  No clinical evidence to suggest stroke.  No headache or focal neurologic deficits.  Urinalysis is very concentrated but negative for UTI.  CBC and BMP is unremarkable.  No severe hyperglycemia or hypoglycemia.  Episode of confusion was likely worsened by dehydration.   Patient does not really able to get access to any liquids apart from mealtimes.  She was treated with IV fluids.  No evidence of any other infectious process clinically at this time.  No recent trauma or falls.  No indication for admission to the hospital at this time.  Patient to continue her normal medication regimen.  I stressed the importance of increasing oral fluid intake at home and close follow-up with her primary care physician.  This return to the hospital for any worsening.  Patient was discharged in stable and improved condition.    Disposition  The patient was discharged.     ICD-10 Codes:    ICD-10-CM    1. Dehydration  E86.0       2. Confusion  R41.0            Discharge Medications  New Prescriptions    No medications on file     Scribe Disclosure:  I, Micaela Alva, am serving as a scribe at 6:34 PM on 6/15/2024 to document services personally performed by Ovidio Cervantes MD, based on my observations and the provider's statements to me.        Ovidio Cervantes MD  06/16/24 1870

## 2024-06-15 NOTE — ED NOTES
"Pt cooperative with obtaining a urine sample and other interventions. States \"whatever you want\" when asked. Answers orientation questions appropriately.   "

## 2024-06-16 NOTE — TELEPHONE ENCOUNTER
Jennifer Venegas is a 84 year old  (1940), Nurse called today to report: increased confusion and urinary frequency Send U/A U/C, VSS       Electronically signed by:   Meliza Jarrell CNP

## 2024-06-18 NOTE — PROGRESS NOTES
[facility]  [facility] Medical Record Number:  [id number]  Place of Service where encounter took place:  [facility] [[id number]]  Chief Complaint   Patient presents with    Assisted Living Acute     Emergency Department follow up       HPI:    Jennifer Vengeas  is a 84 year old (1940), who is being seen today for an episodic care visit.  HPI information obtained from: facility chart records, facility staff, patient report, and [facility] chart review. Today's concern is:    Follow up to ED visit for increased confusion, refusing cares and medications. Workup mostly negative for acute findings but noted with some dehydration that could be contributor to presentation. History of refusing cares, not calling for assistance. Recent medication change was reduction to Donepezil by neurology with SE of diarrhea. Wondering if in addition to dehydration caused marked decline in cognition. Today continues to appear confused above baseline. Thinks she was just at the facility for a vacation and upset that she will be moving here. Later staff escorting her back to apartment since she has no idea where it is.     Past Medical and Surgical History reviewed in Epic today.    MEDICATIONS:    Current Outpatient Medications   Medication Sig Dispense Refill    acetaminophen (TYLENOL) 500 MG tablet Take 2 tablets (1,000 mg) by mouth 2 times daily. May also take 2 tablets (1,000 mg) daily as needed for mild pain or fever. 264 tablet 11    apixaban ANTICOAGULANT (ELIQUIS) 5 MG tablet Take 1 tablet (5 mg) by mouth 2 times daily      calcium polycarbophil (FIBERCON) 625 MG tablet Take 2 tablets (1,250 mg) by mouth daily      carbidopa-levodopa (RYTARY) 48. MG CPCR per CR capsule Take 3 capsules by mouth 3 times daily 270 capsule 3    carbidopa-levodopa (SINEMET CR)  MG CR tablet Take 1 tablet by mouth at bedtime At 7pm      carbidopa-levodopa (SINEMET CR)  MG CR tablet  "Take 1 tablet by mouth at bedtime      carbidopa-levodopa (SINEMET)  MG tablet Take 1 tablet by mouth 3 times daily      donepezil (ARICEPT) 5 MG tablet Take 1 tablet (5 mg) by mouth at bedtime 90 tablet 3    entacapone (COMTAN) 200 MG tablet Take 1 tablet (200 mg) by mouth 3 times daily 270 tablet 3    hydrocortisone 2.5 % cream Apply topically daily as needed for other (hemorrhoids)      hydrocortisone, Perianal, (HYDROCORTISONE) 2.5 % cream Place rectally 2 times daily as needed for hemorrhoids Ok to keep bedside 30 g 11    metroNIDAZOLE (METROGEL) 1 % external gel Apply topically daily To forehead and face until resolved.  Avoid contact with eyes. Ok to keep in room 60 g 4    nystatin (MYCOSTATIN) 015938 UNIT/GM external powder Apply topically 2 times daily Apply under breast twice daily for rash flare until healed. Use as directed with any rash flare. Ok to keep bedside. 30 g 11    SENNA-docusate sodium (SENNA S) 8.6-50 MG tablet Take 1 tablet by mouth three times a week May have 1 tablet po daily PRN       REVIEW OF SYSTEMS:  Limited secondary to cognitive impairment but today pt reports ok    Objective:  BP (!) 151/84   Pulse 76   Temp 97.5  F (36.4  C)   Resp 18   Ht 1.651 m (5' 5\")   Wt 78.7 kg (173 lb 9.6 oz)   SpO2 94%   BMI 28.89 kg/m    Exam:  GENERAL APPEARANCE:  Alert, in no distress, confused   ENT:  Mouth and posterior oropharynx normal, moist mucous membranes  EYES:  EOM, conjunctivae, lids, pupils and irises normal  RESP:  lungs clear to auscultation , diminished breath sounds throughout, fine crackles posterior   CV:  Palpation and auscultation of heart done , regular rate and irregular rhythm, no murmur, rub, or gallop, trace edema R>L 2+ pitting to ankles now 1+ to legs, ankles and feet-compression on  ABDOMEN:  bowel sounds hypoactive   M/S:   ambulates with 4WW  SKIN: slight redness under breasts  NEURO:   Cranial nerves 2-12 are normal tested and grossly at patient's " "baseline  PSYCH:  insight and judgement impaired, memory impaired    Labs:   CBC RESULTS:   Recent Labs   Lab Test 06/15/24  1705 05/02/24  0700   WBC 4.6 5.8   RBC 4.61 4.62   HGB 14.0 13.5   HCT 43.0 43.2   MCV 93 94   MCH 30.4 29.2   MCHC 32.6 31.3*   RDW 13.4 13.9    246       Last Basic Metabolic Panel:  Recent Labs   Lab Test 06/15/24  1705 05/02/24  0700    140   POTASSIUM 3.7 4.0   CHLORIDE 101 101   SHANNON 9.7 9.6   CO2 25 21*   BUN 10.8 15.3   CR 0.52 0.58   * 178*       Liver Function Studies -   Recent Labs   Lab Test 05/02/24  0700   PROTTOTAL 6.8   ALBUMIN 4.2   BILITOTAL 0.3   ALKPHOS 72   AST 12   ALT 6       TSH   Date Value Ref Range Status   05/02/2024 2.09 0.30 - 4.20 uIU/mL Final       No results found for: \"A1C\"    ASSESSMENT/PLAN:  (G20.A2) Parkinson's disease without dyskinesia, with fluctuating manifestations (H)  (F02.BUFFY) 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

## 2024-06-19 NOTE — LETTER
Jennifer Venegas orders:     -reduced Senna S to     SENNA-docusate sodium (SENNA S) 8.6-50 MG tablet Take 1 tablet by mouth 2 times daily as needed (constipation)     Increase to    donepezil (ARICEPT) 10 MG tablet Take 1 tablet (10 mg) by mouth at bedtime           NAREN Rothman CNP on 6/19/2024 at 11:17 PM

## 2024-06-19 NOTE — LETTER
6/19/2024      Jennifer Venegas  C/o Jana Griesbach  [address] [address]        [facility]  [facility] Record Number:  [id number]  Place of Service where encounter took place:  [facility] [[id number]]  Chief Complaint   Patient presents with     Assisted Living Acute     Emergency Department follow up       HPI:    Jennifer Venegas  is a 84 year old (1940), who is being seen today for an episodic care visit.  HPI information obtained from: facility chart records, facility staff, patient report, and [facility] chart review. Today's concern is:    Follow up to ED visit for increased confusion, refusing cares and medications. Workup mostly negative for acute findings but noted with some dehydration that could be contributor to presentation. History of refusing cares, not calling for assistance. Recent medication change was reduction to Donepezil by neurology with SE of diarrhea. Wondering if in addition to dehydration caused marked decline in cognition. Today continues to appear confused above baseline. Thinks she was just at the facility for a vacation and upset that she will be moving here. Later staff escorting her back to apartment since she has no idea where it is.     Past Medical and Surgical History reviewed in Epic today.    MEDICATIONS:    Current Outpatient Medications   Medication Sig Dispense Refill     acetaminophen (TYLENOL) 500 MG tablet Take 2 tablets (1,000 mg) by mouth 2 times daily. May also take 2 tablets (1,000 mg) daily as needed for mild pain or fever. 264 tablet 11     apixaban ANTICOAGULANT (ELIQUIS) 5 MG tablet Take 1 tablet (5 mg) by mouth 2 times daily       calcium polycarbophil (FIBERCON) 625 MG tablet Take 2 tablets (1,250 mg) by mouth daily       carbidopa-levodopa (RYTARY) 48. MG CPCR per CR capsule Take 3 capsules by mouth 3 times daily 270 capsule 3     carbidopa-levodopa (SINEMET CR)  " MG CR tablet Take 1 tablet by mouth at bedtime At 7pm       carbidopa-levodopa (SINEMET CR)  MG CR tablet Take 1 tablet by mouth at bedtime       carbidopa-levodopa (SINEMET)  MG tablet Take 1 tablet by mouth 3 times daily       donepezil (ARICEPT) 5 MG tablet Take 1 tablet (5 mg) by mouth at bedtime 90 tablet 3     entacapone (COMTAN) 200 MG tablet Take 1 tablet (200 mg) by mouth 3 times daily 270 tablet 3     hydrocortisone 2.5 % cream Apply topically daily as needed for other (hemorrhoids)       hydrocortisone, Perianal, (HYDROCORTISONE) 2.5 % cream Place rectally 2 times daily as needed for hemorrhoids Ok to keep bedside 30 g 11     metroNIDAZOLE (METROGEL) 1 % external gel Apply topically daily To forehead and face until resolved.  Avoid contact with eyes. Ok to keep in room 60 g 4     nystatin (MYCOSTATIN) 719345 UNIT/GM external powder Apply topically 2 times daily Apply under breast twice daily for rash flare until healed. Use as directed with any rash flare. Ok to keep bedside. 30 g 11     SENNA-docusate sodium (SENNA S) 8.6-50 MG tablet Take 1 tablet by mouth three times a week May have 1 tablet po daily PRN       REVIEW OF SYSTEMS:  Limited secondary to cognitive impairment but today pt reports ok    Objective:  BP (!) 151/84   Pulse 76   Temp 97.5  F (36.4  C)   Resp 18   Ht 1.651 m (5' 5\")   Wt 78.7 kg (173 lb 9.6 oz)   SpO2 94%   BMI 28.89 kg/m    Exam:  GENERAL APPEARANCE:  Alert, in no distress, confused   ENT:  Mouth and posterior oropharynx normal, moist mucous membranes  EYES:  EOM, conjunctivae, lids, pupils and irises normal  RESP:  lungs clear to auscultation , diminished breath sounds throughout, fine crackles posterior   CV:  Palpation and auscultation of heart done , regular rate and irregular rhythm, no murmur, rub, or gallop, trace edema R>L 2+ pitting to ankles now 1+ to legs, ankles and feet-compression on  ABDOMEN:  bowel sounds hypoactive   M/S:   ambulates " "with 4WW  SKIN: slight redness under breasts  NEURO:   Cranial nerves 2-12 are normal tested and grossly at patient's baseline  PSYCH:  insight and judgement impaired, memory impaired    Labs:   CBC RESULTS:   Recent Labs   Lab Test 06/15/24  1705 05/02/24  0700   WBC 4.6 5.8   RBC 4.61 4.62   HGB 14.0 13.5   HCT 43.0 43.2   MCV 93 94   MCH 30.4 29.2   MCHC 32.6 31.3*   RDW 13.4 13.9    246       Last Basic Metabolic Panel:  Recent Labs   Lab Test 06/15/24  1705 05/02/24  0700    140   POTASSIUM 3.7 4.0   CHLORIDE 101 101   SHANNON 9.7 9.6   CO2 25 21*   BUN 10.8 15.3   CR 0.52 0.58   * 178*       Liver Function Studies -   Recent Labs   Lab Test 05/02/24  0700   PROTTOTAL 6.8   ALBUMIN 4.2   BILITOTAL 0.3   ALKPHOS 72   AST 12   ALT 6       TSH   Date Value Ref Range Status   05/02/2024 2.09 0.30 - 4.20 uIU/mL Final       No results found for: \"A1C\"    ASSESSMENT/PLAN:  (G20.A2) Parkinson's disease without dyskinesia, with fluctuating manifestations (H)  (F02.BUFFY) 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

## 2024-06-19 NOTE — LETTER
6/19/2024      Jennifer Venegas  C/o Jana Griesbach  [address] [facility] [address]        [facility]  [place] Medical Record Number:  [id number]  Place of Service where encounter took place:  [facility] [[id number]]  Chief Complaint   Patient presents with     Assisted Living Acute     Emergency Department follow up       HPI:    Jennifer Venegas  is a 84 year old (1940), who is being seen today for an episodic care visit.  HPI information obtained from: facility chart records, facility staff, patient report, and [facility] chart review. Today's concern is:    Follow up to ED visit for increased confusion, refusing cares and medications. Workup mostly negative for acute findings but noted with some dehydration that could be contributor to presentation. History of refusing cares, not calling for assistance. Recent medication change was reduction to Donepezil by neurology with SE of diarrhea. Wondering if in addition to dehydration caused marked decline in cognition. Today continues to appear confused above baseline. Thinks she was just at the facility for a vacation and upset that she will be moving here. Later staff escorting her back to apartment since she has no idea where it is.     Past Medical and Surgical History reviewed in Epic today.    MEDICATIONS:    Current Outpatient Medications   Medication Sig Dispense Refill     acetaminophen (TYLENOL) 500 MG tablet Take 2 tablets (1,000 mg) by mouth 2 times daily. May also take 2 tablets (1,000 mg) daily as needed for mild pain or fever. 264 tablet 11     apixaban ANTICOAGULANT (ELIQUIS) 5 MG tablet Take 1 tablet (5 mg) by mouth 2 times daily       calcium polycarbophil (FIBERCON) 625 MG tablet Take 2 tablets (1,250 mg) by mouth daily       carbidopa-levodopa (RYTARY) 48. MG CPCR per CR capsule Take 3 capsules by mouth 3 times daily 270 capsule 3     carbidopa-levodopa (SINEMET CR)  " MG CR tablet Take 1 tablet by mouth at bedtime At 7pm       carbidopa-levodopa (SINEMET CR)  MG CR tablet Take 1 tablet by mouth at bedtime       carbidopa-levodopa (SINEMET)  MG tablet Take 1 tablet by mouth 3 times daily       donepezil (ARICEPT) 5 MG tablet Take 1 tablet (5 mg) by mouth at bedtime 90 tablet 3     entacapone (COMTAN) 200 MG tablet Take 1 tablet (200 mg) by mouth 3 times daily 270 tablet 3     hydrocortisone 2.5 % cream Apply topically daily as needed for other (hemorrhoids)       hydrocortisone, Perianal, (HYDROCORTISONE) 2.5 % cream Place rectally 2 times daily as needed for hemorrhoids Ok to keep bedside 30 g 11     metroNIDAZOLE (METROGEL) 1 % external gel Apply topically daily To forehead and face until resolved.  Avoid contact with eyes. Ok to keep in room 60 g 4     nystatin (MYCOSTATIN) 152405 UNIT/GM external powder Apply topically 2 times daily Apply under breast twice daily for rash flare until healed. Use as directed with any rash flare. Ok to keep bedside. 30 g 11     SENNA-docusate sodium (SENNA S) 8.6-50 MG tablet Take 1 tablet by mouth three times a week May have 1 tablet po daily PRN       REVIEW OF SYSTEMS:  Limited secondary to cognitive impairment but today pt reports ok    Objective:  BP (!) 151/84   Pulse 76   Temp 97.5  F (36.4  C)   Resp 18   Ht 1.651 m (5' 5\")   Wt 78.7 kg (173 lb 9.6 oz)   SpO2 94%   BMI 28.89 kg/m    Exam:  GENERAL APPEARANCE:  Alert, in no distress, confused   ENT:  Mouth and posterior oropharynx normal, moist mucous membranes  EYES:  EOM, conjunctivae, lids, pupils and irises normal  RESP:  lungs clear to auscultation , diminished breath sounds throughout, fine crackles posterior   CV:  Palpation and auscultation of heart done , regular rate and irregular rhythm, no murmur, rub, or gallop, trace edema R>L 2+ pitting to ankles now 1+ to legs, ankles and feet-compression on  ABDOMEN:  bowel sounds hypoactive   M/S:   ambulates " "with 4WW  SKIN: slight redness under breasts  NEURO:   Cranial nerves 2-12 are normal tested and grossly at patient's baseline  PSYCH:  insight and judgement impaired, memory impaired    Labs:   CBC RESULTS:   Recent Labs   Lab Test 06/15/24  1705 05/02/24  0700   WBC 4.6 5.8   RBC 4.61 4.62   HGB 14.0 13.5   HCT 43.0 43.2   MCV 93 94   MCH 30.4 29.2   MCHC 32.6 31.3*   RDW 13.4 13.9    246       Last Basic Metabolic Panel:  Recent Labs   Lab Test 06/15/24  1705 05/02/24  0700    140   POTASSIUM 3.7 4.0   CHLORIDE 101 101   SHANNON 9.7 9.6   CO2 25 21*   BUN 10.8 15.3   CR 0.52 0.58   * 178*       Liver Function Studies -   Recent Labs   Lab Test 05/02/24  0700   PROTTOTAL 6.8   ALBUMIN 4.2   BILITOTAL 0.3   ALKPHOS 72   AST 12   ALT 6       TSH   Date Value Ref Range Status   05/02/2024 2.09 0.30 - 4.20 uIU/mL Final       No results found for: \"A1C\"    ASSESSMENT/PLAN:  (G20.A2) Parkinson's disease without dyskinesia, with fluctuating manifestations (H)  (F02.BUFFY) 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

## 2024-06-25 PROBLEM — R41.82 ALTERED MENTAL STATUS, UNSPECIFIED ALTERED MENTAL STATUS TYPE: Status: ACTIVE | Noted: 2024-01-01

## 2024-06-25 PROBLEM — I10 HYPERTENSION, UNSPECIFIED TYPE: Status: ACTIVE | Noted: 2024-01-01

## 2024-06-25 PROBLEM — E86.9 VOLUME DEPLETION: Status: ACTIVE | Noted: 2024-01-01

## 2024-06-25 PROBLEM — R00.0 TACHYCARDIA: Status: ACTIVE | Noted: 2024-01-01

## 2024-06-25 NOTE — ED PROVIDER NOTES
Emergency Department Note      History of Present Illness     Chief Complaint  Flu Symptoms    HPI  Jennifer Venegas is a 84 year old female on Eliquis with a history of hyperlipidemia and Parkinson's disease, who presents to the ED via EMS for flu symptoms. The patient has had a low grade fever, fatigue, and weakness for the last 4 days. The patient has been taking Tylenol for her fever. She endorses occasional diarrhea. The patient denies any cough, pain with urination, abdominal pain, or headache. She uses a walker, but has not had the strength to do that recently. She lives in [place], in an assisted living community. Of note, the patient was recently in the hospital for dehydration.     Independent Historian  EMS as detailed above.    Review of External Notes  Patient seen in the ED 6/15/2024 for similar presentation.  Past Medical History   Medical History and Problem List  Acne rosacea  Anemia  Arthritis  Basal cell carcinoma  Cataracts  Cervicalgia  Degeneration of cervical intervertebral disc  Hearing loss  Hyperlipidemia  Lumbago  Osteoarthrosis  Pain in right shoulder  Parkinson's disease   Resting tremor  Rhinitis, allergic  Right leg weakness  Varicose vein of leg  Prediabetes    Medications  Eliquis   Sinemet CR  Donepezil  Entacapone  Senna S    Surgical History   Appendectomy  Colonoscopy  Blepharoplasty  Tubal ligation  Hernia repair  Shoulder Arthroscopy  Sigmoidoscopy     Physical Exam   Patient Vitals for the past 24 hrs:   BP Temp Pulse Resp SpO2   06/25/24 1431 (!) 170/104 -- 92 17 96 %   06/25/24 1407 -- -- 99 19 96 %   06/25/24 1300 (!) 160/112 -- 98 -- 95 %   06/25/24 1259 -- -- -- 20 --   06/25/24 1201 -- -- -- 18 --   06/25/24 1200 (!) 144/89 -- 84 -- 96 %   06/25/24 1152 -- -- -- 20 --   06/25/24 1151 (!) 155/113 -- 86 -- 96 %   06/25/24 1100 -- -- 90 13 98 %   06/25/24 0838 (!) 151/89 97.9  F (36.6  C) 97 16 93 %     Physical Exam  General: chronically ill appearing, lying on gurney with  eyes closed,   HENT: mucous membranes dry  CV: regular rate, regular rhythm  Resp: normal effort, clear throughout, no crackles or wheezing  GI: abdomen soft and nontender, no guarding  MSK: no bony tenderness  Skin: appropriately warm and dry.  Wound to RLE with overlying dressing, approximately 2 x 3 cm, superficial, no surrounding erythema, no purulent discharge.  Extremities: no edema, calves non-tender  Neuro: alert, newly weak, clear speech.  Poor short-term memory.  Moving all extremities equally, strength 5 out of 5 bilateral upper and lower extremities.  Pupils equal round and reactive to light.  Symmetric smile.  Psych: normal mood and affect      Diagnostics   Lab Results   Labs Ordered and Resulted from Time of ED Arrival to Time of ED Departure   LACTIC ACID WHOLE BLOOD - Abnormal       Result Value    Lactic Acid 2.3 (*)    COMPREHENSIVE METABOLIC PANEL - Abnormal    Sodium 139      Potassium 4.1      Carbon Dioxide (CO2) 24      Anion Gap 11      Urea Nitrogen 21.6      Creatinine 0.43 (*)     GFR Estimate >90      Calcium 9.4      Chloride 104      Glucose 109 (*)     Alkaline Phosphatase 63      AST 12      ALT <5      Protein Total 6.4      Albumin 4.0      Bilirubin Total 0.3     CBC WITH PLATELETS AND DIFFERENTIAL - Abnormal    WBC Count 10.8      RBC Count 4.45      Hemoglobin 12.8      Hematocrit 40.3      MCV 91      MCH 28.8      MCHC 31.8      RDW 13.4      Platelet Count 215      % Neutrophils 79      % Lymphocytes 14      % Monocytes 7      % Eosinophils 0      % Basophils 0      % Immature Granulocytes 1      NRBCs per 100 WBC 0      Absolute Neutrophils 8.5 (*)     Absolute Lymphocytes 1.5      Absolute Monocytes 0.7      Absolute Eosinophils 0.0      Absolute Basophils 0.0      Absolute Immature Granulocytes 0.1      Absolute NRBCs 0.0     BASIC METABOLIC PANEL - Abnormal    Sodium 139      Potassium 4.1      Chloride 104      Carbon Dioxide (CO2) 24      Anion Gap 11      Urea Nitrogen  21.6      Creatinine 0.43 (*)     GFR Estimate >90      Calcium 9.4      Glucose 109 (*)    LACTIC ACID WHOLE BLOOD - Abnormal    Lactic Acid 2.1 (*)    INFLUENZA A/B, RSV, & SARS-COV2 PCR - Normal    Influenza A PCR Negative      Influenza B PCR Negative      RSV PCR Negative      SARS CoV2 PCR Negative     ROUTINE UA WITH MICROSCOPIC REFLEX TO CULTURE - Normal    Color Urine Yellow      Appearance Urine Clear      Glucose Urine Negative      Bilirubin Urine Negative      Ketones Urine Negative      Specific Gravity Urine 1.015      Blood Urine Negative      pH Urine 6.0      Protein Albumin Urine Negative      Urobilinogen Urine Normal      Nitrite Urine Negative      Leukocyte Esterase Urine Negative      RBC Urine <1      WBC Urine 1      Squamous Epithelials Urine <1      Hyaline Casts Urine 1     PROCALCITONIN - Normal    Procalcitonin 0.06     CBC WITH PLATELETS AND DIFFERENTIAL   LACTIC ACID WHOLE BLOOD   BLOOD CULTURE   BLOOD CULTURE       Imaging  Chest XR,  PA & LAT   Preliminary Result   IMPRESSION: Mild interstitial prominence throughout both lungs may be   fibrotic, although infection or mild edema could also have this   appearance. There is mild pulmonary venous congestion. Tortuous   calcified thoracic aorta. No pleural effusions. No pneumothorax. Right   shoulder arthroplasty.          EKG   none    Independent Interpretation  CXR: No pneumothorax or pleural effusion.  No obvious pneumonia.  ED Course    Medications Administered  Medications   sodium chloride 0.9% BOLUS 1,000 mL (0 mLs Intravenous Stopped 6/25/24 1139)   sodium chloride 0.9% BOLUS 1,000 mL (0 mLs Intravenous Stopped 6/25/24 1346)   hydrogen peroxide 3 % solution 1 mL (1 mL Topical Not Given 6/25/24 1130)   sodium chloride 0.9% BOLUS 1,000 mL (1,000 mLs Intravenous $New Bag 6/25/24 1346)       Procedures  Procedures     Discussion of Management  None    Social Determinants of Health adding to complexity of care  None    ED Course  ED  Course as of 06/25/24 1444   Tue Jun 25, 2024   0830 I obtained the history and examined the patient as above.    1248 I rechecked and updated the patient as above. The patient seems to be doing a lot better.    1257 I rechecked and updated the patient as above    1443 I left a message for the patient's daughter.     Medical Decision Making / Diagnosis   CMS Diagnoses: None    MIPS     None    MDM  Jennifer Venegas is a 84 year old female with history of Parkinson's disease, presenting today with confusion.  On exam, the patient is afebrile.  She appears generally weak, without any focal neurologic findings.  No evidence for trauma on my exam.  Given reported history of low-grade fever, infectious workup was undertaken.  Fortunately, this has been negative.  No influenza, COVID, or RSV.  No evidence for electrolyte abnormality, including hyponatremia or hypoglycemia.  UA negative for infection.  Blood cultures pending.  Normal white blood cell count, negative procalcitonin.  Chest x-ray read as showing possible pulmonary edema, however, the patient does not have any hypoxia or increased work of breathing.  She has been responsive to fluids, and appears improved after IV fluids.  Lactate is improving as well.  UA negative for signs of infection.  At this point, I suspect her symptoms are again related to volume depletion.  She will road test with a walker per baseline.  If lactate continues to improve below 2.0, I think she can safely be discharged home.    Disposition  Care of the patient was transferred to my colleague Dr. Acosta pending repeat lactate and road test.     ICD-10 Codes:    ICD-10-CM    1. Volume depletion  E86.9       2. Altered mental status, unspecified altered mental status type  R41.82       3. Hypertension, unspecified type  I10            Scribe Disclosure:  I, Louann Barajas, am serving as a scribe at 8:36 AM on 6/25/2024 to document services personally performed by Ruth Bey MD  based on my observations and the provider's statements to me.        Ruth Bey MD  06/25/24 6107

## 2024-06-25 NOTE — ED NOTES
Patient has a wound on right lower leg draining serosanguinous fluid with an old dressing. Dr. Bey notified.

## 2024-06-25 NOTE — ED PROVIDER NOTES
Signed out from previous team pending repeat labs and road test.  Patient was very clearly unsuccessful in her road test, and also her heart rate has begun to creep up even after fluids again.  Her lactic acid is also seeming to trend up, and on the whole after evaluating the patient and speaking with the family, we all agreed that the patient should not go back to [facility] just yet.  She is in the assisted living side, but given how poorly she performed on the road test, I do think she needs to come into the hospital and be seen possibly by neurology.  Her blood pressure is quite labile, as is her heart rate, but it appears to be in sinus tach, not necessarily A-fib.  This may represent a parkinsonian crisis as that may explain her significant decrease in mobility as well as her autonomic vital sign abnormalities.  Son at bedside feels comfortable with this plan, will plan for admission as above.  I spoke to Dr. Madden who generously agreed to accept care of the patient.     Walter Acosta MD  06/25/24 9199

## 2024-06-25 NOTE — PHARMACY-ADMISSION MEDICATION HISTORY
Pharmacist Admission Medication History    Admission medication history is complete. The information provided in this note is only as accurate as the sources available at the time of the update.    Information Source(s): Facility ([facility]/) medication list/MAR via  Medication list from [facility], called triage RN to verify last doses and discussion of Sinemet & Rytary as noted below.    Pertinent Information:   -Per Neurology note 5/31/24 appears intent was to switch Sinemet to Rytary - since 6/3/24, patient has been receiving both Sinemet and Rytary. (Facility triage RN aware and will pass on for clarification when patient returns). Donepezil was also decreased to 5mg at 5/31 appt but increased back to 10mg 6/19.    Changes made to PTA medication list:  Added: None  Deleted: None  Changed: None    Allergies reviewed with patient and updates made in EHR: yes - facility information reports NKDA    Medication History Completed By: Treva Lima [facility] 6/25/2024 6:26 PM    PTA Med List   Medication Sig Last Dose    acetaminophen (TYLENOL) 500 MG tablet Take 2 tablets (1,000 mg) by mouth 2 times daily. May also take 2 tablets (1,000 mg) daily as needed for mild pain or fever. 6/25/2024 at 0700    apixaban ANTICOAGULANT (ELIQUIS) 5 MG tablet Take 1 tablet (5 mg) by mouth 2 times daily 6/25/2024 at 0700    calcium polycarbophil (FIBERCON) 625 MG tablet Take 2 tablets (1,250 mg) by mouth daily 6/25/2024 at 0700    carbidopa-levodopa (RYTARY) 48. MG CPCR per CR capsule Take 3 capsules by mouth 3 times daily 6/25/2024 at 0700 ([id number])    carbidopa-levodopa (SINEMET CR)  MG CR tablet Take 1 tablet by mouth at bedtime At 7pm 6/24/2024 at 1900-see note (intent was switch to Rytary)    carbidopa-levodopa (SINEMET CR)  MG CR tablet Take 1 tablet by mouth at bedtime 6/24/2024 at 1900-see note (intent was switch to Rytary)    carbidopa-levodopa (SINEMET)  MG tablet Take 1 tablet by mouth 3  times daily 6/25/2024 at 0700-see notes (intent was switch to Rytary)    donepezil (ARICEPT) 10 MG tablet Take 1 tablet (10 mg) by mouth at bedtime 6/24/2024 at 1900    entacapone (COMTAN) 200 MG tablet Take 1 tablet (200 mg) by mouth 3 times daily 6/25/2024 at 0700    hydrocortisone 2.5 % cream APPLY RECTALLY TWICE DAILY UNTIL RESOLVED;& MAY APPLY RECTALLY TWICE DAILY AS NEEDED FOR HEMORRHOIDS, OK TO KEEP AT BEDSIDE.     hydrocortisone 2.5 % cream Apply topically daily as needed for other (hemorrhoids)     metroNIDAZOLE (METROGEL) 1 % external gel Apply topically daily To forehead and face until resolved.  Avoid contact with eyes. Ok to keep in room     nystatin (MYCOSTATIN) 479258 UNIT/GM external powder Apply topically 2 times daily Apply under breast twice daily for rash flare until healed. Use as directed with any rash flare. Ok to keep bedside.     SENNA-docusate sodium (SENNA S) 8.6-50 MG tablet Take 1 tablet by mouth 2 times daily as needed (constipation)

## 2024-06-25 NOTE — ED NOTES
Dr. Acosta notified of tachycardia into 130's as well as hypertension. Plan to do EKG and walking trial.

## 2024-06-25 NOTE — ED NOTES
"Pt assisted out of bed with 2 assist and gait belt, pt able to stand but unable to take a step forward, hunched over the walker. Pt says, \"I'm going to fall!\" Patient assisted back to bed. Patient's son at bedside.  "

## 2024-06-25 NOTE — ED NOTES
[facility]  ED Nurse Handoff Report    ED Chief complaint: Flu Symptoms      ED Diagnosis:   Final diagnoses:   None       Code Status: not addressed at this time    Allergies:   Allergies   Allergen Reactions    Other [Seasonal Allergies]      ragweed       Patient Story: Pt ANUJA from [place] she lives in the assisted living area there. -Pt was recently in hospital for dehydration - today she is here because pt has been having a low grade fever and gen weakness for the last 4 days or so. Pt is HTN - she has hx of dementia     Focused Assessment:  Pt oriented to self and place, able to state the correct year but not the correct month, not oriented to situation, pleasant and cooperative. Cheeks flushed but pt is afebrile. Pt is tachycardic with HR 90's-130's, hx of afib but EKG shows ST with PAC's. Pt hypertensive as high as 192/132 - MD aware.    Treatments and/or interventions provided: IV fluids, ears cleaned out (wax)    Patient's response to treatments and/or interventions: unchanged    Chest XR,  PA & LAT   Preliminary Result   IMPRESSION: Mild interstitial prominence throughout both lungs may be   fibrotic, although infection or mild edema could also have this   appearance. There is mild pulmonary venous congestion. Tortuous   calcified thoracic aorta. No pleural effusions. No pneumothorax. Right   shoulder arthroplasty.        Labs Ordered and Resulted from Time of ED Arrival to Time of ED Departure   LACTIC ACID WHOLE BLOOD - Abnormal       Result Value    Lactic Acid 2.3 (*)    COMPREHENSIVE METABOLIC PANEL - Abnormal    Sodium 139      Potassium 4.1      Carbon Dioxide (CO2) 24      Anion Gap 11      Urea Nitrogen 21.6      Creatinine 0.43 (*)     GFR Estimate >90      Calcium 9.4      Chloride 104      Glucose 109 (*)     Alkaline Phosphatase 63      AST 12      ALT <5      Protein Total 6.4      Albumin 4.0      Bilirubin Total 0.3     CBC WITH PLATELETS AND DIFFERENTIAL - Abnormal    WBC  Count 10.8      RBC Count 4.45      Hemoglobin 12.8      Hematocrit 40.3      MCV 91      MCH 28.8      MCHC 31.8      RDW 13.4      Platelet Count 215      % Neutrophils 79      % Lymphocytes 14      % Monocytes 7      % Eosinophils 0      % Basophils 0      % Immature Granulocytes 1      NRBCs per 100 WBC 0      Absolute Neutrophils 8.5 (*)     Absolute Lymphocytes 1.5      Absolute Monocytes 0.7      Absolute Eosinophils 0.0      Absolute Basophils 0.0      Absolute Immature Granulocytes 0.1      Absolute NRBCs 0.0     BASIC METABOLIC PANEL - Abnormal    Sodium 139      Potassium 4.1      Chloride 104      Carbon Dioxide (CO2) 24      Anion Gap 11      Urea Nitrogen 21.6      Creatinine 0.43 (*)     GFR Estimate >90      Calcium 9.4      Glucose 109 (*)    LACTIC ACID WHOLE BLOOD - Abnormal    Lactic Acid 2.1 (*)    LACTIC ACID WHOLE BLOOD - Abnormal    Lactic Acid 2.3 (*)    NT PROBNP INPATIENT - Abnormal    N terminal Pro BNP Inpatient 2,617 (*)    INFLUENZA A/B, RSV, & SARS-COV2 PCR - Normal    Influenza A PCR Negative      Influenza B PCR Negative      RSV PCR Negative      SARS CoV2 PCR Negative     ROUTINE UA WITH MICROSCOPIC REFLEX TO CULTURE - Normal    Color Urine Yellow      Appearance Urine Clear      Glucose Urine Negative      Bilirubin Urine Negative      Ketones Urine Negative      Specific Gravity Urine 1.015      Blood Urine Negative      pH Urine 6.0      Protein Albumin Urine Negative      Urobilinogen Urine Normal      Nitrite Urine Negative      Leukocyte Esterase Urine Negative      RBC Urine <1      WBC Urine 1      Squamous Epithelials Urine <1      Hyaline Casts Urine 1     PROCALCITONIN - Normal    Procalcitonin 0.06     CBC WITH PLATELETS AND DIFFERENTIAL   BLOOD CULTURE   BLOOD CULTURE           To be done/followed up on inpatient unit:  follow admitting MD orders    Does this patient have any cognitive concerns?: Disoriented to time and Disoriented to situation, hx  dementia    Activity level - Baseline/Home:  Unknown  Activity Level - Current:   Pt unable to walk when assisted out of bed, hunched over walker, having to be supported with gait belt and two staff members in attendance. Pt unable to even take a step forward.    Patient's Preferred language: English   Needed?: No    Isolation: None  Infection: Not Applicable  Patient tested for COVID 19 prior to admission: YES  Bariatric?: Yes    Vital Signs:   Vitals:    06/25/24 1151 06/25/24 1152 06/25/24 1200 06/25/24 1201   BP: (!) 155/113  (!) 144/89    Pulse: 86  84    Resp:  20  18   Temp:       SpO2: 96%  96%        Cardiac Rhythm:   ECG results from 06/25/24   EKG 12-lead, tracing only     Value    Systolic Blood Pressure     Diastolic Blood Pressure     Ventricular Rate 126    Atrial Rate 126    WI Interval 176    QRS Duration 66        QTc 457    P Axis 59    R AXIS 2    T Axis 27    Interpretation ECG      Sinus tachycardia with Premature atrial complexes  Anteroseptal infarct , age undetermined  Abnormal ECG  No previous ECGs available           Was the PSS-3 completed:   Yes  What interventions are required if any?               Family Comments: no family present    For the majority of the shift this patient's behavior was Green.     ED NURSE PHONE NUMBER: ([phone])

## 2024-06-25 NOTE — ED NOTES
Bed: ED28  Expected date:   Expected time:   Means of arrival:   Comments:  Bettie 1 - 84F Flu like symptoms

## 2024-06-25 NOTE — ED NOTES
Dr. Bey paged re: hypertension and tachycardia (HR 90's-130's). Patient's cheeks appear flushed but she is afebrile.

## 2024-06-25 NOTE — ED TRIAGE NOTES
Pt BIBA from [place]   she lives in the assisted living area there. -Pt  was recently in hospital for dehydration -   today she is here because pt has been having a low grade fever and gen weakness for the last 4 days or so.  Pt is HTN - she has hx of dementia and she      Triage Assessment (Adult)       Row Name 06/25/24 0801          Triage Assessment    Airway WDL WDL        Respiratory WDL    Respiratory WDL WDL        Skin Circulation/Temperature WDL    Skin Circulation/Temperature WDL WDL        Cardiac WDL    Cardiac WDL X  htn        Peripheral/Neurovascular WDL    Peripheral Neurovascular WDL WDL        Cognitive/Neuro/Behavioral WDL    Cognitive/Neuro/Behavioral WDL X  dementia

## 2024-06-26 NOTE — ED NOTES
Called mom - mom said that patient fainted at school today but didn't hit her head... she said that  witnessed the fall and she said that patient collapsed and was out for a minute.  said that patient fell to her knees and hands and did not hit her head, however, patient has a headache and feels dizzy. Mom said she kind of brushed this off earlier, because she said that patient frequently has headaches. Informed mom that we do not have any more appointments for today, but patient should get checked out so suggested urgent care. Mom verbalized understanding and said that she will take patient to an [facility] urgent care   Pt HR sustained 130s-160s, BP stable. Paged Dr. Bianchi regarding if they want any prns given. Awaiting response.

## 2024-06-26 NOTE — PROGRESS NOTES
Admission/Transfer from: Admission for [place] [id number] RN skin assessment completed. Yes  Significant findings include: skin tear on R calf   WOC Nurse Consult Ordered? No

## 2024-06-26 NOTE — PLAN OF CARE
Goal Outcome Evaluation:  PRIMARY Concern: Admitted with fatigue, weakness,tachycardia, low grade fever.  SAFETY RISK Concerns (fall risk, behaviors, etc.): fall risk.  Isolation/Type:N/A.   Tests/Procedures for NEXT shift: Waiting on various labs and echo to be resulted.   Consults? (Pending/following, signed-off?) CM/SW, PT/OT consults pending.   Where is patient from? (Home, TCU, etc.):  From [facility]  Other Important info for NEXT shift: Morning lactic came back 5.3. 2nd  RRT was called for elevated results.  MD notified.  Son Philip notified.  Chest xray completed and resulted. Skin tear to right LE.  +3 bilateral LE edema.  Was taking x2 different Parkinson's medications  at time of admission.  Recent steroid injection to right knee --5 days ago. Vital signs remain stable.  Patient A/O x3-4.   Anticipated DC date & active delays: TBD     SUMMARY NOTE:  Orientation/Cognitive: A&O X3-4.   Observation Goals (Met/ Not Met): N/A.  Changed to inpatient status after RRT called this morning.   Mobility Level/Assist Equipment: up with SBA2/GB/Walker.   Antibiotics & Plan (IV/po, length of tx left):N/A .  Pain Management: Denies pain.   Tele/VS/O2: VSS ex HTN , VSS on RA Tele:SR with PAC's   ABNL Lab/BG: Lactic acid 5.3   Diet: Regular with good appetite.   Bowel/Bladder: Incontinent Purewick in place.   Skin Concerns: skin tear on R medial calf covered by a mepilex , scattered scabs and bruises  Drains/Devices: PIV SL  Patient Stated Goal for Today: get better and go home.     Summary of shift: Patient set to transfer to [facility]. Stable following second RRT call related to elevated lactic acid levels.  Various lab draw results, echo and blood gas results pending.

## 2024-06-26 NOTE — PROGRESS NOTES
Reviewed Parkinson's medications while at [facility] today. Appears Neurology started Rytary but did not stop Sinemet and facility has been giving both as held a prescription for both. Reviewed neurology note of 5/31/24.

## 2024-06-26 NOTE — PROGRESS NOTES
Top portion must ALWAYS be completed  PRIMARY Concern: Tachycardia GMW  SAFETY RISK Concerns (fall risk, behaviors, etc.): fall risk   Isolation/Type: none  Tests/Procedures for NEXT shift: Am labs  Consults? (Pending/following, signed-off?) CM/SW, PT/OT consults pending   Where is patient from? (Home, TCU, etc.): [facility]  Other Important info for NEXT shift: none  Anticipated DC date & active delays: TBD  _____________________________________________________________________________  SUMMARY NOTE:  Orientation/Cognitive: A&O X2 disoriented to time, and situation  Observation Goals (Met/ Not Met): Not met  Mobility Level/Assist Equipment: lift, Not OOB yet  Antibiotics & Plan (IV/po, length of tx left):   Pain Management: Denies pain   Tele/VS/O2: VSS X HTN on RA Tele: Sinus tachy  ABNL Lab/BG: Lactic acid 2.3, BNP 2,617, Trop 37  Diet: Reg  Bowel/Bladder: Incontinent Purewick in place  Skin Concerns: skin tear on R calf covered by a mepilex   Drains/Devices: PIV SL  Patient Stated Goal for Today: rest

## 2024-06-26 NOTE — PROGRESS NOTES
[facility]    Medicine Progress Note - Hospitalist Service    Date of Admission:  6/25/2024    Assessment & Plan   Jennifer Venegas is a 84 year old female admitted on 6/25/2024.  Past history of parkinsonism, currently living at an assisted living facility was brought in due to concerns for general weakness and significant difficulty with ambulation.  In the ER patient had significant blood pressure elevation and tachycardia which improved with IV labetalol prior to transferring to the floor.       Lactic acidosis of unclear etiology  *admission lactate 2.1, given 500 ml NS in ED with serial lactate overnight trending up  *admission UA wnl, COVID/flu/RSV negative; procal low  *admission CXR with infiltrate vs edema vs fibrosis  *reportedly had recent right knee steroid injection - no signs of septic arthritis on exam    - lactate 5.1 this AM, new leukocytosis 13.9 overnight but afebrile, is actually hypertensive  - trend lactate  - repeat CXR pending  - uptrending BNP, LE edema and crackles raise question of CHF exacerbation - obtain TTE  - procal pending  - admission blood cultures ngtd, repeat blood cultures 06/26/24   - no hypoxia or cough but will empirically cover with zosyn for now  - transfer to C for closer monitoring pending further workup given unclear nature of lactic acidosis    ADDENDUM:  lactate improved to 2.9 on repeat without intervention - repeat lactate ordered to monitor.  Procal remains low.  TTE showing normal EF.  Procal remains low.  Diastolic pressures appear fairly labile - ?presentation be related to receiving both Sinemet and Rytary at AL or just significant autonomic dysfunction    Generalized weakness  Failure to thrive  *resides at [facility] with ADL support  *Unclear if this is a significant change from baseline versus slow progressive decline with her history of parkinsonism  *thyroid studies as below, suspect sick euthyroid  - PT OT  - Consult to social  "worker     Parkinson's disease  *experienced significant hypertension and sinus tachycardia in the ER - ?autonomic dysfunction  *per PharmD, was inappropriately receiving both Sinemet and Rytary at her AL since 6/3; intent was for Rytary alone  - continue PTA Rytary, discontinue Sinemet     Atrial fibrillation  *not on any AV lacey blockers  - continue PTA apixaban     Suspected sick euthyroid  *admission TSH 8.11 with normal free T4  - repeat studies in follow up with PCP in several weeks            Diet: Regular Diet Adult    DVT Prophylaxis: DOAC  [place] Catheter: Not present  Lines: None     Cardiac Monitoring: ACTIVE order. Indication: Syncope- low cardiac risk (24 hours)  Code Status: Full Code      Clinically Significant Risk Factors Present on Admission               # Drug Induced Coagulation Defect: home medication list includes an anticoagulant medication    # Hypertension: Noted on problem list   # Dementia: noted on problem list           # Overweight: Estimated body mass index is 28.1 kg/m  as calculated from the following:    Height as of this encounter: 1.651 m (5' 5\").    Weight as of this encounter: 76.6 kg (168 lb 14 oz).              Disposition Plan     Medically Ready for Discharge: Anticipated in 2-4 Days             Rajat Rome MD  Hospitalist Service  [facility]  Securely message with [employer] (more info)  Text page via [employer] Paging/Directory   ______________________________________________________________________    Interval History   Reports feeling well.  Denies fever/chills, cough, dyspnea, dysuria, rash/sores, myalgias, arthralgias, headache, sore throat, nausea, diarrhea, abdominal pain.  No lightheadedness.  No pain complaints.     Physical Exam   Vital Signs: Temp: 98.9  F (37.2  C) Temp src: Oral BP: (!) 153/95 Pulse: 62   Resp: 18 SpO2: 96 % O2 Device: None (Room air)    Weight: 168 lbs 13.96 oz    General Appearance: Well nourished female in " NAD  Respiratory: few bibasilar crackles, no wheezing or tachypnea   Cardiovascular: irregular rhythm (frequent PAC's on tele), normal rate, normal s1/s2 without murmur  GI: normal bowel sounds, abdomen soft and nontender, nondistended  Skin: 1+ lower extremity edema, wound to posterior right calf not appearing infected  Other: Awake and alert, oriented x3, CN grossly intact     Medical Decision Making       45 MINUTES SPENT BY ME on the date of service doing chart review, history, exam, documentation & further activities per the note.      Data     I have personally reviewed the following data over the past 24 hrs:    12.1 (H)  \   13.1   / 210     138; 138 102; 102 12.2; 12.2 /  123 (H); 123 (H)   3.9; 3.9 25; 25 0.57; 0.57 \     ALT: 10 AST: 13 AP: 71 TBILI: 0.8   ALB: 3.9 TOT PROTEIN: 6.5 LIPASE: N/A     Trop: 37 (H) BNP: 5,207 (H)     TSH: 8.11 (H) T4: 1.01 A1C: N/A     Procal: N/A CRP: N/A Lactic Acid: 5.3 (HH)         Imaging results reviewed over the past 24 hrs:   Recent Results (from the past 24 hour(s))   Chest XR,  PA & LAT    Narrative    CHEST TWO VIEWS  6/25/2024 11:25 AM     HISTORY: Confusion.    COMPARISON: None.      Impression    IMPRESSION: Mild interstitial prominence throughout both lungs may be  fibrotic, although infection or mild edema could also have this  appearance. There is mild pulmonary venous congestion. Tortuous  calcified thoracic aorta. No pleural effusions. No pneumothorax. Right  shoulder arthroplasty.    MARY HERNÁNDEZ MD         SYSTEM ID:  [id number]   XR Chest Port 1 View    Narrative    XR CHEST PORT 1 VIEW 6/26/2024 10:18 AM    HISTORY: elevated lactic acid with rhales    COMPARISON: 6/25/2024      Impression    IMPRESSION:Stable mild linear atelectasis in the left lung base.  Otherwise, no focal airspace opacities, pleural effusion or  pneumothorax. The cardiac and mediastinal silhouettes are normal. A  right shoulder arthroplasty.    SHANE SHANNON MD         SYSTEM ID:   [id number]

## 2024-06-26 NOTE — PROGRESS NOTES
RECEIVING UNIT ED HANDOFF REVIEW    ED Nurse Handoff Report was reviewed by: Evelyn Garza RN on June 25, 2024 at 7:04 PM

## 2024-06-26 NOTE — PLAN OF CARE
13:15-19:00    Orientations: A&OX4  Vitals: HTN noted & brief runs of rapid A-fib, otherwise VSS on RA.   Pain: Denies pain   Tele: A-fib CVR  GI/: Adequate uop via purewick. BMX1.  Diet: Tolerating regular diet  Ambulation/Assist: Not oob this shift. T/R  Skin/Wounds: R calf skin tear, blanchable redness to sacrum.   LDA: PIV X1 SL  Labs: Trending lactic  Plan: ctm

## 2024-06-26 NOTE — CODE/RAPID RESPONSE
[facility]    Sepsis Evaluation Progress Note    Date of Service: 06/26/2024    I was called to see Jennifer Venegas due to  elevated lactic acid of 5.3 . She is not known to have an infection.     On my arrival pt sitting at edge of bed in no acute distress. She denies pain. Denies SOB, chest pain, nausea, abdominal pain or headache. Denies dysuria. No cough or rhinorrhea. Spoke with son Philip and updated on current status. He notes pt had a recent right knee injection.     On exam is alert and answers questions appropriately. Readily falls asleep. Normal S1S2 with PVCs. LS with rales to bilateral lower lobes. Breathing without distress. Skin warm and dry. 3+ pitting edema to bilateral lower extremities. ~1inch weeping skin tear to right medial calf. Right knee is swollen but no errythmea. Active ROM without pain. Left knee active ROM minimal pain which is baseline and hx knee replacement.     Vital Signs:  Temp 98.1 oral. HR 82. /74. SpO2 94% on RA.     Physical Exam  Constitutional:       General: She is not in acute distress.     Appearance: She is not ill-appearing or diaphoretic.   HENT:      Mouth/Throat:      Mouth: Mucous membranes are moist.   Eyes:      Extraocular Movements: Extraocular movements intact.      Pupils: Pupils are equal, round, and reactive to light.   Pulmonary:      Effort: Pulmonary effort is normal.      Breath sounds: Rales present.   Abdominal:      General: Bowel sounds are normal. There is no distension.      Palpations: Abdomen is soft.      Tenderness: There is no abdominal tenderness.   Musculoskeletal:      Right lower leg: 3+ Edema present.      Left lower leg: 3+ Edema present.   Skin:     General: Skin is warm and dry.   Neurological:      Mental Status: She is alert and oriented to person, place, and time. Mental status is at baseline.     Lab:  Lactic Acid   Date Value Ref Range Status   06/26/2024 5.3 (HH) 0.7 - 2.0 mmol/L Final     No results  "for input(s): \"PHV\", \"PO2V\", \"PCO2V\", \"HCO3V\" in the last 168 hours.  Recent Labs   Lab 06/26/24  0449 06/26/24  0126 06/25/24  0842   WBC 12.1* 13.9* 10.8   HGB 13.1 12.5 12.8   HCT 41.0 39.8 40.3   MCV 93 92 91    213 215     Recent Labs   Lab 06/26/24  0449 06/25/24  0844     138 139  139   POTASSIUM 3.9  3.9 4.1  4.1   CHLORIDE 102  102 104  104   CO2 25 25 24 24   ANIONGAP 11  11 11  11   *  123* 109*  109*   BUN 12.2  12.2 21.6  21.6   CR 0.57  0.57 0.43*  0.43*   GFRESTIMATED 89  89 >90  >90   SHANNON 9.1  9.1 9.4  9.4   PROTTOTAL 6.5 6.4   ALBUMIN 3.9 4.0   BILITOTAL 0.8 0.3   ALKPHOS 71 63   AST 13 12   ALT 10 <5       Assessment and Plan  Elevated lactic secondary to potential CHF vs medication effects vs PNA    The SIRS criteria and exam findings are likely due to NO EVIDENCE OF SEPSIS at this time.  Vital sign, physical exam, and lab findings are due to potential heart failure vs medication effects.    Interventions:  -Procalcitonin   -VBG   -INR  -add on CMP for LFTs   -Continuous telemetry   -VS q4 hrs   -I&O q 4hrs   -Noted echo previously ordered, should be done 6/26  -Will add empiric zosyn given CXR from 6/25  -Called and updated son Philip on plan of care     ID: The patient is currently on the following antibiotics:  Anti-infectives (From now, onward)      None          Current antibiotic coverage  will add empiric zosyn for potential PNA coverage . CXR from 6/25 shows mild intersitial prominence throughout both lungs which may be fibrotic vs infection or mild edema.       Fluid: Fluid bolus not indicated due to: CHF & Pulmonary Edema.    Lab: Repeat lactic acid ordered by reflex for 2 hours from initial collection.    Disposition: The patient will remain on the current unit. We will continue to monitor this patient closely. Made inpatient status from observation.     Discussed and defer further cares to primary hospitalist physician Dr. Wild [place]" JERAMY Purdy  [facility]  Securely message with the [employer] (learn more here)  Text page via [employer] Paging/Directory    I spent 30 mins at the bedside evaluating and coordinating the care and services outlined in this note.

## 2024-06-26 NOTE — PLAN OF CARE
Goal Outcome Evaluation:    Overall Patient Progress: no change    Observation goals  PRIOR TO DISCHARGE        Comments:   -Diagnostic tests and consults completed and resulted-Not met, Echo, consults pending  -Vital signs normal or at patient baseline-Met  -Tolerating oral intake to maintain hydration-Met  -Returns to baseline functional status-Not met  -Safe disposition plan has been identified-Not met,consults pending  Nurse to notify provider when observation goals have been met and patient is ready for discharge.        PRIMARY Concern: Here with fatigue, weakness,tachycardia, low grade fever  SAFETY RISK Concerns (fall risk, behaviors, etc.): fall risk   Isolation/Type:n/a  Tests/Procedures for NEXT shift: BNP  Consults? (Pending/following, signed-off?) CM/SW, PT/OT consults pending   Where is patient from? (Home, TCU, etc.): [facility]  Other Important info for NEXT shift: Pt lactic came back 3.4, RRT was called, bolus and Labetalol given, recheck Lactic was 3.5, MD notified, added BNP recheck.  Anticipated DC date & active delays: TBD    SUMMARY NOTE:  Orientation/Cognitive: A&O X2 d/o to time & situation  Observation Goals (Met/ Not Met): Not met  Mobility Level/Assist Equipment: lift per previous shift,not OOB this shift  Antibiotics & Plan (IV/po, length of tx left):n/a  Pain Management: Denies pain   Tele/VS/O2: VSS ex HTN , VSS on RA Tele: Sinus tachy  ABNL Lab/BG: Lactic acid 3.5,   Diet: Reg  Bowel/Bladder: Incontinent Purewick in place  Skin Concerns: skin tear on R medial calf covered by a mepilex , scattered scabs and bruises  Drains/Devices: PIV SL  Patient Stated Goal for Today: sleep

## 2024-06-26 NOTE — H&P
[facility]    History and Physical - Hospitalist Service       Date of Admission:  6/25/2024    Assessment & Plan      Jennifer Venegas is a 84 year old female admitted on 6/25/2024. She has a history of parkinsonism, currently living at an assisted living facility was brought in due to concerns for general weakness patient continues to have significant difficulty with ambulation.  In the ER patient had significant blood pressure elevation and tachycardia which improved with IV labetalol prior to transferring to the floor.    Generalized weakness  Failure to thrive  -Elderly female currently residing at [facility], does have support to help with her ADLs especially bathing twice a week  -Unclear if this is a significant change from baseline versus slow progressive decline with her history of parkinsonism  -No significant confusion or altered mental status at this time as patient is alert oriented x 3 and able to answer questions coherently.  -Significant elevation in TSH with no prior history of hypothyroidism, most recent check of TSH within normal range, T3 and T4 within normal limits, high likely food of acute illness related, recheck TSH with PCP recommended  -No significant source of infection noticed, no WBC count elevation no hypotension mild elevation in lactic acid noted  -Blood cultures ordered and pending  -Monitor overnight  -PT OT  -Consult to     Parkinson's disease  -Concern for significant hypertension and sinus tachycardia in the ER, no history of noncompliance noted at this time with baseline medications  -Will have to resolve the discrepancy in multiple medications sin since some seem to be continued despite changing regimen as outpatient with neurology  -No significant WBC count elevation, mild elevation in lactic acid recheck ordered, no antibiotics prescribed at this time  -Monitor for any recurrence of fever or chills recheck WBC count in a.m.    Atrial  "fibrillation  -Patient is on apixaban 5 mg twice daily at home which will be continued at this time  -Sinus tachycardia at the time of admission  -Patient is not on any rate control including beta-blocker or calcium channel blocker  -As needed IV labetalol has been ordered for blood pressure and heart rate management during hospital stay, depending on overnight vitals will likely need to start on an agent at discharge.     Observation Goals: -diagnostic tests and consults completed and resulted, -vital signs normal or at patient baseline, -tolerating oral intake to maintain hydration, -returns to baseline functional status, -safe disposition plan has been identified, Nurse to notify provider when observation goals have been met and patient is ready for discharge.  Diet: Regular Diet Adult    DVT Prophylaxis: DOAC  [place] Catheter: Not present  Lines: None     Cardiac Monitoring: ACTIVE order. Indication: Syncope- low cardiac risk (24 hours)  Code Status: Full Code      Clinically Significant Risk Factors Present on Admission               # Drug Induced Coagulation Defect: home medication list includes an anticoagulant medication    # Hypertension: Noted on problem list   # Dementia: noted on problem list           # Overweight: Estimated body mass index is 28.51 kg/m  as calculated from the following:    Height as of this encounter: 1.651 m (5' 5\").    Weight as of this encounter: 77.7 kg (171 lb 4.8 oz).              Disposition Plan     Medically Ready for Discharge: Anticipated Tomorrow           Ehsan Bianchi MD  Hospitalist Service  [facility]  Securely message with gauzz (more info)  Text page via [employer] Paging/Directory     ______________________________________________________________________    Chief Complaint   Generalized weakness, failure to thrive    History is obtained from the patient    History of Present Illness   Jennifer Venegas is a 84 year old female who has a past " medical history of Parkinson's disease, hyperlipidemia, was brought into the ER due to concern for low-grade fever, fatigue, generalized weakness for the last 4 to 5 days.  Patient recalls other than the symptoms listed above she did not have any significant concerns of chest pain syncopal episodes of seizures or loss of consciousness or weakness which is localized to either upper or lower extremities.  In the ER patient was seen and evaluated due to generalized weakness and flulike symptoms, coming from an assisted living facility with a recent hospitalization with similar symptoms.  In the ER patient initially seemed to be normotensive no concern for tachycardia, mild low-grade fever reported, not hypoxic, but progressively patient developed hypotension and significant tachycardia with blood pressure ranging up to 1  systolic, and heart rate up to 140.  Lab workup was done in the ER showing mild elevated lactic acid at 2.3 even with recheck, elevated BNP at 2617, significant elevation in TSH at 8.11, most recent check was within normal limits, T4 within normal range, mild elevation in troponin stable/downtrending on recheck without any chest pain or EKG changes.  Blood cultures ordered in the ER, with the mild elevation in lactic acid although no clear source of infection noticed at the time of admission.  Call received from ER since patient was having generalized weakness inability to to ambulate, with persistent elevation in blood pressure patient not being hypertensive at baseline persistent sinus tachycardia unable to discharge at this time.  No clear clinical diagnosis noted by the time of admission, discussed about getting TSH, urine tox screen.  No further antihypertensives were given by the ER physician, due to suspicion that most of this changes related to parkinsonism features.  At the time of my evaluation patient is resting comfortably in bed initially patient was tired but able to answer  questions, suspicion for confusion was high initially but once an attempt was made to talk to the patient she was able to give accurate answers consistently sometimes language was a barrier.  Patient was concerned about her inability to move just her generalized weakness overall.  Other than that patient did not think she felt much different from her baseline, intermittently made comments about her son visiting her or family which was inconsistent.    Past Medical History    Past Medical History:   Diagnosis Date    Acne rosacea     Anemia 1989    Arthritis     Basal cell carcinoma     Cataract, unspecified cataract type, unspecified laterality     Cervicalgia     Degeneration of cervical intervertebral disc 06/1999    C3-C7    Hearing loss     Hyperlipidemia     Lumbago     Osteoarthrosis     Pain in right shoulder     Parkinson's disease (H)     Resting tremor     Rhinitis, allergic     Right leg weakness     Varicose vein of leg        Past Surgical History   Past Surgical History:   Procedure Laterality Date    APPENDECTOMY  1964    COLONOSCOPY  10/31/2006    EYE SURGERY  10/05/2011    blepharoplasty    GYN SURGERY  1974    Ligate fallopian tube    HERNIA REPAIR  1964    Incisional hernia, reducible    ORTHOPEDIC SURGERY Right 2005    arthroscopy of joint shoulder    ORTHOPEDIC SURGERY Left 04/10/2017    knee arthroplasty    REVERSE ARTHROPLASTY SHOULDER Right 10/25/2018    Procedure: RIGHT REVERSE TOTAL SHOULDER ARTHROPLASTY;  Surgeon: Edd Currie MD;  Location: [facility] OR    SIGMOIDOSCOPY FLEXIBLE  10/2000       Prior to Admission Medications   Prior to Admission Medications   Prescriptions Last Dose Informant Patient Reported? Taking?   SENNA-docusate sodium (SENNA S) 8.6-50 MG tablet   Yes Yes   Sig: Take 1 tablet by mouth 2 times daily as needed (constipation)   acetaminophen (TYLENOL) 500 MG tablet 6/25/2024 at 0700  No Yes   Sig: Take 2 tablets (1,000 mg) by mouth 2 times daily. May also take 2 tablets  (1,000 mg) daily as needed for mild pain or fever.   apixaban ANTICOAGULANT (ELIQUIS) 5 MG tablet 6/25/2024 at 0700  Yes Yes   Sig: Take 1 tablet (5 mg) by mouth 2 times daily   calcium polycarbophil (FIBERCON) 625 MG tablet 6/25/2024 at 0700  Yes Yes   Sig: Take 2 tablets (1,250 mg) by mouth daily   carbidopa-levodopa (RYTARY) 48. MG CPCR per CR capsule 6/25/2024 at 0700 ([id number])  No Yes   Sig: Take 3 capsules by mouth 3 times daily   carbidopa-levodopa (SINEMET CR)  MG CR tablet 6/24/2024 at 1900-see note (intent was switch to Rytary)  Yes Yes   Sig: Take 1 tablet by mouth at bedtime At 7pm   carbidopa-levodopa (SINEMET CR)  MG CR tablet 6/24/2024 at 1900-see note (intent was switch to Rytary)  Yes Yes   Sig: Take 1 tablet by mouth at bedtime   carbidopa-levodopa (SINEMET)  MG tablet 6/25/2024 at 0700-see notes (intent was switch to Rytary)  Yes Yes   Sig: Take 1 tablet by mouth 3 times daily   donepezil (ARICEPT) 10 MG tablet 6/24/2024 at 1900  No Yes   Sig: Take 1 tablet (10 mg) by mouth at bedtime   entacapone (COMTAN) 200 MG tablet 6/25/2024 at 0700  No Yes   Sig: Take 1 tablet (200 mg) by mouth 3 times daily   hydrocortisone 2.5 % cream   Yes Yes   Sig: Apply topically daily as needed for other (hemorrhoids)   hydrocortisone 2.5 % cream   No Yes   Sig: APPLY RECTALLY TWICE DAILY UNTIL RESOLVED;& MAY APPLY RECTALLY TWICE DAILY AS NEEDED FOR HEMORRHOIDS, OK TO KEEP AT BEDSIDE.   metroNIDAZOLE (METROGEL) 1 % external gel   No Yes   Sig: Apply topically daily To forehead and face until resolved.  Avoid contact with eyes. Ok to keep in room   nystatin (MYCOSTATIN) 637428 UNIT/GM external powder   No Yes   Sig: Apply topically 2 times daily Apply under breast twice daily for rash flare until healed. Use as directed with any rash flare. Ok to keep bedside.      Facility-Administered Medications: None        Review of Systems    The 10 point Review of Systems is negative other than  noted in the HPI or here.     Social History   I have reviewed this patient's social history and updated it with pertinent information if needed.  Social History     Tobacco Use    Smoking status: Never    Smokeless tobacco: Never   Substance Use Topics    Alcohol use: Yes     Comment: occasional    Drug use: No         Family History   I have reviewed this patient's family history and updated it with pertinent information if needed.  Family History   Problem Relation Age of Onset    Neurologic Disorder Mother     Parkinsonism Mother     Dementia Mother     Prostate Cancer Father     Hypertension Father     Prostate Cancer Brother     Heart Disease Brother          Allergies   Allergies   Allergen Reactions    Other [Seasonal Allergies]      ragweed        Physical Exam   Vital Signs: Temp: 98.3  F (36.8  C) Temp src: Oral BP: (!) 155/94 Pulse: 93   Resp: 18 SpO2: 93 % O2 Device: None (Room air)    Weight: 171 lbs 4.76 oz    Constitutional: Very frail awake, alert, cooperative, no apparent distress.  Eyes: Conjunctiva and pupils examined and normal.  HEENT: Moist mucous membranes, normal dentition.  Respiratory: Clear to auscultation bilaterally, no crackles or wheezing.  Cardiovascular: Regular rate and rhythm, normal S1 and S2, and no murmur noted.  GI: Soft, non-distended, non-tender, normal bowel sounds.  Lymph/Hematologic: No anterior cervical or supraclavicular adenopathy.  Skin: No rashes, no cyanosis, no edema.  Musculoskeletal: No joint swelling, erythema or tenderness.  Neurologic: Cranial nerves 2-12 intact, normal strength and sensation.  Psychiatric: Alert, oriented to person, place and time,     Medical Decision Making

## 2024-06-26 NOTE — PROGRESS NOTES
Report was called to the [facility] Charge RN @1250.  Patient transferred to room 320-1 @5191.  All belongings sent with the patient.

## 2024-06-26 NOTE — PLAN OF CARE
Goal Outcome Evaluation:    Overall Patient Progress: no change    Observation goals  PRIOR TO DISCHARGE        Comments:   -Diagnostic tests and consults completed and resulted-Not met, Echo, consults pending  -Vital signs normal or at patient baseline-Met  -Tolerating oral intake to maintain hydration-Met  -Returns to baseline functional status-Not met  -Safe disposition plan has been identified-Not met,consults pending  Nurse to notify provider when observation goals have been met and patient is ready for discharge.

## 2024-06-27 NOTE — PROGRESS NOTES
[facility]    Hospitalist Progress Note    Assessment & Plan   Jennifer Venegas is a 84 year old female admitted on 6/25/2024.  Past history of parkinsonism, currently living at an assisted living facility was brought in due to concerns for general weakness and significant difficulty with ambulation.  In the ER patient had significant blood pressure elevation and tachycardia which improved with IV labetalol prior to transferring to the floor.        Lactic acidosis of unclear etiology  *admission lactate 2.1, given 500 ml NS in ED with serial lactate overnight trending up  *admission UA wnl, COVID/flu/RSV negative; procal low  *admission CXR with infiltrate vs edema vs fibrosis  *reportedly had recent right knee steroid injection - no signs of septic arthritis on exam  - lactate 5.1 on admission, new leukocytosis 13.9 overnight but afebrile, is actually hypertensive, lactic acid levels did trend to 2.9 and again went back to 3.1.  -BNP elevated, patient noted to have possibly early diastolic dysfunction on the echocardiogram, EF is good.  Will stop IV fluids.  CPK within normal limit.  Pro-Kieran negative, blood cultures so far negative.  - no hypoxia or cough, LFTs within normal limit, patient afebrile, UA negative, white count normalized, will watch off of antibiotics.     Generalized weakness  Failure to thrive  *resides at [facility] with ADL support  *Unclear if this is a significant change from baseline versus slow progressive decline with her history of parkinsonism  *thyroid studies as below, suspect sick euthyroid  - PT OT  - Consult to      Parkinson's disease  *experienced significant hypertension and sinus tachycardia in the ER - ?autonomic dysfunction  *per PharmD, was inappropriately receiving both Sinemet and Rytary at her AL since 6/3; intent was for Rytary alone  - continue PTA Rytary, discontinue Sinemet     Atrial fibrillation  *not on any AV lacey blockers  - continue  "PTA apixaban      Suspected sick euthyroid  *admission TSH 8.11 with normal free T4  - repeat studies in follow up with PCP in several weeks       Diet :Regular Diet Adult  Snacks/Supplements Adult: Ensure Enlive; Between Meals  DVT Prophylaxis: DOAC  Code Status: Full Code     51 MINUTES SPENT BY ME on the date of service doing chart review, history, exam, documentation & further activities per the note.  Medically Ready for Discharge: Anticipated Tomorrow    Clinically Significant Risk Factors               # Coagulation Defect: INR = 1.32 (Ref range: 0.85 - 1.15) and/or PTT = N/A, will monitor for bleeding    # Hypertension: Noted on problem list     # Dementia: noted on problem list          # Overweight: Estimated body mass index is 28.4 kg/m  as calculated from the following:    Height as of this encounter: 1.651 m (5' 5\").    Weight as of this encounter: 77.4 kg (170 lb 10.2 oz)., PRESENT ON ADMISSION     # Financial/Environmental Concerns: none         Cinthia Jarrell MD  Text Page   (7am to 6pm)    Interval History   Patient  is feeling quite weak, able to communicate appropriately, she mentioned a son who lives in the [facility], she was living in assisted living, may need TCU as per PT.  Will get social work involved.  Lactic acid level still remains high.    -Data reviewed today: I reviewed all new labs and imaging results over the last 24 hours.   Physical Exam     Vital Signs with Ranges  Temp:  [97.4  F (36.3  C)-98.5  F (36.9  C)] 98.5  F (36.9  C)  Pulse:  [] 104  Resp:  [18] 18  BP: (114-176)/() 134/105  SpO2:  [94 %-99 %] 98 %  I/O last 3 completed shifts:  In: 180 [P.O.:180]  Out: 1000 [Urine:1000]    Constitutional: Awake, alert, cooperative, no apparent distress  Respiratory: Clear to auscultation bilaterally, no crackles or wheezing  Cardiovascular: Regular rate and rhythm, normal S1 and S2, and no murmur noted  GI: Normal bowel sounds, soft, non-distended, " non-tender  Skin/Integumen: No rashes, no cyanosis, no edema  Neuro : moving all 4 extremities, no focal deficit noted     Medications   Current Facility-Administered Medications   Medication Dose Route Frequency Provider Last Rate Last Admin    Patient is already receiving anticoagulation with heparin, enoxaparin (LOVENOX), warfarin (COUMADIN)  or other anticoagulant medication   Does not apply Continuous PRN Ehsan Bianchi MD         Current Facility-Administered Medications   Medication Dose Route Frequency Provider Last Rate Last Admin    acetaminophen (TYLENOL) tablet 1,000 mg  1,000 mg Oral BID Ehsan Bianchi MD   1,000 mg at 06/27/24 0818    apixaban ANTICOAGULANT (ELIQUIS) tablet 5 mg  5 mg Oral BID Ehsan Bianchi MD   5 mg at 06/27/24 0818    carbidopa-levodopa (RYTARY) 48. MG per CR capsule 3 capsule  3 capsule Oral TID Ehsan Bianchi MD   3 capsule at 06/27/24 0606    donepezil (ARICEPT) tablet 10 mg  10 mg Oral At Bedtime Ehsan Bianchi MD   10 mg at 06/26/24 2216    entacapone (COMTAN) tablet 200 mg  200 mg Oral TID Ehsan Bianchi MD   200 mg at 06/27/24 1334    piperacillin-tazobactam (ZOSYN) 4.5 g vial to attach to  mL bag  4.5 g Intravenous Q6H Hillary Purdy NP   4.5 g at 06/27/24 1334    sodium chloride (PF) 0.9% PF flush 3 mL  3 mL Intracatheter Q8H Rajat Rome MD   3 mL at 06/27/24 1001       Data   Recent Labs   Lab 06/27/24  0623 06/26/24  1344 06/26/24  1156 06/26/24  0449 06/26/24  0126 06/25/24  0844   WBC 9.3  --   --  12.1* 13.9*  --    HGB 12.9  --   --  13.1 12.5  --    MCV 91  --   --  93 92  --      --   --  210 213  --    INR  --   --  1.32*  --   --   --      --   --  138  138  --  139  139   POTASSIUM 3.8  --   --  3.9  3.9  --  4.1  4.1   CHLORIDE 101  --   --  102  102  --  104  104   CO2 25  --   --  25  25  --  24  24   BUN 13.5  --   --  12.2  12.2  --  21.6  21.6   CR 0.51  --   --  0.57  0.57  --  0.43*  0.43*    ANIONGAP 10  --   --  11  11  --  11  11   SHANNON 9.3  --   --  9.1  9.1  --  9.4  9.4   * 109*  --  123*  123*  --  109*  109*   ALBUMIN  --   --   --  3.9  --  4.0   PROTTOTAL  --   --   --  6.5  --  6.4   BILITOTAL  --   --   --  0.8  --  0.3   ALKPHOS  --   --   --  71  --  63   ALT  --   --   --  10  --  <5   AST  --   --   --  13  --  12     Recent Labs   Lab 06/27/24  0623 06/26/24  1344 06/26/24  0449 06/25/24  0844   * 109* 123*  123* 109*  109*       Imaging:   No results found for this or any previous visit (from the past 24 hour(s)).

## 2024-06-27 NOTE — PROVIDER NOTIFICATION
Paged Dr. Huang: FYI, Patient had 10 beats V run with rate at 135. Other vitals wnl. Aymptomatic.  - no new  orders

## 2024-06-27 NOTE — PLAN OF CARE
Occupational Therapy: Orders received. Chart reviewed and discussed with care team, including IP PT. At baseline, pt has assist in all ADLs. All therapy needs are being met with IP PT. Will defer OT to next level of care. Will complete orders.

## 2024-06-27 NOTE — CONSULTS
"CLINICAL NUTRITION SERVICES  -  ASSESSMENT NOTE    Recommendations Ordered by Registered Dietitian (RD):   - Add ensure 1x daily    Malnutrition:   % Weight Loss:  None noted  % Intake:  Decreased intake does not meet criteria for malnutrition - patient denies issues PTA  Subcutaneous Fat Loss:  Orbital region mild depletion (only 1 indicator - not using)   Muscle Loss:  Temporal region moderate depletion and Dorsal hand region moderate depletion  Fluid Retention:  Mild 2+ (legs) - not using     Malnutrition Diagnosis: Patient does not meet two of the above criteria necessary for diagnosing malnutrition     REASON FOR ASSESSMENT  Jennifer Venegas is a 84 year old female seen by Registered Dietitian for Nutrition Admission Risk Screen Received -   Have you recently lost weight without trying ? - \"unsure\"  Have you been eating poorly due to a decreased appetite ?- \"no\"    NUTRITION HISTORY  - Information obtained from chart and patient report.   - Lives at [facility].    - Patient seen in room this morning.   - She was sleepy, but did answer questions clearly.   - At baseline she has a pretty good appetite. Most days she eats meals TID.   - Occasionally drinks Ensure or similar. No daily use   - NKFA    CURRENT NUTRITION ORDERS  Diet Order:     Regular     Current Intake/Tolerance:  Pt reported eating 100% of her breakfast today - gloria alvarado muffin.     NUTRITION FOCUSED PHYSICAL ASSESSMENT FOR DIAGNOSING MALNUTRITION)  Yes          Observed:    Muscle wasting (refer to documentation in Malnutrition section) and Subcutaneous fat loss (refer to documentation in Malnutrition section)    Obtained from Chart/Interdisciplinary Team:  Redness on sacrum, wound on right lower leg \"skin tear\"    ANTHROPOMETRICS  Height: 5' 5\"  Weight: 170 lbs 10.18 oz (77.4 kg)   Body mass index is 28.4 kg/m .  Weight Status:  Overweight BMI 25-29.9  IBW: 56.8 kg   % IBW: 136%  Weight History: No wt loss indicated - pt unsure. She reports a usual " weight of around 130#, though this doesn't align with recent weights.   Wt Readings from Last 10 Encounters:   06/27/24 77.4 kg (170 lb 10.2 oz)   06/18/24 78.7 kg (173 lb 9.6 oz)   06/15/24 68 kg (150 lb)   05/29/24 78.7 kg (173 lb 9.6 oz)   04/29/24 78 kg (172 lb)   04/24/24 78.7 kg (173 lb 9.6 oz)   04/03/24 73.9 kg (163 lb)   03/06/24 73.9 kg (163 lb)   02/13/24 70.3 kg (155 lb)   01/31/24 70.3 kg (155 lb)       LABS  Labs reviewed    MEDICATIONS  Medications reviewed    ASSESSED NUTRITION NEEDS PER APPROVED PRACTICE GUIDELINES:  Dosing Weight 62 kg - adjusted  Estimated Energy Needs: 3118-8772 kcals (25-30 Kcal/Kg)  Justification: maintenance  Estimated Protein Needs: 74-93 grams protein (1.2-1.5 g pro/Kg)  Justification: preservation of lean body mass  Estimated Fluid Needs: 1 mL/kcal  Justification: maintenance    MALNUTRITION:  % Weight Loss:  None noted  % Intake:  Decreased intake does not meet criteria for malnutrition - patient denies issues PTA  Subcutaneous Fat Loss:  Orbital region mild depletion (only 1 indicator - not using)   Muscle Loss:  Temporal region moderate depletion and Dorsal hand region moderate depletion  Fluid Retention:  Mild 2+ (legs) - not using     Malnutrition Diagnosis: Patient does not meet two of the above criteria necessary for diagnosing malnutrition    NUTRITION DIAGNOSIS:  No nutrition diagnosis identified at this time     NUTRITION INTERVENTIONS  Recommendations / Nutrition Prescription  Regular diet  Add Ensure 1x daily - any flavor    Implementation  Nutrition education: Per Provider order if indicated   Medical Food Supplement: as ordered    Nutrition Goals  Intake of >75% meals TID + 1 supplement.     MONITORING AND EVALUATION:  Progress towards goals will be monitored and evaluated per protocol and Practice Guidelines    Albertina Reinoso RD, LD

## 2024-06-27 NOTE — CONSULTS
Care Management Initial Consult    General Information  Assessment completed with: Care Team Member, Patient, Family (Staff at [facility]), Nurse, patient, daughter  Type of CM/SW Visit: Initial Assessment    Primary Care Provider verified and updated as needed: No   Readmission within the last 30 days: no previous admission in last 30 days      Reason for Consult: discharge planning  Advance Care Planning: Advance Care Planning Reviewed: present on chart          Communication Assessment  Patient's communication style: spoken language (English or Bilingual)    Hearing Difficulty or Deaf: no   Wear Glasses or Blind: no    Cognitive  Cognitive/Neuro/Behavioral: .WDL except, level of consciousness, orientation  Level of Consciousness: alert, confused  Arousal Level: opens eyes spontaneously  Orientation: disoriented to, situation, place  Mood/Behavior: calm, cooperative  Best Language: 0 - No aphasia  Speech: clear, logical    Living Environment:   People in home: alone, other (see comments) (Lives in [facility])     Current living Arrangements: assisted living  Name of Facility: [facility]   Able to return to prior arrangements: yes       Family/Social Support:  Care provided by: other (see comments), self (Staff at [facility])  Provides care for: no one, unable/limited ability to care for self  Marital Status:   Children          Description of Support System: Supportive    Support Assessment: Adequate family and caregiver support    Current Resources:   Patient receiving home care services: No     Community Resources: Skilled Nursing Facility  Equipment currently used at home: walker, rolling  Supplies currently used at home: None    Employment/Financial:  Employment Status: retired        Financial Concerns: none   Referral to Financial Worker: No       Does the patient's insurance plan have a 3 day qualifying hospital stay waiver?  No    Lifestyle & Psychosocial Needs:  Social Determinants of Health     Food Insecurity: No Food  Insecurity (1/22/2024)    Received from [facility]    Hunger Vital Sign     Worried About Running Out of Food in the Last Year: Never true     Ran Out of Food in the Last Year: Never true   Depression: At risk (1/29/2024)    PHQ-2     PHQ-2 Score: 5   Housing Stability: Unknown (1/22/2024)    Received from [facility]    Housing Stability Vital Sign     Unable to Pay for Housing in the Last Year: No     Number of Places Lived in the Last Year: Not on file     In the last 12 months, was there a time when you did not have a steady place to sleep or slept in a shelter (including now)?: No   Tobacco Use: Low Risk  (4/29/2024)    Patient History     Smoking Tobacco Use: Never     Smokeless Tobacco Use: Never     Passive Exposure: Not on file   Financial Resource Strain: Not on file   Alcohol Use: Not on file   Transportation Needs: No Transportation Needs (1/22/2024)    Received from [facility]    PRAPARE - Transportation     Lack of Transportation (Medical): No     Lack of Transportation (Non-Medical): No   Physical Activity: Not on file   Interpersonal Safety: Unknown (1/22/2024)    Received from [facility]    Humiliation, Afraid, Rape, and Kick questionnaire     Fear of Current or Ex-Partner: Not on file     Emotionally Abused: Not on file     Physically Abused: No     Sexually Abused: No   Stress: Not on file   Social Connections: Not on file   Health Literacy: Not on file       Functional Status:  Prior to admission patient needed assistance:   Dependent ADLs:: Ambulation-walker, Bathing, Dressing, Grooming, Positioning, Transfers, Toileting  Dependent IADLs:: Cleaning, Cooking, Laundry, Shopping, Meal Preparation, Medication Management, Transportation      Values/Beliefs:  Spiritual, Cultural Beliefs, Worship Practices, Values that affect care: no          Values/Beliefs Comment: Jarrett Benavides    Additional Information:  Per CM consult chart was reviewed.  "Patient is a 84 year old female who presents at [facility] with \"general weakness\" H&P reviewed patient has history of \"parkinsonism.\" Per chart review patient is currently living at [facility] assisted living facility. Writer called [facility] ([phone]) and spoke with a nurse their. Nurse states that patient is currently receiving assistance with toileting, dressing, medication management, bathing, bed mobility and transfers. The patient also receives and escort to the dining room for meals. The nurse at [facility] stated that the patient needs to be at baseline in order to return and believes that a TCU would be a good idea to help her gain strength.     Writer attempted to meet with patient at bedside but she was sleeping in the chair. Writer called the daughter Jana at the number listed on the chart. No answer so writer LVM requesting call back.     Writer will continue to follow up with patient and family for discharge planning.     Addendum 1215: Writer went back to follow up with patient and she was awake. Patient stated that she was tired but was okay to talk. Writer explained that PT was recommending transitional care before she go back to assisted living. Pt stated she did not like this idea. Writer explained that [facility] has a TCU that we can send a referral to and if accepted she would be able to stay at the same building. Pt asked about insurance and writer explained they would have to get authorization. Writer asked patient if she has family support and patient listed her son Philip as her support. Writer asked patient if they could call Philip and patient stated that was okay.     Writer will send referral to [facility] TCU and continue to follow for discharge planning    VINI GONZALEZ [facility]  INPATIENT CARE COORDINATION      "

## 2024-06-27 NOTE — PLAN OF CARE
[id number]    Orientations: A&OX2-3. Consistently disoriented to situation & intermittently disoriented to time & place.   Vitals: HTN noted, controlled with PRN Labetalol X1 for BP of 190/129. Brief runs of rapid A-fib, otherwise VSS on RA.   Pain: Denies pain   Tele: SR w/ PVCs & intermittent A-fib  GI/: Adequate uop via purewick. +BS, +flatus, -BM.  Diet: Tolerating regular diet  Ambulation/Assist: A1GBW per PT.  Skin/Wounds: R calf skin tear, blanchable redness to sacrum.   LDA: PIV X1 SL  Labs: Trending lactic  Plan: ctm

## 2024-06-27 NOTE — PROVIDER NOTIFICATION
MD Notification    Notified Person: MD    Notified Person Name: Dr. Jarrell    Notification Date/Time: 6/27 @ 10:15    Notification Interaction: "[employer]" Messaging    Purpose of Notification: FYI pt just had an 18 beat run of a-fib RVR. HR max 180 bpm    Orders Received:

## 2024-06-27 NOTE — PROGRESS NOTES
"   06/27/24 1111   Appointment Info   Signing Clinician's Name / Credentials (PT) Ovidio Marino DPT   Living Environment   People in Home alone   Current Living Arrangements assisted living   Home Accessibility no concerns   Transportation Anticipated family or friend will provide   Living Environment Comments Reports living in [facility]   Self-Care   Usual Activity Tolerance moderate   Current Activity Tolerance poor   Equipment Currently Used at Home walker, rolling   Fall history within last six months no   Activity/Exercise/Self-Care Comment Per Pt report uses a 4WW for mobility. States can walk \"a mile\". Denies any falls. Reports assistance w/ dressing and showering. [facility] also provides meals and cleaning.   General Information   Onset of Illness/Injury or Date of Surgery 06/25/24   Referring Physician Ehsan Bianchi MD   Patient/Family Therapy Goals Statement (PT) Return to home   Pertinent History of Current Problem (include personal factors and/or comorbidities that impact the POC) Jennifer Venegas is a 84 year old female admitted on 6/25/2024. She has a history of parkinsonism, currently living at an assisted living facility was brought in due to concerns for general weakness patient continues to have significant difficulty with ambulation.  In the ER patient had significant blood pressure elevation and tachycardia which improved with IV labetalol prior to transferring to the floor.   Existing Precautions/Restrictions fall   Cognition   Affect/Mental Status (Cognition) WFL   Orientation Status (Cognition) oriented x 4   Follows Commands (Cognition) WFL   Cognitive Status Comments Able to tell writer month, year and location   Pain Assessment   Patient Currently in Pain No   Range of Motion (ROM)   ROM Comment WFLs for mobility and transfers no formal testing completed   Strength (Manual Muscle Testing)   Strength Comments Grossly 4/5 to BLEs on strength testing   Bed Mobility   Comment, (Bed Mobility) Defered seated " in bedside chair   Transfers   Comment, (Transfers) Sit to stand w/ FWW and Mod A x1   Gait/Stairs (Locomotion)   Comment, (Gait/Stairs) Defered d/t weakness   Balance   Balance Comments Mild unsteadiness in standing   Clinical Impression   Criteria for Skilled Therapeutic Intervention Yes, treatment indicated   PT Diagnosis (PT) Impaired ambulation   Influenced by the following impairments Impaired strength, balance and activity tolerance   Functional limitations due to impairments Impaired ADLs, IADLs and functional mobility   Clinical Presentation (PT Evaluation Complexity) stable   Clinical Presentation Rationale Clinical judgment   Clinical Decision Making (Complexity) low complexity   Planned Therapy Interventions (PT) balance training;bed mobility training;gait training;home exercise program;patient/family education;strengthening;transfer training;progressive activity/exercise   Risk & Benefits of therapy have been explained evaluation/treatment results reviewed;care plan/treatment goals reviewed;risks/benefits reviewed;current/potential barriers reviewed;participants voiced agreement with care plan;participants included;patient   PT Total Evaluation Time   PT Eval, Low Complexity Minutes (79271) 10   Physical Therapy Goals   PT Frequency 5x/week   PT Predicted Duration/Target Date for Goal Attainment 07/07/24   PT Goals Bed Mobility;Transfers;Gait   PT: Bed Mobility Independent;Supine to/from sit   PT: Transfers Modified independent;Sit to/from stand;Assistive device   PT: Gait Modified independent;Rolling walker;150 feet   Interventions   Interventions Quick Adds Therapeutic Activity   Therapeutic Activity   Therapeutic Activities: dynamic activities to improve functional performance Minutes (93511) 15   Symptoms Noted During/After Treatment Fatigue   Treatment Detail/Skilled Intervention Pt seated in bedside chair at start of session. Pt agreeable to PT. BP taken in sitting at 134/105, unclear if accurate  reading taking a long time to read. Once seated EOB able to lean forward in chair w/o assistance. Pt unable to scoot forward while in chair. Sit to stand x3 in chair w/ FWW and Mod A x1. Mild unsteadiness in standing. Unable to march on first attempt in standing. On second attempt only able to march R leg back, unable to complete again. Reports unable to lift L leg for years, however, able to complete in strength testing. HR fluctuating throughout session from 80s to 140s occaisonally. Dr coming in room at end of session. Pt seated in bedside chair w/ all needs met and call light in place w/ chair alarm on.   PT Discharge Planning   PT Plan Repeat STS, assess bed mobility, transfers as able, gait w/ close chair follow, monitor vitals   PT Discharge Recommendation (DC Rec) Transitional Care Facility   PT Rationale for DC Rec Pt below baseline mobility of Mod I w/ 4WW. Currently needing heavy A for sit to stand w/ FWW. Did not attempt gait this date. Anticipate when medically ready will need TCU to improve upon functional mobility and strengthening.   PT Brief overview of current status Mod A x1 for sit to stand w/ FWW   Total Session Time   Timed Code Treatment Minutes 15   Total Session Time (sum of timed and untimed services) 25

## 2024-06-27 NOTE — PLAN OF CARE
Goal Outcome Evaluation:      Plan of Care Reviewed With: patient      Orientations: A/O x2  Vitals/Pain: Vitals stable  Tele: SR with pac's  Lines/Drains: PIV flushing well  Skin/Wounds: Redness on the sacrum covered with mepilex  GI/: Incontinent  Labs: Abnormal/Trends, Electrolyte Replacement- No labs replaced overnight, but will have morning labs drawn.  Ambulation/Assist: Patient needs to be repositioned q2hr.  Sleep Quality: Slept in and out. She is busy taking the oxymetry probe out.  Plan: Continue the antibiotic, and monitoring the lactic acid.

## 2024-06-28 NOTE — PLAN OF CARE
Pt A&O x2-3, disoriented to situation, and occasonally to month. BP elevated during the shift at times, all other VS WDL on RA. Pt denied pain. Tele shows SR with frequent PACs. Purewick in place with adequate UO. BS+, no BM. Saline locked to LAC. Used 2A, FWW, and GB for pivot transfer. Pt required lots of one-step directions and repeating to participate in the process. Turning and repositioning pt often. Lactic acid level this am wsa 2.1. WBC continue to trend downward. She has been resting between cares.    Plan: Continue to monitor Lactic acid level; Work with PT to regain strength; Possible discharge soon to TCU or back to half-way

## 2024-06-28 NOTE — PLAN OF CARE
Goal Outcome Evaluation:      Plan of Care Reviewed With: patient    Overall Patient Progress: no changeOverall Patient Progress: no change    Outcome Evaluation: Pt's Blood Cx came back positive.    [id number]    Orientations: Somnolent, arousal to voice. Disoriented to situation.   Vitals/Pain: VSS except for hypertensive and on RA. Denied pain.  Lines/Drains: L AC PIV SL  GI/: No BM during this shift, BS audible and passing flatulence. Incontinent of bladder.  Labs: Abnormal/Trends, Electrolyte Replacement- Lactic 2.1 at 0539  Ambulation/Assist: Up 2PA pivot  Plan: ID consulted, anticipate discharge to [facility].

## 2024-06-28 NOTE — CARE PLAN
Orientations: A&O x 1-3. Disoriented to situation and occasionally to time and place.  Vitals/Pain: VSS. Tachy and elevated BP's at times (PRN labetalol for SBP>180). On RA. Pt denies pain.   Tele: SR with PACs  Lines/Drains: L peripheral IV  Skin/Wounds: R calf skin tear (Dressing CDI). Blanchable redness on buttocks/sacrum. Reddened/irritated perineum.  GI/: Tolerating regular diet (not much of an appetite). Normoactive bowel sounds but no BM since 6/26/24. Voiding adequately (incontinent -- external catheter temporarily removed due to skin irritation).  Labs: Abnormal/Trends, Electrolyte Replacement--- Lactic of 2.1 (trending down). Blood cultures +.  Ambulation/Assist: Up with 2 GBW pivot (moves very slow and needs redirecting). Turn/repo q2 hours.  Plan: Monitor infection/lactic, ambulate with PT, and possibly discharge soon to TCU for further care.    Abdomen CT today, see results for more info.  Patient easily arousaled but very lethargic throughout the day (family states this is baseline)

## 2024-06-28 NOTE — PROGRESS NOTES
PAS-RR    D: Per DHS regulation, JAMARI completed and submitted PAS-RR to [place] [employer] on [employer]ging Direct Connect via the Senior LinkAge Line.  PAS-RR confirmation # is : [id number]      P: Further questions may be directed to Senior LinkAge Line at #[phone], option #4 for PAS-RR staff.

## 2024-06-28 NOTE — PROGRESS NOTES
Care Management Follow Up    Length of Stay (days): 2    Expected Discharge Date: 06/29/2024     Concerns to be Addressed: discharge planning     Patient plan of care discussed at interdisciplinary rounds: Yes    Anticipated Discharge Disposition: Transitional Care     Anticipated Discharge Services: None  Anticipated Discharge DME: None    Patient/family educated on Medicare website which has current facility and service quality ratings: yes  Education Provided on the Discharge Plan: Yes  Patient/Family in Agreement with the Plan: yes    Referrals Placed by CM/SW:    Private pay costs discussed: private room/amenity fees    Additional Information:  Writer did not receive a call back from the patients daughter so writer called patients son Philip and DILIP. Philip called writer back and writer explained that they were recommending TCU at discharge for the patient. Writer explained that [facility] was able to accept this patient back in their TCU unit and Philip stated that the family would prefer for her to go back to [facility]. Writer answered Indira questions regarding patients currently mobility.     Philip had asked questions regarding [place] and if that would mess up discharge. Writer explained that they could not speak on the medical side of things but once the patient is medically cleared it is ultimately the discharge plan is up to the family. Writer encouraged Philip and family to reach out the the doctor and nurse for other questions.     Philip asked about transporting patient to the facility. Writer explained that he could come and transport the patient and that he would be able to push her over through the skyway otherwise JAMARI can set up wheelchair transportation with [facility] transport for 90 dollars and then 6 dollars a mile. Philip agreed he would be able to transport his mother. Writer explained that patient is not ready for discharge today but JAMARI will keep following as  discharge approaches. Philip was thankful for the information and resources.     Writer completed the insurance authorization with [employer]. Approval code: [id number]  Approval dates: 6/28-7/4    VINI GONZALEZ [facility]  INPATIENT CARE COORDINATION

## 2024-06-28 NOTE — PROGRESS NOTES
[facility]    Hospitalist Progress Note    Assessment & Plan   Jennifer Venegas is a 84 year old female admitted on 6/25/2024.  Past history of parkinsonism, currently living at an assisted living facility was brought in due to concerns for general weakness and significant difficulty with ambulation.  In the ER patient had significant blood pressure elevation and tachycardia which improved with IV labetalol prior to transferring to the floor.  Until 6/28 or her blood cultures obtained were negative, on 6/28 blood culture obtained on 6/26 1/2 came back positive with gram-negative bacilli, not listed in the usual very gene analysis, possibly a contaminant, antibiotics were discontinued on 6/27, Rocephin was started back on 6/28, continue until typing is available.        Lactic acidosis of unclear etiology  *admission lactate 2.1, given 500 ml NS in ED with serial lactate overnight trending up  *admission UA wnl, COVID/flu/RSV negative; procal low  *admission CXR with infiltrate vs edema vs fibrosis  *reportedly had recent right knee steroid injection - no signs of septic arthritis on exam  - lactate 5.1 on admission, new leukocytosis 13.9 overnight but afebrile, is actually hypertensive, lactic acid levels did trend to 2.9 and again went back to 3.1.  -BNP elevated, patient noted to have possibly early diastolic dysfunction on the echocardiogram, EF is good.  Will stop IV fluids.  CPK within normal limit.  Pro-Kieran negative, blood cultures so far negative.  - no hypoxia or cough, LFTs within normal limit, patient afebrile, UA negative, white count normalized, antibiotics were discontinued on 6/27, on 6/28 1/2 blood cultures came back positive with gram-negative bacilli, I did restart Rocephin 2 g daily, repeat blood cultures obtained, CT abdomen pelvis ordered, will also request infectious disease input.  Family was updated.  -Plan is discharge to TCU when stable, tentatively 6/29, discussed  "with social work, patient currently lives in [place] assisted living so she could moved to [facility] when ready.     Generalized weakness  Failure to thrive  *resides at [facility] with ADL support  *Unclear if this is a significant change from baseline versus slow progressive decline with her history of parkinsonism  *thyroid studies as below, suspect sick euthyroid  PT/OT recommending TCU, social work involved.     Parkinson's disease  *experienced significant hypertension and sinus tachycardia in the ER - ?autonomic dysfunction  *per PharmD, was inappropriately receiving both Sinemet and Rytary at her AL since 6/3; intent was for Rytary alone  - continue PTA Rytary, discontinue Sinemet     Atrial fibrillation  *not on any AV lacey blockers  - continue PTA apixaban      Suspected sick euthyroid  *admission TSH 8.11 with normal free T4  - repeat studies in follow up with PCP in several weeks       Diet :Regular Diet Adult  Snacks/Supplements Adult: Ensure Enlive; Between Meals  DVT Prophylaxis: DOAC  Code Status: Full Code     52 MINUTES SPENT BY ME on the date of service doing chart review, history, exam, documentation & further activities per the note.  Medically Ready for Discharge: Anticipated Tomorrow    Clinically Significant Risk Factors               # Coagulation Defect: INR = 1.32 (Ref range: 0.85 - 1.15) and/or PTT = N/A, will monitor for bleeding    # Hypertension: Noted on problem list     # Dementia: noted on problem list          # Overweight: Estimated body mass index is 28.73 kg/m  as calculated from the following:    Height as of this encounter: 1.651 m (5' 5\").    Weight as of this encounter: 78.3 kg (172 lb 9.9 oz)., PRESENT ON ADMISSION       # Financial/Environmental Concerns: none         Cinthia Jarrell MD  Text Page   (7am to 6pm)    Interval History   Patient appeared to be confused, I contacted her son and discussed plan of care, today blood cultures from 6/26 came back +1/2 gram-negative " bacilli, antibiotics was discontinued 6/27 and plan was to discharge patient today but with this finding discharge is on hold, will request infectious disease input.    -Data reviewed today: I reviewed all new labs and imaging results over the last 24 hours.   Physical Exam     Vital Signs with Ranges  Temp:  [97.6  F (36.4  C)-98.5  F (36.9  C)] 97.6  F (36.4  C)  Pulse:  [] 75  Resp:  [16-18] 18  BP: (119-190)/() 119/57  SpO2:  [94 %-98 %] 96 %  I/O last 3 completed shifts:  In: 360 [P.O.:360]  Out: 1650 [Urine:1650]    Constitutional: Awake, alert, cooperative, no apparent distress  Respiratory: Clear to auscultation bilaterally, no crackles or wheezing  Cardiovascular: Regular rate and rhythm, normal S1 and S2, and no murmur noted  GI: Normal bowel sounds, soft, non-distended, non-tender  Skin/Integumen: No rashes, no cyanosis, no edema  Neuro : moving all 4 extremities, no focal deficit noted     Medications   Current Facility-Administered Medications   Medication Dose Route Frequency Provider Last Rate Last Admin    Patient is already receiving anticoagulation with heparin, enoxaparin (LOVENOX), warfarin (COUMADIN)  or other anticoagulant medication   Does not apply Continuous PRN Ehsan Bianchi MD         Current Facility-Administered Medications   Medication Dose Route Frequency Provider Last Rate Last Admin    acetaminophen (TYLENOL) tablet 1,000 mg  1,000 mg Oral BID Ehsan Bianchi MD   1,000 mg at 06/28/24 0844    apixaban ANTICOAGULANT (ELIQUIS) tablet 5 mg  5 mg Oral BID Ehsan Bianchi MD   5 mg at 06/28/24 0844    carbidopa-levodopa (RYTARY) 48. MG per CR capsule 3 capsule  3 capsule Oral TID Ehsan Bianchi MD   3 capsule at 06/28/24 0650    cefTRIAXone (ROCEPHIN) 2 g vial to attach to  ml bag for ADULTS or NS 50 ml bag for PEDS  2 g Intravenous Q24H Cinthia Jarrell MD   2 g at 06/28/24 1003    donepezil (ARICEPT) tablet 10 mg  10 mg Oral At Bedtime Ehsan Bianchi MD    10 mg at 06/27/24 2023    entacapone (COMTAN) tablet 200 mg  200 mg Oral TID Ehsan Bianchi MD   200 mg at 06/28/24 0845    sodium chloride (PF) 0.9% PF flush 3 mL  3 mL Intracatheter Q8H Rajat Rome MD   3 mL at 06/28/24 0845       Data   Recent Labs   Lab 06/28/24  0539 06/27/24  0623 06/26/24  1344 06/26/24  1156 06/26/24  0449 06/26/24  0126 06/25/24  0844   WBC 9.1 9.3  --   --  12.1*   < >  --    HGB 12.4 12.9  --   --  13.1   < >  --    MCV 91 91  --   --  93   < >  --     193  --   --  210   < >  --    INR  --   --   --  1.32*  --   --   --    NA  --  136  --   --  138  138  --  139  139   POTASSIUM  --  3.8  --   --  3.9  3.9  --  4.1  4.1   CHLORIDE  --  101  --   --  102  102  --  104  104   CO2  --  25  --   --  25  25  --  24  24   BUN  --  13.5  --   --  12.2  12.2  --  21.6  21.6   CR  --  0.51  --   --  0.57  0.57  --  0.43*  0.43*   ANIONGAP  --  10  --   --  11  11  --  11  11   SHANNON  --  9.3  --   --  9.1  9.1  --  9.4  9.4   GLC  --  124* 109*  --  123*  123*  --  109*  109*   ALBUMIN  --   --   --   --  3.9  --  4.0   PROTTOTAL  --   --   --   --  6.5  --  6.4   BILITOTAL  --   --   --   --  0.8  --  0.3   ALKPHOS  --   --   --   --  71  --  63   ALT  --   --   --   --  10  --  <5   AST  --   --   --   --  13  --  12    < > = values in this interval not displayed.     Recent Labs   Lab 06/27/24  0623 06/26/24  1344 06/26/24  0449 06/25/24  0844   * 109* 123*  123* 109*  109*       Imaging:   Recent Results (from the past 24 hour(s))   CT Abdomen Pelvis w Contrast    Narrative    CT ABDOMEN/PELVIS WITH CONTRAST June 28, 2024 10:49 AM    CLINICAL HISTORY: Gram negative bacteremia.    TECHNIQUE: CT scan of the abdomen and pelvis was performed following  injection of IV contrast. Multiplanar reformats were obtained. Dose  reduction techniques were used.  CONTRAST: 86 mL Isovue-370.    COMPARISON: None.    FINDINGS:   LOWER CHEST: Trace pleural  effusions.    HEPATOBILIARY: Normal.    PANCREAS: Normal.    SPLEEN: Normal.    ADRENAL GLANDS: Normal.    KIDNEYS/BLADDER: Normal.    BOWEL: No obstruction or inflammatory changes.    PELVIC ORGANS: Normal.    ADDITIONAL FINDINGS: None.    MUSCULOSKELETAL: Normal.      Impression    IMPRESSION: No acute inflammatory process in the abdomen and pelvis.

## 2024-06-29 NOTE — CONSULTS
[facility]    Infectious Disease Consultation     Date of Admission:  6/25/2024  Date of Consult (When I saw the patient): 06/29/24    Assessment & Plan   Jennifer Venegas is a 84 year old female who was admitted on 6/25/2024.     Impression: 1 84-year-old female with underlying Parkinson's disease, presented with apparent septic appearance, lactic acidosis, leukocytosis, no clear focal infection site workup essentially negative.  Clinically improved on antibiotics with still no clear source  2 single positive blood culture for gram-negative adryan, called the lab not growing an anaerobic cultures likely an anaerobe, probably real and transient, either abdomen, aspiration or similar.  Clinically improved on antibiotics mostly covering anaerobes initially Zosyn  3 Parkinson's disease  4 minimal historical infection issues occasional UTIs in the [facility] available records    REC 1 switch to oral Augmentin assuming blood culture was a transient anaerobic bacteremia either aspiration without major respiratory symptoms or abdominal issue that was transient i.e. negative scan and no symptoms would do 1 week of Augmentin  2 [facility] center follow her clinically, in addition I will follow-up on the final blood culture result and if it requires a change will call the facility and change  3 discussed in detail with son        Dashawn Ng MD    Reason for Consult   Reason for consult: I was asked to evaluate this patient for bacteremia.    Primary Care Physician   Sean Singh    Chief Complaint   Weakness    History is obtained from the patient and medical records    History of Present Illness   Jennifer Venegas is a 84 year old female who presents with acute weakness, mental status changes, lactic acidosis and mild leukocytosis.  No particular localizing infection signs and full workup here including imaging negative.  Only finding was a single blood culture bottle that grew a gram-negative adryan  from admission, follow-up cultures negative and clinically improved on antibiotics initially Zosyn then ceftriaxone.  I called the lab the organism is not growing aerobically probably is an anaerobe and likely transient anaerobic bacteremia.  Lab will continue to work on it.  Patient otherwise is clinically improved.    Past Medical History   I have reviewed this patient's medical history and updated it with pertinent information if needed.   Past Medical History:   Diagnosis Date    Acne rosacea     Anemia 1989    Arthritis     Basal cell carcinoma     Cataract, unspecified cataract type, unspecified laterality     Cervicalgia     Degeneration of cervical intervertebral disc 06/1999    C3-C7    Hearing loss     Hyperlipidemia     Lumbago     Osteoarthrosis     Pain in right shoulder     Parkinson's disease ([facility])     Resting tremor     Rhinitis, allergic     Right leg weakness     Varicose vein of leg        Past Surgical History   I have reviewed this patient's surgical history and updated it with pertinent information if needed.  Past Surgical History:   Procedure Laterality Date    APPENDECTOMY  1964    COLONOSCOPY  10/31/2006    EYE SURGERY  10/05/2011    blepharoplasty    GYN SURGERY  1974    Ligate fallopian tube    HERNIA REPAIR  1964    Incisional hernia, reducible    ORTHOPEDIC SURGERY Right 2005    arthroscopy of joint shoulder    ORTHOPEDIC SURGERY Left 04/10/2017    knee arthroplasty    REVERSE ARTHROPLASTY SHOULDER Right 10/25/2018    Procedure: RIGHT REVERSE TOTAL SHOULDER ARTHROPLASTY;  Surgeon: Edd Currie MD;  Location: [facility] OR    SIGMOIDOSCOPY FLEXIBLE  10/2000       Prior to Admission Medications   Prior to Admission Medications   Prescriptions Last Dose Informant Patient Reported? Taking?   SENNA-docusate sodium (SENNA S) 8.6-50 MG tablet   Yes Yes   Sig: Take 1 tablet by mouth 2 times daily as needed (constipation)   acetaminophen (TYLENOL) 500 MG tablet 6/25/2024 at 0700  No Yes   Sig: Take  2 tablets (1,000 mg) by mouth 2 times daily. May also take 2 tablets (1,000 mg) daily as needed for mild pain or fever.   apixaban ANTICOAGULANT (ELIQUIS) 5 MG tablet 6/25/2024 at 0700  Yes Yes   Sig: Take 1 tablet (5 mg) by mouth 2 times daily   calcium polycarbophil (FIBERCON) 625 MG tablet 6/25/2024 at 0700  Yes Yes   Sig: Take 2 tablets (1,250 mg) by mouth daily   carbidopa-levodopa (RYTARY) 48. MG CPCR per CR capsule 6/25/2024 at 0700 ([id number])  No Yes   Sig: Take 3 capsules by mouth 3 times daily   carbidopa-levodopa (SINEMET CR)  MG CR tablet 6/24/2024 at 1900-see note (intent was switch to Rytary)  Yes Yes   Sig: Take 1 tablet by mouth at bedtime At 7pm   carbidopa-levodopa (SINEMET CR)  MG CR tablet 6/24/2024 at 1900-see note (intent was switch to Rytary)  Yes Yes   Sig: Take 1 tablet by mouth at bedtime   carbidopa-levodopa (SINEMET)  MG tablet 6/25/2024 at 0700-see notes (intent was switch to Rytary)  Yes Yes   Sig: Take 1 tablet by mouth 3 times daily   donepezil (ARICEPT) 10 MG tablet 6/24/2024 at 1900  No Yes   Sig: Take 1 tablet (10 mg) by mouth at bedtime   entacapone (COMTAN) 200 MG tablet 6/25/2024 at 0700  No Yes   Sig: Take 1 tablet (200 mg) by mouth 3 times daily   hydrocortisone 2.5 % cream   Yes Yes   Sig: Apply topically daily as needed for other (hemorrhoids)   hydrocortisone 2.5 % cream   No Yes   Sig: APPLY RECTALLY TWICE DAILY UNTIL RESOLVED;& MAY APPLY RECTALLY TWICE DAILY AS NEEDED FOR HEMORRHOIDS, OK TO KEEP AT BEDSIDE.   metroNIDAZOLE (METROGEL) 1 % external gel   No Yes   Sig: Apply topically daily To forehead and face until resolved.  Avoid contact with eyes. Ok to keep in room   nystatin (MYCOSTATIN) 195390 UNIT/GM external powder   No Yes   Sig: Apply topically 2 times daily Apply under breast twice daily for rash flare until healed. Use as directed with any rash flare. Ok to keep bedside.      Facility-Administered Medications: None     Allergies    Allergies   Allergen Reactions    Other [Seasonal Allergies]      ragweed       Immunization History   Immunization History   Administered Date(s) Administered    COVID-19 Bivalent 18+ (Moderna) 10/24/2022    COVID-19 Monovalent 18+ (Moderna) 02/11/2021, 03/12/2021, 11/15/2021    Hepatitis A (ADULT 19+) 02/08/2007    Influenza Vaccine 65+ (FLUAD) 09/29/2021, 01/07/2022, 10/10/2022, 10/09/2023    Influenza Vaccine 65+ (Fluzone HD) 09/25/2020    Pneumo Conj 13-V (2010&after) 11/07/2014    Pneumococcal 23 valent 02/09/2007    TDAP (Adacel,Boostrix) 08/20/2021    Td (Adult), Adsorbed 09/19/2000, 11/30/2010    Typhoid IM 02/08/2007    Yellow Fever 02/08/2007    Zoster recombinant adjuvanted (SHINGRIX) 09/06/2019, 01/13/2020    Zoster vaccine, live 12/30/2008       Social History   I have reviewed this patient's social history and updated it with pertinent information if needed. Jennifer Venegas  reports that she has never smoked. She has never used smokeless tobacco. She reports current alcohol use. She reports that she does not use drugs.    Family History   I have reviewed this patient's family history and updated it with pertinent information if needed.   Family History   Problem Relation Age of Onset    Neurologic Disorder Mother     Parkinsonism Mother     Dementia Mother     Prostate Cancer Father     Hypertension Father     Prostate Cancer Brother     Heart Disease Brother        Review of Systems   The 10 point Review of Systems is negative    Physical Exam   Temp: 98  F (36.7  C) Temp src: Oral BP: (!) 143/92 Pulse: 79   Resp: 16 SpO2: 96 % O2 Device: None (Room air)    Vital Signs with Ranges  Temp:  [97.6  F (36.4  C)-98.2  F (36.8  C)] 98  F (36.7  C)  Pulse:  [] 79  Resp:  [16-20] 16  BP: (113-164)/() 143/92  SpO2:  [93 %-99 %] 96 %  173 lbs .98 oz  Body mass index is 28.8 kg/m .    GENERAL APPEARANCE:  awake mild confusion, looks mildly chronically ill but not particular acutely ill, blood  "pressure okay etc.  EYES: Eyes grossly normal to inspection  NECK: no adenopathy  RESP: lungs clear   CV: regular rates and rhythm  LYMPHATICS: normal ant/post cervical and supraclavicular nodes  ABDOMEN: soft, nontender  MS: extremities normal  SKIN: no suspicious lesions or rashes        Data   All laboratory and imaging data in the past 24 hours reviewed  No results for input(s): \"CULT\" in the last 168 hours.  No lab results found.    Invalid input(s): \"UC\"       All cultures:  Recent Labs   Lab 06/28/24  1300 06/28/24  1249 06/26/24  1211 06/26/24  1156 06/25/24  1150 06/25/24  0844   CULTURE No growth after 12 hours No growth after 12 hours No growth after 2 days Positive on the 1st day of incubation*  Gram negative bacilli* No growth after 3 days No growth after 3 days      Blood culture:  Results for orders placed or performed during the hospital encounter of 06/25/24   Blood Culture Arm, Right    Specimen: Arm, Right; Blood   Result Value Ref Range    Culture No growth after 12 hours    Blood Culture Hand, Left    Specimen: Hand, Left; Blood   Result Value Ref Range    Culture No growth after 12 hours    Blood Culture Arm, Right    Specimen: Arm, Right; Blood   Result Value Ref Range    Culture Positive on the 1st day of incubation (A)     Culture Gram negative bacilli (AA)    Blood Culture Arm, Right    Specimen: Arm, Right; Blood   Result Value Ref Range    Culture No growth after 2 days    Blood Culture Peripheral Blood    Specimen: Peripheral Blood   Result Value Ref Range    Culture No growth after 3 days    Blood Culture Peripheral Blood    Specimen: Peripheral Blood   Result Value Ref Range    Culture No growth after 3 days       Urine culture:  No results found for this or any previous visit.          "

## 2024-06-29 NOTE — DISCHARGE SUMMARY
"[facility]  Hospitalist Discharge Summary      Date of Admission:  6/25/2024  Date of Discharge:  6/29/2024  Discharging Provider: Danilo Morrow DO  Discharge Service: Hospitalist Service    Discharge Diagnoses   Possible sepsis of unclear source  GNR bacteremia  Lactic acidosis    Clinically Significant Risk Factors     # Overweight: Estimated body mass index is 28.8 kg/m  as calculated from the following:    Height as of this encounter: 1.651 m (5' 5\").    Weight as of this encounter: 78.5 kg (173 lb 1 oz).       Follow-ups Needed After Discharge   Follow-up Appointments     Follow Up and recommended labs and tests      Follow up with prison physician.  The following labs/tests are   recommended: get a lactic acid level in 3-5 days.            Unresulted Labs Ordered in the Past 30 Days of this Admission       Date and Time Order Name Status Description    6/28/2024  9:47 AM Blood Culture Arm, Right Preliminary     6/28/2024  9:47 AM Blood Culture Hand, Left Preliminary     6/26/2024 10:28 AM Blood Culture Arm, Right Preliminary     6/26/2024 10:28 AM Blood Culture Arm, Right Preliminary     6/25/2024  9:03 AM Blood Culture Peripheral Blood Preliminary     6/25/2024  9:03 AM Blood Culture Peripheral Blood Preliminary         These results will be followed up by PCP    Discharge Disposition   Discharged to rehabilitation facility  Condition at discharge: Stable    Hospital Course   Lactic acidosis of unclear etiology  Possible sepsis  GNR bacteremia  *admission lactate 2.1, given 500 ml NS in ED with serial lactate overnight trending up  *admission UA wnl, COVID/flu/RSV negative; procal low  *admission CXR with infiltrate vs edema vs fibrosis  *reportedly had recent right knee steroid injection - no signs of septic arthritis on exam  * lactate 5.1 on admission, new leukocytosis 13.9 overnight but afebrile, is actually hypertensive, lactic acid levels did trend to 2.9 and again " went back to 3.1.  * negative PCT and WBC elevation resolved  * blood culture positive on 6/26 for GNR but not growing well  * ID consulted and recommended 1 week of augmentin  * overall source of infection is not clear. No acute issue on CT A/P. No signs of local tissue malperfusion of the extremities or bowel. Patient herself is without complaints on discharge and requesting discharge from the hospital  Plan  - augmentin x 1 week for possible transient anaerobic bacteremia of abdomen or aspiration source but no clear. Appreciate ID seeing  - will ask care center to repeat lactic acid level in 3-5 days. At this point there are no acute symptoms the patient endorsing.      Generalized weakness  Failure to thrive  *resides at [facility] with ADL support  *Unclear if this is a significant change from baseline versus slow progressive decline with her history of parkinsonism  *thyroid studies as below, suspect sick euthyroid  PT/OT recommending TCU, social work involved, will go to TCU today     Parkinson's disease  *experienced significant hypertension and sinus tachycardia in the ER - ?autonomic dysfunction  *per PharmD, was inappropriately receiving both Sinemet and Rytary at her [facility] since 6/3; intent was for Rytary alone  - continue PTA Rytary, discontinue Sinemet     Atrial fibrillation  *not on any AV lacey blockers  - continue PTA apixaban      Suspected sick euthyroid  *admission TSH 8.11 with normal free T4  - repeat studies in follow up with PCP in several weeks    Consultations This Hospital Stay   CARE MANAGEMENT / SOCIAL WORK IP CONSULT  PHYSICAL THERAPY ADULT IP CONSULT  OCCUPATIONAL THERAPY ADULT IP CONSULT  SOCIAL WORK IP CONSULT  INFECTIOUS DISEASES IP CONSULT  PHYSICAL THERAPY ADULT IP CONSULT  OCCUPATIONAL THERAPY ADULT IP CONSULT  SPEECH LANGUAGE PATH ADULT IP CONSULT    Code Status   Full Code    Time Spent on this Encounter   IDanilo DO, personally saw the patient today and spent greater  than 30 minutes discharging this patient.       Danilo Morrow [facility]  [address] [place] [address]  Phone: [phone]  ______________________________________________________________________    Physical Exam   Vital Signs: Temp: 97.9  F (36.6  C) Temp src: Oral BP: (!) 143/92 Pulse: 79   Resp: 16 SpO2: 96 % O2 Device: None (Room air)    Weight: 173 lbs .98 oz  GENERAL APPEARANCE:  awake mild confusion, pleasant and alert in no acute distress  EYES: Eyes grossly normal to inspection  NECK: no adenopathy  RESP: lungs clear   CV: regular rates and rhythm  LYMPHATICS: normal ant/post cervical and supraclavicular nodes  ABDOMEN: soft, nontender  MS: extremities normal  SKIN: no suspicious lesions or rashes       Primary Care Physician   Sean Singh    Discharge Orders      General info for SNF    Length of Stay Estimate: Short Term Care: Estimated # of Days 31-90  Condition at Discharge: Stable  Level of care:skilled   Rehabilitation Potential: Fair  Admission H&P remains valid and up-to-date: Yes  Recent Chemotherapy: N/A  Use Nursing Home Standing Orders: Yes     Mantoux instructions    Give two-step Mantoux (PPD) Per Facility Policy Yes     Follow Up and recommended labs and tests    Follow up with group home physician.  The following labs/tests are recommended: get a lactic acid level in 3-5 days.     Reason for your hospital stay    Possible sepsis of unclear cause  Blood stream infection     Activity - Up with nursing assistance     Physical Therapy Adult Consult    Evaluate and treat as clinically indicated.    Reason:  deconditioning     Occupational Therapy Adult Consult    Evaluate and treat as clinically indicated.    Reason:  deconditioning     Speech Language Path Adult Consult    Evaluate and treat as clinically indicated.    Reason:  deconditioning     Diet    Follow this diet upon discharge: Orders Placed This Encounter       Snacks/Supplements Adult: Ensure Enlive; Between Meals      Room Service      Regular Diet Adult       Significant Results and Procedures   Most Recent 3 CBC's:  Recent Labs   Lab Test 06/28/24  0539 06/27/24  0623 06/26/24  0449   WBC 9.1 9.3 12.1*   HGB 12.4 12.9 13.1   MCV 91 91 93    193 210     Most Recent 3 BMP's:  Recent Labs   Lab Test 06/27/24  0623 06/26/24  1344 06/26/24  0449 06/25/24  0844     --  138  138 139  139   POTASSIUM 3.8  --  3.9  3.9 4.1  4.1   CHLORIDE 101  --  102  102 104  104   CO2 25  --  25 25 24 24   BUN 13.5  --  12.2  12.2 21.6  21.6   CR 0.51  --  0.57  0.57 0.43*  0.43*   ANIONGAP 10  --  11  11 11  11   SHANNON 9.3  --  9.1  9.1 9.4  9.4   * 109* 123*  123* 109*  109*   ,   Results for orders placed or performed during the hospital encounter of 06/25/24   Chest XR,  PA & LAT    Narrative    CHEST TWO VIEWS  6/25/2024 11:25 AM     HISTORY: Confusion.    COMPARISON: None.      Impression    IMPRESSION: Mild interstitial prominence throughout both lungs may be  fibrotic, although infection or mild edema could also have this  appearance. There is mild pulmonary venous congestion. Tortuous  calcified thoracic aorta. No pleural effusions. No pneumothorax. Right  shoulder arthroplasty.    MARY HERNÁNDEZ MD         SYSTEM ID:  [id number]   XR Chest Port 1 View    Narrative    XR CHEST PORT 1 VIEW 6/26/2024 10:18 AM    HISTORY: elevated lactic acid with rhales    COMPARISON: 6/25/2024      Impression    IMPRESSION:Stable mild linear atelectasis in the left lung base.  Otherwise, no focal airspace opacities, pleural effusion or  pneumothorax. The cardiac and mediastinal silhouettes are normal. A  right shoulder arthroplasty.    SHANE SHANNON MD         SYSTEM ID:  [id number]   CT Abdomen Pelvis w Contrast    Narrative    CT ABDOMEN/PELVIS WITH CONTRAST June 28, 2024 10:49 AM    CLINICAL HISTORY: Gram negative bacteremia.    TECHNIQUE: CT scan of the  abdomen and pelvis was performed following  injection of IV contrast. Multiplanar reformats were obtained. Dose  reduction techniques were used.  CONTRAST: 86 mL Isovue-370.    COMPARISON: None.    FINDINGS:   LOWER CHEST: Trace pleural effusions.    HEPATOBILIARY: Normal.    PANCREAS: Normal.    SPLEEN: Normal.    ADRENAL GLANDS: Normal.    KIDNEYS/BLADDER: Normal.    BOWEL: No obstruction or inflammatory changes.    PELVIC ORGANS: Normal.    ADDITIONAL FINDINGS: None.    MUSCULOSKELETAL: Normal.      Impression    IMPRESSION: No acute inflammatory process in the abdomen and pelvis.    YANETH SOLORZANO MD         SYSTEM ID:  [id number]   Echocardiogram Complete     Value    LVEF  55-60%    [facility]    [id number]  [id number]  [id number]  [id number]^DEANDRE^NATHAN     [facility]  Echocardiography Laboratory  [address] [place] [address]     Name: FLAQUITO PALAFOX  MRN: [id number]  : 1940  Study Date: 2024 10:55 AM  Age: 84 yrs  Gender: Female  Patient Location: [facility]  Reason For Study: Dizziness  Ordering Physician: NATHAN CARRERA  Referring Physician: Sean Singh  Performed By: Bryson Whalen     BSA: 1.8 m2  Height: 65 in  Weight: 168 lb  HR: 69  BP: 113/69 mmHg  ______________________________________________________________________________  Procedure  Complete Portable Echo Adult. Complete Echo Adult.  ______________________________________________________________________________  Interpretation Summary     The visual ejection fraction is 55-60%.  Diastolic Doppler findings (E/E' ratio and/or other parameters) suggest left  ventricular filling pressures are increased.  No regional wall motion abnormalities noted.  The right ventricle is normal in size and function.  There is moderate mitral annular calcification.  No hemodynamically significant valvular heart disease.  ______________________________________________________________________________  Left Ventricle  The left  ventricle is normal in size. There is normal left ventricular wall  thickness. Diastolic Doppler findings (E/E' ratio and/or other parameters)  suggest left ventricular filling pressures are increased. The visual ejection  fraction is 55-60%. No regional wall motion abnormalities noted.     Right Ventricle  The right ventricle is normal in size and function.     Atria  The left atrium is severely dilated. Right atrial size is normal. There is no  color Doppler evidence of an atrial shunt.     Mitral Valve  The mitral valve leaflets are mildly thickened. There is moderate mitral  annular calcification. There is trace mitral regurgitation. The mean mitral  valve gradient is 2.1 mmHg.     Tricuspid Valve  There is trace tricuspid regurgitation. The right ventricular systolic  pressure is approximated at 35.7 mmHg plus the right atrial pressure. IVC  diameter <2.1 cm collapsing >50% with sniff suggests a normal RA pressure of 3  mmHg.     Aortic Valve  There is trivial trileaflet aortic sclerosis. No aortic regurgitation is  present. No hemodynamically significant valvular aortic stenosis.     Pulmonic Valve  There is trace pulmonic valvular regurgitation.     Vessels  Normal size aorta. The aortic root is normal size.     Pericardium  There is no pericardial effusion.     Rhythm  Sinus rhythm was noted. The patient exhibited frequent PACs.  ______________________________________________________________________________  MMode/2D Measurements & Calculations  IVSd: 1.0 cm     LVIDd: 4.0 cm  LVIDs: 3.2 cm  LVPWd: 1.2 cm  FS: 20.3 %  LV mass(C)d: 148.1 grams  LV mass(C)dI: 80.6 grams/m2  Ao root diam: 3.7 cm  LA dimension: 3.6 cm  asc Aorta Diam: 3.3 cm  LA/Ao: 0.96  LVOT diam: 2.0 cm  LVOT area: 3.3 cm2  Ao root diam index Ht(cm/m): 2.3  Ao root diam index BSA (cm/m2): 2.0  Asc Ao diam index BSA (cm/m2): 1.8  Asc Ao diam index Ht(cm/m): 2.0  LA Volume (BP): 103.0 ml     LA Volume Index (BP): 56.0 ml/m2  RWT: 0.59  TAPSE:  2.7 cm     Doppler Measurements & Calculations  MV E max vlad: 102.0 cm/sec  MV A max vlad: 90.0 cm/sec  MV E/A: 1.1  MV max P.0 mmHg  MV mean P.1 mmHg  MV V2 VTI: 41.1 cm  MV dec slope: 600.8 cm/sec2  MV dec time: 0.17 sec  PA acc time: 0.14 sec  TR max vlad: 298.6 cm/sec  TR max P.7 mmHg  E/E' av.2  Lateral E/e': 17.0  Medial E/e': 21.3  RV S Vlad: 12.4 cm/sec     ______________________________________________________________________________  Report approved by: Christina Navarro 2024 03:33 PM             Discharge Medications   Current Discharge Medication List        START taking these medications    Details   amoxicillin-clavulanate (AUGMENTIN) 875-125 MG tablet Take 1 tablet by mouth every 12 hours for 7 days    Associated Diagnoses: Paroxysmal atrial fibrillation (H)           CONTINUE these medications which have NOT CHANGED    Details   acetaminophen (TYLENOL) 500 MG tablet Take 2 tablets (1,000 mg) by mouth 2 times daily. May also take 2 tablets (1,000 mg) daily as needed for mild pain or fever.  Qty: 264 tablet, Refills: 11    Associated Diagnoses: Generalized pain      apixaban ANTICOAGULANT (ELIQUIS) 5 MG tablet Take 1 tablet (5 mg) by mouth 2 times daily      calcium polycarbophil (FIBERCON) 625 MG tablet Take 2 tablets (1,250 mg) by mouth daily      carbidopa-levodopa (RYTARY) 48. MG CPCR per CR capsule Take 3 capsules by mouth 3 times daily  Qty: 270 capsule, Refills: 3    Associated Diagnoses: Parkinson's disease without dyskinesia, with fluctuating manifestations (H)      carbidopa-levodopa (SINEMET CR)  MG CR tablet Take 1 tablet by mouth at bedtime At 7pm    Associated Diagnoses: Parkinson's disease with dyskinesia and fluctuating manifestations (H)      carbidopa-levodopa (SINEMET CR)  MG CR tablet Take 1 tablet by mouth at bedtime      carbidopa-levodopa (SINEMET)  MG tablet Take 1 tablet by mouth 3 times daily      donepezil (ARICEPT) 10 MG tablet  Take 1 tablet (10 mg) by mouth at bedtime  Qty: 30 tablet, Refills: 3    Associated Diagnoses: Parkinson's disease without dyskinesia, with fluctuating manifestations (H)      entacapone (COMTAN) 200 MG tablet Take 1 tablet (200 mg) by mouth 3 times daily  Qty: 270 tablet, Refills: 3    Associated Diagnoses: Parkinson's disease without dyskinesia, with fluctuating manifestations (H)      !! hydrocortisone 2.5 % cream APPLY RECTALLY TWICE DAILY UNTIL RESOLVED;& MAY APPLY RECTALLY TWICE DAILY AS NEEDED FOR HEMORRHOIDS, OK TO KEEP AT BEDSIDE.  Qty: 28.35 g, Refills: 97    Associated Diagnoses: External hemorrhoids      !! hydrocortisone 2.5 % cream Apply topically daily as needed for other (hemorrhoids)      metroNIDAZOLE (METROGEL) 1 % external gel Apply topically daily To forehead and face until resolved.  Avoid contact with eyes. Ok to keep in room  Qty: 60 g, Refills: 4    Associated Diagnoses: Rosacea      nystatin (MYCOSTATIN) 852563 UNIT/GM external powder Apply topically 2 times daily Apply under breast twice daily for rash flare until healed. Use as directed with any rash flare. Ok to keep bedside.  Qty: 30 g, Refills: 11    Associated Diagnoses: Rash      SENNA-docusate sodium (SENNA S) 8.6-50 MG tablet Take 1 tablet by mouth 2 times daily as needed (constipation)    Associated Diagnoses: Slow transit constipation       !! - Potential duplicate medications found. Please discuss with provider.        Allergies   Allergies   Allergen Reactions    Other [Seasonal Allergies]      ragweed

## 2024-06-29 NOTE — PROGRESS NOTES
Care Management Discharge Note    Discharge Date: 06/29/2024       Discharge Disposition: Transitional Care    Discharge Services: None    Discharge DME: None    Discharge Transportation: family or friend will provide    Private pay costs discussed: transportation costs    Does the patient's insurance plan have a 3 day qualifying hospital stay waiver?  No    PAS Confirmation Code: [id number]  Patient/family educated on Medicare website which has current facility and service quality ratings: yes    Education Provided on the Discharge Plan: Yes  Persons Notified of Discharge Plans: Nurse, Provider, facility, family  Patient/Family in Agreement with the Plan: yes    Handoff Referral Completed: No    Additional Information:  Writer sent discharge orders to [facility]. Spoke with facility and family and arranged transportation. Safe discharge plan was followed.     OMAIRA Calles

## 2024-06-29 NOTE — PLAN OF CARE
Reason for Admission: Lactic acidosis of unclear etiology; Possible sepsis; GNR bacteremia; Generalized weakness; Failure to thrive    Cognitive/Mentation: A/Ox 3; Disoriented to situation  Neuros/CMS: Intact ex BLE edema  VS: Mildly hypertensive at times; otherwise, VSS on RA.   Tele: SR with frequent PACs.  /GI: Incontinent B& B. Last BM 6/29.   Pulmonary: LS clear.  Pain: Denies.     Drains/Lines: PIV SL x1  Skin: Right calf skin tear present; mepilex dressing in place.  Coccyx blanchable redness present.  Activity: Assist x 2 with walker.  Diet: Regular with thin liquids. Takes pills whole, one at a time in pudding.     Therapies recs: TCU  Discharge: [facility] @1300    Aggression Stoplight Tool: Green    End of shift summary: Denies chest pain, SOB/HALL, lightheadedness/dizziness, or nausea/vomiting.  Return to [facility] this afternoon in stable condition.

## 2024-06-29 NOTE — PLAN OF CARE
[id number]  Orientations: A&O x3, disoriented to situation   Vitals/Pain: VSS on RA. HTN at times. Denies pain  Tele: SR w/PACs  Lines/Drains: 1 PIV, SL  Skin/Wounds: Blanchable redness on coccyx, mepilex in place. R-calf skin tear.  GI/: Incontinent of B/B. Few brief changes overnight. No BM. BS audible, +flatus.   Labs: pending blood cultures   Ambulation/Assist: Ax2 pivot to commode. Q2h turn and repo   Sleep Quality: good   Plan: Continue plan of care       Outcome Evaluation: positive Blood cultures

## 2024-07-01 NOTE — PROGRESS NOTES
[facility]    Background: Transitional Care Management program identified per system criteria and reviewed by [facility] team for possible outreach.    Assessment: Upon chart review, CCR Team member will not proceed with patient outreach related to this episode of Transitional Care Management program due to reason below:    [facility] TCU: Patient discharged to TCU/ARU/LTACH and is established within [facility]. Referral created for Primary Care-Care Coordination program.    Plan: Transitional Care Management episode addressed appropriately per reason noted above.      RUBI Smith  [phone]  [facility]     *Connected Care Resource Team does NOT follow patient ongoing. Referrals are identified based on internal discharge reports and the outreach is to ensure patient has an understanding of their discharge instructions.

## 2024-07-01 NOTE — PLAN OF CARE
Physical Therapy Discharge Summary    Reason for therapy discharge:    Discharged to transitional care facility.    Progress towards therapy goal(s). See goals on Care Plan in [facility] electronic health record for goal details.  Goals not met.  Barriers to achieving goals:   discharge from facility.    Therapy recommendation(s):    Continued therapy is recommended.  Rationale/Recommendations:  To progress independence and safety with functional mobility.

## 2024-07-01 NOTE — PROGRESS NOTES
[facility] GERIATRICS    PRIMARY CARE PROVIDER AND CLINIC:  Sean Singh, NAREN CNP, [address] [facility] [id number]  Chief Complaint   Patient presents with    Hospital F/U      [place] Medical Record Number:  [id number]  Place of Service where encounter took place:  [facility] (TCU) [[id number]]    Jennifer Venegas  is a 84 year old  (1940), admitted to the above facility from  [facility]. Hospital stay 6/25/24 through 6/29/24..   HPI:    84 year old female PMH parkinsonism hospitalized with general weakness and significant difficulty with ambulation.  In the ER required IV labetalol due to HTN and tachycardia. 1/2 BC positive with GNR bacteremia, WBC up to 13.9 and treated with rocephin then one week of Augmentin. Source of infection not clear. Parkinson's: on rytary. A fib on apixaban. Sick euthyroid with TSH 8.11 and normal free T4 needs a recheck. To TCU for rehab.     Patient is seen for initial TCU visit, history obtained from patient, staff and extensive review of the chart. Asks to be DNR/DNI. Denies pain. No headaches, dizziness, chest pain, dyspnea, bowel or bladder issues. BP range 119-177/ and sats 99%. HR 90s. Reports fatigue and weakness       CODE STATUS/ADVANCE DIRECTIVES DISCUSSION:  No CPR- Do NOT Intubate    ALLERGIES:   Allergies   Allergen Reactions    Other [Seasonal Allergies]      ragweed      PAST MEDICAL HISTORY:   Past Medical History:   Diagnosis Date    Acne rosacea     Anemia 1989    Arthritis     Basal cell carcinoma     Cataract, unspecified cataract type, unspecified laterality     Cervicalgia     Degeneration of cervical intervertebral disc 06/1999    C3-C7    Hearing loss     Hyperlipidemia     Lumbago     Osteoarthrosis     Pain in right shoulder     Parkinson's disease (H)     Resting tremor     Rhinitis, allergic     Right leg weakness     Varicose vein of leg       PAST SURGICAL HISTORY:   has a past surgical history  that includes appendectomy (1964); colonoscopy (10/31/2006); orthopedic surgery (Right, 2005); orthopedic surgery (Left, 04/10/2017); GYN surgery (1974); hernia repair (1964); Sigmoidoscopy flexible (10/2000); Eye surgery (10/05/2011); and Reverse arthroplasty shoulder (Right, 10/25/2018).  FAMILY HISTORY: family history includes Dementia in her mother; Heart Disease in her brother; Hypertension in her father; Neurologic Disorder in her mother; Parkinsonism in her mother; Prostate Cancer in her brother and father.  SOCIAL HISTORY:   reports that she has never smoked. She has never used smokeless tobacco. She reports current alcohol use. She reports that she does not use drugs.  Patient's living condition: lives in an assisted living facility    Post Discharge Medication Reconciliation Status:   MED REC REQUIRED  Post Medication Reconciliation Status: discharge medications reconciled and changed, per note/orders       Current Outpatient Medications   Medication Sig Dispense Refill    acetaminophen (TYLENOL) 500 MG tablet Take 2 tablets (1,000 mg) by mouth 2 times daily. May also take 2 tablets (1,000 mg) daily as needed for mild pain or fever. 264 tablet 11    amoxicillin-clavulanate (AUGMENTIN) 875-125 MG tablet Take 1 tablet by mouth every 12 hours for 7 days      apixaban ANTICOAGULANT (ELIQUIS) 5 MG tablet Take 1 tablet (5 mg) by mouth 2 times daily      calcium polycarbophil (FIBERCON) 625 MG tablet Take 2 tablets (1,250 mg) by mouth daily      carbidopa-levodopa (RYTARY) 48. MG CPCR per CR capsule Take 3 capsules by mouth 3 times daily 270 capsule 3    donepezil (ARICEPT) 10 MG tablet Take 1 tablet (10 mg) by mouth at bedtime 30 tablet 3    entacapone (COMTAN) 200 MG tablet Take 1 tablet (200 mg) by mouth 3 times daily 270 tablet 3    hydrocortisone 2.5 % cream APPLY RECTALLY TWICE DAILY UNTIL RESOLVED;& MAY APPLY RECTALLY TWICE DAILY AS NEEDED FOR HEMORRHOIDS, OK TO KEEP AT BEDSIDE. 28.35 g 97     "hydrocortisone 2.5 % cream Apply topically daily as needed for other (hemorrhoids)      metroNIDAZOLE (METROGEL) 1 % external gel Apply topically daily To forehead and face until resolved.  Avoid contact with eyes. Ok to keep in room 60 g 4    nystatin (MYCOSTATIN) 741684 UNIT/GM external powder Apply topically 2 times daily Apply under breast twice daily for rash flare until healed. Use as directed with any rash flare. Ok to keep bedside. 30 g 11    SENNA-docusate sodium (SENNA S) 8.6-50 MG tablet Take 1 tablet by mouth 2 times daily as needed (constipation)       No current facility-administered medications for this visit.       ROS:  10 point ROS of systems including Constitutional, Eyes, Respiratory, Cardiovascular, Gastroenterology, Genitourinary, Integumentary, Musculoskeletal, Psychiatric were all negative except for pertinent positives noted in my HPI.    Vitals:  /63   Pulse 92   Temp 98  F (36.7  C)   Resp 18   Ht 1.549 m (5' 1\")   Wt 76.5 kg (168 lb 9.6 oz)   SpO2 93%   BMI 31.86 kg/m    Exam:  GENERAL APPEARANCE:  Alert, in no distress, pleasant, cooperative, oriented x self and place. Forgetful, resting in bed  EYES:  EOM, lids, pupils and irises normal, sclera clear and conjunctiva normal, no discharge or mattering on lids or lashes noted  ENT:  Mouth normal, moist mucous membranes, nose normal without drainage or crusting, external ears without lesions, hearing acuity intact  NECK: supple, symmetrical, trachea midline  RESP:  respiratory effort normal, no chest wall tenderness, no respiratory distress, Lung sounds clear, patient is on RA  CV:  Auscultation of heart done, rate and rhythm controlled and irregular, no murmur, no rub or gallop. Edema trace bilateral lower extremities  ABDOMEN:  normal bowel sounds, soft, nontender, no palpable masses.  M/S:   Gait and station unsafe without assistance, no tenderness or swelling of the joints; able to move all extremities, digits " normal  NEURO: cranial nerves 2-12 grossly intact, no facial asymmetry, no speech deficits and able to follow directions, moves all extremities symmetrically  PSYCH:  insight and judgement and memory impaired, affect and mood normal     Lab/Diagnostic data:  Most Recent 3 CBC's:  Recent Labs   Lab Test 06/28/24  0539 06/27/24  0623 06/26/24  0449   WBC 9.1 9.3 12.1*   HGB 12.4 12.9 13.1   MCV 91 91 93    193 210     Most Recent 3 BMP's:  Recent Labs   Lab Test 06/27/24  0623 06/26/24  1344 06/26/24  0449 06/25/24  0844     --  138  138 139  139   POTASSIUM 3.8  --  3.9  3.9 4.1  4.1   CHLORIDE 101  --  102  102 104  104   CO2 25  --  25 25 24  24   BUN 13.5  --  12.2  12.2 21.6  21.6   CR 0.51  --  0.57  0.57 0.43*  0.43*   ANIONGAP 10  --  11  11 11  11   SHANNON 9.3  --  9.1  9.1 9.4  9.4   * 109* 123*  123* 109*  109*     Most Recent TSH and T4:  Recent Labs   Lab Test 06/25/24  1822 06/25/24  1507   TSH  --  8.11*   T4 1.01 0.97       ASSESSMENT/PLAN:  Bacteremia  Acute, improving. Plan to check CBC on 7/3. Complete Augmentin course 875 mg BID x 7 days. Monitor for fevers or other s/s infection.    Generalized muscle weakness  OA  Parkinson's disease without dyskinesia, with fluctuating manifestations (H)  Acute on chronic. Continue tylenol 1000 mg BID, rytary 48. mg capsules 3 capsules TID, entacapone 200 mg TID. Stop all sinemet orders. Therapies eval and treat and follow-up progress. Neuro follow-up per home routine.     Paroxysmal atrial fibrillation (H)  Hypertension, unspecified type  Acute on chronic. Monitor BPs and HR. Continue apixaban 5 mg BID. Follow-up vs ranges next visit.     Sick euthyroidism  Noted at hospital. Repeat thyroid function tests 4-6 weeks.     Senile dementia without behavioral disturbance (H)  Chronic, continue Aricept 10 mg HS, monitor for safety.     Slow transit constipation  Continue senna s 1 tab BID PRN constipation    Advanced  directives, counseling/discussion  Asks to be DNR/DNI    Orders:  DNR/DNI  CBC on 7/3 diagnosis bacteremia  Stop all sinemet orders. Continue Rytary as ordered    Electronically signed by:  NAREN Coffey CNP

## 2024-07-05 NOTE — PROGRESS NOTES
"[facility] GERIATRICS    Chief Complaint   Patient presents with    RECHECK     HPI:  Jennifer Venegas is a 84 year old  (1940), who is being seen today for an episodic care visit at: [facility] (TCU) [[id number]].     Per recent TCU provider progress notes:   84 year old female PMH parkinsonism hospitalized with general weakness and significant difficulty with ambulation.  In the ER required IV labetalol due to HTN and tachycardia. 1/2 BC positive with GNR bacteremia, WBC up to 13.9 and treated with rocephin then one week of Augmentin. Source of infection not clear. Parkinson's: on rytary. A fib on apixaban. Sick euthyroid with TSH 8.11 and normal free T4 needs a recheck. To TCU for rehab.    Today's concern is: episodic follow-up mobility, vs, labs. Transfers with EZ stand. SLUMS 11/30. BP range 127-164/61-98 and sats 95% room air. Reports occasional dyspnea with exertion. No pain, headaches, dizziness, chest pain, bowel issues.     Allergies, and PMH/PSH reviewed in [facility] today.  REVIEW OF SYSTEMS:  4 point ROS including Respiratory, CV, GI and , other than that noted in the HPI,  is negative    Objective:   BP (!) 147/86   Pulse 60   Temp 97.8  F (36.6  C)   Resp 18   Ht 1.549 m (5' 1\")   Wt 76.2 kg (168 lb)   SpO2 95%   BMI 31.74 kg/m    GENERAL APPEARANCE:  Alert, in no distress, pleasant, cooperative, oriented x self and place. Forgetful, resting in bed  EYES:  EOM, lids, pupils and irises normal, sclera clear and conjunctiva normal, no discharge or mattering on lids or lashes noted  ENT:  Mouth normal, moist mucous membranes, nose normal without drainage or crusting, external ears without lesions, hearing acuity intact  RESP:  respiratory effort normal, no chest wall tenderness, no respiratory distress, Lung sounds clear, patient is on RA  CV:  Auscultation of heart done, rate and rhythm controlled and irregular, no murmur, no rub or gallop. Edema trace bilateral lower extremities  M/S:   " Gait and station unsafe without assistance, no tenderness or swelling of the joints; able to move all extremities, digits normal  NEURO: cranial nerves 2-12 grossly intact, no facial asymmetry, no speech deficits and able to follow directions, moves all extremities symmetrically  PSYCH:  insight and judgement and memory impaired, affect and mood normal     Most Recent 3 CBC's:  Recent Labs   Lab Test 07/03/24  0640 06/28/24  0539 06/27/24  0623   WBC 9.1 9.1 9.3   HGB 11.8 12.4 12.9   MCV 93 91 91    217 193     Most Recent 3 BMP's:  Recent Labs   Lab Test 06/27/24  0623 06/26/24  1344 06/26/24  0449 06/25/24  0844     --  138  138 139  139   POTASSIUM 3.8  --  3.9  3.9 4.1  4.1   CHLORIDE 101  --  102  102 104  104   CO2 25  --  25  25 24  24   BUN 13.5  --  12.2  12.2 21.6  21.6   CR 0.51  --  0.57  0.57 0.43*  0.43*   ANIONGAP 10  --  11  11 11  11   SHANNON 9.3  --  9.1  9.1 9.4  9.4   * 109* 123*  123* 109*  109*       Assessment/Plan:  Bacteremia  Acute, improving. WBC 9.1 on 7/3. Completed Augmentin course. Monitor for fevers or other s/s infection.    Generalized muscle weakness  OA  Parkinson's disease without dyskinesia, with fluctuating manifestations (H)  Acute on chronic. Continue tylenol 1000 mg BID, rytary 48. mg capsules 3 capsules TID, entacapone 200 mg TID. Stopped all sinemet orders. Therapies eval and treat and follow-up progress. Neuro follow-up per home routine.     Paroxysmal atrial fibrillation (H)  Hypertension, unspecified type  Acute on chronic. Monitor BPs and HR. Continue apixaban 5 mg BID. Follow-up vs ranges next visit.     Sick euthyroidism  Noted at hospital. Repeat thyroid function tests 4-6 weeks.     Senile dementia without behavioral disturbance (H)  Chronic, continue Aricept 10 mg HS, monitor for safety.     Slow transit constipation  Continue senna s 1 tab BID PRN constipation, effective    MED REC REQUIRED  Post Medication Reconciliation  Status: discharge medications reconciled, continue medications without change    Orders:  No new orders    Electronically signed by: NAREN Coffey CNP

## 2024-07-09 NOTE — TELEPHONE ENCOUNTER
Good morning Marcia,    Patient's son, Philip, is requesting a call back from you. His number is [phone].    Thanks!  Shabbir Maloney MTPK

## 2024-07-09 NOTE — TELEPHONE ENCOUNTER
Attempted call back to Philip, no answer and no option to leave voicemail.     Will not plan to attempt additional call since there is appt on Friday.     Daniel Bustamante RN, BSN  [facility]

## 2024-07-11 NOTE — PROGRESS NOTES
Medication Therapy Management (MTM) Encounter    ASSESSMENT:                            Medication Adherence/Access: No issues identified    Parkinson's Disease/Dementia:   Symptoms poorly controlled after switching to Rytary. After discussion with patient, family, and neurologist, will have patient stop Rytary and resume previous carbidopa/levodopa dosing. Cognition declined on lower dose of donepezil so okay to have patient continue on higher dose since there has been no change in loose stools even on decreased dose of donepezil. Of note, patient also recovering from bacteremia and this could also be contributing to recent decline in overall health.     PLAN:                            Please stop Rytary and resume the following schedule    7am 11am 3pm 7pm   Carbidopa/Levodopa  mg 1 1 1     Entacapone 200 mg  1 1 1     Carbidopa/Levodopa  mg CR       1   Carbidopa//Levodopa  mg CR       1     Follow-up: 7/25 @ 10am via phone call   Neurology 8/5/24    SUBJECTIVE/OBJECTIVE:                          Jennifer Venegas is a 84 year old female called for an follow up visit. She was referred to me from Neurology. Patient was accompanied by son, Philip.     Reason for visit: med review.    Allergies/ADRs: Reviewed in chart  Past Medical History: Reviewed in chart  Tobacco: She reports that she has never smoked. She has never used smokeless tobacco.  Alcohol: not currently using    Medication Adherence/Access: Patient currently in a TCU. Patient is having memory issues and will be transitioning to memory care after the TCU.     Parkinson's Disease:   Patient switched to Rytary + entacapone about 3 weeks ago. Since the switch, her tremor is getting worse. She is also unable to ambulate alone since switching to Rytary. Her face is a but more frozen and family notes she is statue like. Patient has significantly declined since making the switch to Rytary.     Patient also changed to donepezil 5mg every day to see  if her diarrhea improved. Unfortunately, her cognition significantly declined so she started taking 10mg again and her cognition has improved slightly. No change in loose stools on either 5mg or 10mg donepezil.      Patient was also recently treated for bacteremia and it was during this time she changed to Rytary and had a big decline in her overall health.     ---------------  Post Discharge Medication Reconciliation Status: medication reconcilation previously completed during another office visit.    I spent 40 minutes with this patient today. All changes were made via verbal approval with Tanvi Lindquist. A copy of the visit note was provided to the patient's provider(s).    A summary of these recommendations was sent via clinic portal.    Bonita Ledezma, Pharm.D., MPH  Medication Therapy Management Pharmacist   [facility]    Telemedicine Visit Details  Type of service:  Telephone visit  Start Time: 8:00 AM  End Time: 8:40 AM     Medication Therapy Recommendations  Parkinson's disease (H)    Current Medication: carbidopa-levodopa (RYTARY) 48. MG CPCR per CR capsule (Discontinued)   Rationale: Condition refractory to medication - Ineffective medication - Effectiveness   Recommendation: Change Medication Formulation    Status: Accepted per Provider

## 2024-07-11 NOTE — PROGRESS NOTES
"[facility] GERIATRICS    Chief Complaint   Patient presents with    RECHECK     HPI:  Jennifer Venegas is a 84 year old  (1940), who is being seen today for an episodic care visit at: [facility] (TCU) [[id number]].     Per recent TCU provider progress notes:   84 year old female PMH parkinsonism hospitalized with general weakness and significant difficulty with ambulation.  In the ER required IV labetalol due to HTN and tachycardia. 1/2 BC positive with GNR bacteremia, WBC up to 13.9 and treated with rocephin then one week of Augmentin. Source of infection not clear. Parkinson's: on rytary. A fib on apixaban. Sick euthyroid with TSH 8.11 and normal free T4 needs a recheck. To TCU for rehab.    Today's concern is: episodic follow up for pain, labs, mobility. Pain well controlled. Constipation resolved. Patient reports experiencing auditory hallucinations, hearing her daughters voice but she is not present and lives in [place]. Denies chest pain, shortness of breath, dizziness, headaches, and bowel or bladder concerns. BP range 118-166/75-95 and sats 96% on room air. SLUMS 11/30. Walking up to 200 feet with 4WW.     Allergies, and PMH/PSH reviewed in [facility] today.  REVIEW OF SYSTEMS:  4 point ROS including Respiratory, CV, GI and , other than that noted in the HPI,  is negative    Objective:   BP (!) 166/84   Pulse 86   Temp 97.5  F (36.4  C)   Resp 16   Ht 1.549 m (5' 1\")   Wt 75.3 kg (166 lb)   SpO2 96%   BMI 31.37 kg/m    GENERAL APPEARANCE:  Alert, in no distress, pleasant, cooperative, oriented x self and place. Forgetful.  EYES:  EOM, lids, pupils and irises normal, sclera clear and conjunctiva normal, no discharge or mattering on lids or lashes noted  ENT:  Mouth normal, moist mucous membranes, nose normal without drainage or crusting, external ears without lesions, hearing acuity intact  RESP:  respiratory effort normal, no chest wall tenderness, no respiratory distress, Lung sounds clear, " patient is on RA  CV:  Auscultation of heart done, rate and rhythm controlled and irregular, no murmur, no rub or gallop. Edema trace bilateral lower extremities  M/S:   Gait and station unsafe without assistance, no tenderness or swelling of the joints; able to move all extremities, digits normal  NEURO: cranial nerves 2-12 grossly intact, no facial asymmetry, no speech deficits and able to follow directions, moves all extremities symmetrically  PSYCH:  insight and judgement and memory impaired, affect and mood normal     Most Recent 3 CBC's:  Recent Labs   Lab Test 07/03/24  0640 06/28/24  0539 06/27/24  0623   WBC 9.1 9.1 9.3   HGB 11.8 12.4 12.9   MCV 93 91 91    217 193     Most Recent 3 BMP's:  Recent Labs   Lab Test 06/27/24  0623 06/26/24  1344 06/26/24  0449 06/25/24  0844     --  138  138 139  139   POTASSIUM 3.8  --  3.9  3.9 4.1  4.1   CHLORIDE 101  --  102  102 104  104   CO2 25  --  25  25 24  24   BUN 13.5  --  12.2  12.2 21.6  21.6   CR 0.51  --  0.57  0.57 0.43*  0.43*   ANIONGAP 10  --  11  11 11  11   SHANNON 9.3  --  9.1  9.1 9.4  9.4   * 109* 123*  123* 109*  109*       Assessment/Plan:  Bacteremia  Resolved. WBC 9.1 on 7/3. Completed Augmentin course. Monitor for fevers or other s/s infection.     Generalized muscle weakness  OA  Parkinson's disease without dyskinesia, with fluctuating manifestations (H)  Acute on chronic. Continue tylenol 1000 mg BID, rytary 48. mg capsules 3 capsules TID, entacapone 200 mg TID. Stopped all sinemet orders. Therapies eval and treat and follow-up progress. Neuro follow-up per home routine.      Paroxysmal atrial fibrillation (H)  Hypertension, unspecified type  Acute on chronic. Monitor BPs and HR. Continue apixaban 5 mg BID. Follow-up vs ranges next visit.      Sick euthyroidism  Noted at hospital. Repeat thyroid function tests 4-6 weeks.      Senile dementia without behavioral disturbance (H)  Chronic, continue Aricept  10 mg HS, monitor for safety.      Slow transit constipation  Continue senna s 1 tab BID PRN constipation, effective    MED REC REQUIRED  Post Medication Reconciliation Status: discharge medications reconciled, continue medications without change    Orders:  No new orders    Note by Kinjal Bermudez RN, DNP Student     I was present with the student who participated in the service and documentation of the note. I have verified the history and personally performed the physical exam and medical decision making. I agree with the assessment and plan of care as documented in the note.      Electronically signed by: NAREN Coffey CNP

## 2024-07-13 NOTE — PATIENT INSTRUCTIONS
"Recommendations from today's MTM visit:                                                    MTM (medication therapy management) is a service provided by a clinical pharmacist designed to help you get the most of out of your medicines.      Please stop Rytary and resume the following schedule    7am 11am 3pm 7pm   Carbidopa/Levodopa  mg 1 1 1     Entacapone 200 mg  1 1 1     Carbidopa/Levodopa  mg CR       1   Carbidopa//Levodopa  mg CR       1     Follow-up: 7/25 @ 10am via phone call   Neurology 8/5/24    It was great speaking with you today.  I value your experience and would be very thankful for your time in providing feedback in our clinic survey. In the next few days, you may receive an email or text message from [employer] with a link to a survey related to your  clinical pharmacist.\"     To schedule another MTM appointment, please call the clinic directly or you may call the MTM scheduling line at [phone].    My Clinical Pharmacist's contact information:                                                      Please feel free to contact me with any questions or concerns you have.      Bonita Ledezma, Pharm.D., MPH  Medication Therapy Management Pharmacist   [facility]     "

## 2024-07-15 NOTE — TELEPHONE ENCOUNTER
[facility] Call Center    Phone Message    May a detailed message be left on voicemail: no     Reason for Call: Other: [facility] is requesting a call back with further questions for the medication (Carbidopa/Levodopa).      Please advise # [phone]   Kaylene    Action Taken: Other: Neurology    Travel Screening: Not Applicable

## 2024-07-15 NOTE — TELEPHONE ENCOUNTER
Called Facility and they need further clarification about what formulations of carbidopa-levodopa patient should be on (not just the 50-200mg CR at bedtime), states they received 3 different Rx's today.    Patient has been on Rytary during the day, but per report of family this has caused too many symptoms, and so they would like patient on sinemet and sinemet CR for symptom control. Per review there are 3 noted carbidopa-levodopa's that were cancelled today.    carbidopa-levodopa (SINEMET CR)  MG CR tablet (Discontinued  Route: Take 1 tablet by mouth at bedtime - Oral    carbidopa-levodopa (SINEMET CR)  MG CR tablet (Discontinued)  Route: Take 1 tablet by mouth at bedtime - Oral    carbidopa-levodopa (SINEMET)  MG tablet (Discontinued)  Route: Take 1 tablet by mouth 3 times daily - Oral    Routing to provider to clarify daily Sinemet dosing, along with Sinemet CR daily dosing, and a nighttime coverage as previously conveyed to facility.      Jacek Davies, RN, BSN  [facility]

## 2024-07-15 NOTE — TELEPHONE ENCOUNTER
Attempted to call facility back.     Voicemail left at identified voicemail for Latanya at the facility.     Advised that Jennifer should resume Carbidopa/Levodopa  mg tablets at night. Provided call back number if additional questions.     Tanvi Lindquist MD  Movement Disorders Fellow

## 2024-07-15 NOTE — PROGRESS NOTES
[place] [facility]  INITIAL VISIT NOTE  July 16, 2024    PRIMARY CARE PROVIDER AND CLINIC:  Sean Singh [address] [facility] [address]    CHIEF COMPLAINT:  Hospital follow-up/Initial visit     HPI:    Jennifer Venegas is a 84 year old  (1940) female who was seen at [place] [facility] on July 16, 2024 for an initial visit.     Medical history is notable for dementia, Parkinson's disease, PAF, hyper tension, dyslipidemia, prediabetes, rosacea, allergic rhinitis, BCC, hearing loss, osteoarthritis, DDD of cervical spine, and obesity.    Summary of hospital course:  Patient was hospitalized at [facility] from June 25 through June 29, 2024 after presenting with septic appearance, fever, fatigue, and weakness for 4 days.  Initial laboratory evaluation was significant for white count 10.8, hemoglobin 12.8, lactic acid 2.3, N-terminal proBNP 2,617, procalcitonin 0.06, TSH 8.11, troponin I 38, creatinine 0.43, sodium 139, and potassium 4.1.  UA was unremarkable and screening for COVID-19, influenza, RSV, as well as urinary drug screen was negative.  EKG showed sinus tachycardia with premature atrial complexes.  Chest x-ray was significant for mild interstitial prominence and mild pulmonary venous congestion.  Blood culture grew Moraxella osloensis in 1 of 2 bottles.  She was resuscitated with IV fluids and treated with IV Zosyn.  ID service was consulted and antibiotic therapy transitioned to oral Augmentin 10 at discharge to continue for 7 days.  Notably, echocardiogram was significant for LVEF 55-60%, increased LV filling pressures, moderate mitral annular calcification, normal RV size and function, and no regional wall motion abnormalities.  CT abdomen/pelvis showed no acute inflammatory process.  The source of bacteremia remained evasive. TCU was recommended for rehab.     Patient is admitted to this facility for medical management, nursing care, and subacute  rehab.     Of note, history was obtained from patient, facility staff, and extensive review of the chart including hospital admission note, consult notes, and discharge summary.    Today's visit:  Patient was seen in her room, sitting in a recliner.  She appears comfortable.  She clearly has some memory impairment.  She reports no fever, chills, chest pain, palpitation, dyspnea, nausea, vomiting, abdominal pain, or urinary symptoms.  She cannot recall when her last BM was.      CODE STATUS:   DNR / DNI    PAST MEDICAL HISTORY:   Sepsis with transient Moraxella osloensis bacteremia in June 2024, unclear source  Dementia   Parkinson's disease  PAF  Hypertension  Dyslipidemia  Prediabetes  Rosacea  Allergic rhinitis  BCC of the skin  Hearing loss  Osteoarthritis  DDD of cervical spine  Obesity, BMI 31.4    Past Medical History:   Diagnosis Date    Acne rosacea     Anemia 1989    Arthritis     Basal cell carcinoma     Cataract, unspecified cataract type, unspecified laterality     Cervicalgia     Degeneration of cervical intervertebral disc 06/1999    C3-C7    Hearing loss     Hyperlipidemia     Lumbago     Osteoarthrosis     Pain in right shoulder     Parkinson's disease (H)     Resting tremor     Rhinitis, allergic     Right leg weakness     Varicose vein of leg        PAST SURGICAL HISTORY:   Past Surgical History:   Procedure Laterality Date    APPENDECTOMY  1964    COLONOSCOPY  10/31/2006    EYE SURGERY  10/05/2011    blepharoplasty    GYN SURGERY  1974    Ligate fallopian tube    HERNIA REPAIR  1964    Incisional hernia, reducible    ORTHOPEDIC SURGERY Right 2005    arthroscopy of joint shoulder    ORTHOPEDIC SURGERY Left 04/10/2017    knee arthroplasty    REVERSE ARTHROPLASTY SHOULDER Right 10/25/2018    Procedure: RIGHT REVERSE TOTAL SHOULDER ARTHROPLASTY;  Surgeon: Edd Currie MD;  Location: [facility] OR    SIGMOIDOSCOPY FLEXIBLE  10/2000       FAMILY HISTORY:   Family History   Problem Relation Age of Onset     Neurologic Disorder Mother     Parkinsonism Mother     Dementia Mother     Prostate Cancer Father     Hypertension Father     Prostate Cancer Brother     Heart Disease Brother        SOCIAL HISTORY:  Social History     Tobacco Use    Smoking status: Never    Smokeless tobacco: Never   Substance Use Topics    Alcohol use: Yes     Comment: occasional       MEDICATIONS:  Current Outpatient Medications   Medication Sig Dispense Refill    acetaminophen (TYLENOL) 500 MG tablet Take 2 tablets (1,000 mg) by mouth 2 times daily. May also take 2 tablets (1,000 mg) daily as needed for mild pain or fever. 264 tablet 11    apixaban ANTICOAGULANT (ELIQUIS) 5 MG tablet Take 1 tablet (5 mg) by mouth 2 times daily      calcium polycarbophil (FIBERCON) 625 MG tablet Take 2 tablets (1,250 mg) by mouth daily      carbidopa-levodopa (SINEMET CR)  MG CR tablet Take 1 tablet by mouth at bedtime 90 tablet 3    carbidopa-levodopa (SINEMET CR)  MG CR tablet Take 1 tablet by mouth at bedtime 90 tablet 3    carbidopa-levodopa (SINEMET)  MG tablet Take 1 tablet by mouth 3 times daily 270 tablet 3    donepezil (ARICEPT) 10 MG tablet Take 1 tablet (10 mg) by mouth at bedtime 30 tablet 3    entacapone (COMTAN) 200 MG tablet Take 1 tablet (200 mg) by mouth 3 times daily 270 tablet 3    hydrocortisone 2.5 % cream APPLY RECTALLY TWICE DAILY UNTIL RESOLVED;& MAY APPLY RECTALLY TWICE DAILY AS NEEDED FOR HEMORRHOIDS, OK TO KEEP AT BEDSIDE. 28.35 g 97    hydrocortisone 2.5 % cream Apply topically daily as needed for other (hemorrhoids)      metroNIDAZOLE (METROGEL) 1 % external gel Apply topically daily To forehead and face until resolved.  Avoid contact with eyes. Ok to keep in room 60 g 4    nystatin (MYCOSTATIN) 040707 UNIT/GM external powder Apply topically 2 times daily Apply under breast twice daily for rash flare until healed. Use as directed with any rash flare. Ok to keep bedside. 30 g 11    SENNA-docusate sodium (SENNA S)  "8.6-50 MG tablet Take 1 tablet by mouth 2 times daily as needed (constipation)         MED REC REQUIRED  Post Medication Reconciliation Status: Medications were reconciled and corrected.    ALLERGIES:  Allergies   Allergen Reactions    Other [Seasonal Allergies]      ragweed       ROS:  10 point ROS was attempted but was limited, given patient's underlying cognitive impairment. It was reviewed as much as possible as outlined in HPI.      PHYSICAL EXAM:  Vital signs were reviewed in the chart.  Vital Signs: BP (!) 140/76   Pulse 80   Temp 97.8  F (36.6  C)   Resp 16   Ht 1.549 m (5' 1\")   Wt 75.3 kg (166 lb)   SpO2 98%   BMI 31.37 kg/m     General: Comfortable and in no acute distress  HEENT: No conjunctival pallor, no scleral icterus or injection, moist oral mucosa  Cardiovascular: Normal S1, S2, regular rhythm with extra beats  Respiratory: Lungs clear to auscultation bilaterally  GI: Abdomen soft, non-tender, non-distended, +BS  Extremities: No LE edema  Neuro: CX II-XII grossly intact; ROM in all four extremities grossly intact; right hand tremor noted  Psych: Alert and oriented x 2-3; normal affect  Skin: No acute rash    LABORATORY/IMAGING DATA:  All relevant labs and imaging data in [facility] and/or Care Everywhere were personally reviewed today.    Most Recent 3 CBC's:  Recent Labs   Lab Test 07/03/24  0640 06/28/24  0539 06/27/24  0623   WBC 9.1 9.1 9.3   HGB 11.8 12.4 12.9   MCV 93 91 91    217 193     Most Recent 3 BMP's:  Recent Labs   Lab Test 06/27/24  0623 06/26/24  1344 06/26/24  0449 06/25/24  0844     --  138  138 139  139   POTASSIUM 3.8  --  3.9  3.9 4.1  4.1   CHLORIDE 101  --  102  102 104  104   CO2 25  --  25 25 24 24   BUN 13.5  --  12.2  12.2 21.6  21.6   CR 0.51  --  0.57  0.57 0.43*  0.43*   ANIONGAP 10  --  11  11 11  11   SHANNON 9.3  --  9.1  9.1 9.4  9.4   * 109* 123*  123* 109*  109*     Most Recent 2 LFT's:  Recent Labs   Lab Test 06/26/24  0449 " 06/25/24  0844   AST 13 12   ALT 10 <5   ALKPHOS 71 63   BILITOTAL 0.8 0.3         ASSESSMENT/PLAN:  Sepsis,  Transient Moraxella osloensis bacteremia (unclear source).  Completed 7-day course of Augmentin following discharge from hospital.  Patient is afebrile and hemodynamic stable.  Plan:  Monitor vital signs and fever curve    Dementia.  SLUMS score 11/30 this TCU stay.  Plan:  Continue donepezil 10 mg at bedtime  Staff to assist with daily care and mobility  Continue to monitor cognition for safe discharge planning    Parkinson's disease.  All orders for carbidopa/levodopa were discontinued in TCU except for Rytary.  Plan:  Continue entacapone 200 mg 3 times daily  Continue Rytary ER 48. mg, 3 capsules 3 times daily  Follow-up with neurology as directed    PAF.  EKG in the hospital showed sinus rhythm with PACs.    Patient is not on rate control or antiarrhythmic medications.  Heart rate is controlled  Plan:  Continue apixaban 5 mg twice daily  Monitor heart rate    Hypertension.  History of elevated blood pressure but not on medical therapy.  SBP in the range of 130-160 in the TCU.  Plan:  No indication for medical therapy at this juncture due to advanced age and underlying dementia  Monitor blood pressure per TCU routine    Dyslipidemia.  Not on medical therapy.  Plan:  No indication for therapy due to advanced age and dementia    Prediabetes.  Hgb A1c 5.9% in March 2023.  Plan:  Monitor blood glucose PRN  Follow-up as outpatient    Abnormal thyroid function.  TSH 8.11 and free T4 1.01 On June 25, 2024 suggestive of subclinical hypothyroidism.  Plan:  Recheck TSH and free T4 in early August 2024    Generalized muscle weakness,  Physical deconditioning,  FTT.  Plan:  Continue nutritional supplement  Staff to assist with daily care and mobility  Continue PT/OT evaluation and therapy        Orders written by provider at facility:  None      Recommendation by provider at facility:  Recheck TSH and free T4  in early August 2024 for abnormal thyroid function        Disclaimer: This note contains text created using speech-recognition software and may have unintended word substitutions.    Electronically signed by:  Karen Albert MD

## 2024-07-15 NOTE — TELEPHONE ENCOUNTER
Health Call Center    Phone Message    May a detailed message be left on voicemail: yes     Reason for Call: Medication Question or concern regarding medication   Prescription Clarification  Name of Medication: carbidopa-levodopa (SINEMET CR)  MG CR tablet   Prescribing Provider: Lexa,    Pharmacy: N/A   What on the order needs clarification?     [facility] facility is requesting a call back to verify if the pt should be starting the medication listed above.     Please call back to advise at # [phone].      Action Taken: Message routed to:  Other: [facility]    Travel Screening: Not Applicable     Date of Service:

## 2024-07-16 NOTE — CONFIDENTIAL NOTE
Re-ordered carbidopa/levodopa dosing per verbal with Dr. Lindquist and discontinued Rytary.     Bonita Ledezma, Pharm.D., MPH  Medication Therapy Management Pharmacist   [facility]

## 2024-07-16 NOTE — TELEPHONE ENCOUNTER
Called and relayed information to patient's nurse about carbidopa-levodopa formulations the patient should be taking (Rytary discontinued).    carbidopa-levodopa (SINEMET CR)  MG CR tablet (Discontinued  Route: Take 1 tablet by mouth at bedtime - Oral     carbidopa-levodopa (SINEMET CR)  MG CR tablet (Discontinued)  Route: Take 1 tablet by mouth at bedtime - Oral     carbidopa-levodopa (SINEMET)  MG tablet (Discontinued)  Route: Take 1 tablet by mouth 3 times daily - Oral      They state they have seen the updated Rx's and appreciate the confirmation.    Jacek Davies, RN, BSN  [facility]

## 2024-07-17 NOTE — PROGRESS NOTES
[facility] GERIATRICS DISCHARGE SUMMARY  PATIENT'S NAME: Jennifer Venegas  YOB: 1940  MEDICAL RECORD NUMBER:  [id number]  Place of Service where encounter took place:  [facility] (TCU) [[id number]]    PRIMARY CARE PROVIDER AND CLINIC RESPONSIBLE AFTER TRANSFER:   NAREN Rothman CNP, [address] [facility] [address]    [facility] Provider     Transferring providers: NAREN Coffey CNP, Dr. Roosevelt MD  Recent Hospitalization/ED:  [facility] Hospital stay 6/25/24 to 6/29/24.  Date of SNF Admission:  6/29/24  Date of SNF (anticipated) Discharge:  7/23/24  Discharged to: previous assisted living  Cognitive Scores: SLUMS: 11/30  Physical Function: Ambulating 200 ft with 4WW  DME: No new DME needed    CODE STATUS/ADVANCE DIRECTIVES DISCUSSION:  No CPR- Do NOT Intubate   ALLERGIES: Other [seasonal allergies]    NURSING FACILITY COURSE   Medication Changes/Rationale:   Stopped Rotary and started Sinemet CR per neurology    Per recent TCU provider progress notes:   84 year old female PMH parkinsonism hospitalized with general weakness and significant difficulty with ambulation.  In the ER required IV labetalol due to HTN and tachycardia. 1/2 BC positive with GNR bacteremia, WBC up to 13.9 and treated with rocephin then one week of Augmentin. Source of infection not clear. Parkinson's: on rytary. A fib on apixaban. Sick euthyroid with TSH 8.11 and normal free T4 needs a recheck. To TCU for rehab.    Seen for discharge visit. No new concerns. Denies headaches, dizziness, chest pain, dyspnea, bowel or bladder issues. Wt down 3 lbs since admission. BP range /61-85 and sats 94% room air. Back to memory care with therapies as ordered.     Summary of nursing facility stay:   Bacteremia  Resolved. WBC 9.1 on 7/3. Completed Augmentin course. Monitor for fevers or other s/s infection.     Generalized muscle weakness  OA  Parkinson's disease without dyskinesia,  with fluctuating manifestations (H)  Acute on chronic. Continue tylenol 1000 mg BID, entacapone 200 mg TID. Stopped rytary and resumed sinemet orders per neurology. Neuro follow-up per home routine. Home with therapies.      Paroxysmal atrial fibrillation (H)  Hypertension, unspecified type  Acute on chronic. Monitor BPs and HR. Continue apixaban 5 mg BID. Follow-up vs ranges next visit.      Sick euthyroidism  Noted at hospital. Repeat thyroid function tests 4-6 weeks.      Senile dementia without behavioral disturbance (H)  Chronic, continue Aricept 10 mg HS, monitor for safety.      Slow transit constipation  Continue senna s 1 tab BID PRN constipation, effective    Discharge Medications:  MED REC REQUIRED  Post Medication Reconciliation Status: discharge medications reconciled, continue medications without change    Current Outpatient Medications   Medication Sig Dispense Refill    acetaminophen (TYLENOL) 500 MG tablet Take 2 tablets (1,000 mg) by mouth 2 times daily. May also take 2 tablets (1,000 mg) daily as needed for mild pain or fever. 264 tablet 11    apixaban ANTICOAGULANT (ELIQUIS) 5 MG tablet Take 1 tablet (5 mg) by mouth 2 times daily      calcium polycarbophil (FIBERCON) 625 MG tablet Take 2 tablets by mouth daily      carbidopa-levodopa (SINEMET CR)  MG CR tablet Take 1 tablet by mouth at bedtime 90 tablet 3    carbidopa-levodopa (SINEMET CR)  MG CR tablet Take 1 tablet by mouth at bedtime 90 tablet 3    carbidopa-levodopa (SINEMET)  MG tablet Take 1 tablet by mouth 3 times daily 270 tablet 3    donepezil (ARICEPT) 10 MG tablet Take 1 tablet (10 mg) by mouth at bedtime 30 tablet 3    entacapone (COMTAN) 200 MG tablet Take 1 tablet (200 mg) by mouth 3 times daily 270 tablet 3    hydrocortisone 2.5 % cream APPLY RECTALLY TWICE DAILY UNTIL RESOLVED;& MAY APPLY RECTALLY TWICE DAILY AS NEEDED FOR HEMORRHOIDS, OK TO KEEP AT BEDSIDE. 28.35 g 97    hydrocortisone 2.5 % cream Apply  "topically daily as needed for other (hemorrhoids)      metroNIDAZOLE (METROGEL) 1 % external gel Apply topically daily To forehead and face until resolved.  Avoid contact with eyes. Ok to keep in room 60 g 4    nystatin (MYCOSTATIN) 314788 UNIT/GM external powder Apply topically 2 times daily Apply under breast twice daily for rash flare until healed. Use as directed with any rash flare. Ok to keep bedside. 30 g 11    SENNA-docusate sodium (SENNA S) 8.6-50 MG tablet Take 1 tablet by mouth 2 times daily as needed (constipation)          Controlled medications:   not applicable/none     Past Medical History:   Past Medical History:   Diagnosis Date    Acne rosacea     Anemia 1989    Arthritis     Basal cell carcinoma     Cataract, unspecified cataract type, unspecified laterality     Cervicalgia     Degeneration of cervical intervertebral disc 06/1999    C3-C7    Hearing loss     Hyperlipidemia     Lumbago     Osteoarthrosis     Pain in right shoulder     Parkinson's disease (H)     Resting tremor     Rhinitis, allergic     Right leg weakness     Varicose vein of leg      Physical Exam:   Vitals: BP 99/61   Pulse 65   Temp 97.3  F (36.3  C)   Resp 18   Ht 1.549 m (5' 1\")   Wt 75.2 kg (165 lb 12.8 oz)   SpO2 96%   BMI 31.33 kg/m    BMI: Body mass index is 31.33 kg/m .  GENERAL APPEARANCE:  Alert, in no distress, pleasant, cooperative, oriented x self and place. Forgetful.  EYES:  EOM, lids, pupils and irises normal, sclera clear and conjunctiva normal, no discharge or mattering on lids or lashes noted  ENT:  Mouth normal, moist mucous membranes, nose normal without drainage or crusting, external ears without lesions, hearing acuity intact  RESP:  respiratory effort normal, no chest wall tenderness, no respiratory distress, Lung sounds clear, patient is on RA  CV:  Auscultation of heart done, rate and rhythm controlled and irregular, no murmur, no rub or gallop. Edema trace bilateral lower extremities  M/S:   " Gait and station unsafe without assistance, no tenderness or swelling of the joints; able to move all extremities, digits normal  NEURO: cranial nerves 2-12 grossly intact, no facial asymmetry, no speech deficits and able to follow directions, moves all extremities symmetrically  PSYCH:  insight and judgement and memory impaired, affect and mood normal     SNF labs: Most Recent 3 CBC's:  Recent Labs   Lab Test 07/03/24  0640 06/28/24  0539 06/27/24  0623   WBC 9.1 9.1 9.3   HGB 11.8 12.4 12.9   MCV 93 91 91    217 193     Most Recent 3 BMP's:  Recent Labs   Lab Test 06/27/24  0623 06/26/24  1344 06/26/24  0449 06/25/24  0844     --  138  138 139  139   POTASSIUM 3.8  --  3.9  3.9 4.1  4.1   CHLORIDE 101  --  102  102 104  104   CO2 25  --  25 25 24  24   BUN 13.5  --  12.2  12.2 21.6  21.6   CR 0.51  --  0.57  0.57 0.43*  0.43*   ANIONGAP 10  --  11  11 11  11   SHANNON 9.3  --  9.1  9.1 9.4  9.4   * 109* 123*  123* 109*  109*     Most Recent TSH and T4:  Recent Labs   Lab Test 06/25/24  1822 06/25/24  1507   TSH  --  8.11*   T4 1.01 0.97       DISCHARGE PLAN:  Follow up labs: recheck thyroid function in 2-4 weeks  Medical Follow Up:      Follow up with primary care provider in 3-4 weeks  Follow up with specialist neurology as recommended   Current [place] scheduled appointments:  Appointments in Next Year      Jul 25, 2024 10:00 AM  Pharmacist Visit with Bonita Ledezma RPH  [facility] [place] ([facility] ) [phone]     Aug 05, 2024 3:30 PM  (Arrive by 3:15 PM)  Return Movement Disorder with Tanvi Lindquist MD  [facility] ([facility] ) [phone]           Discharge Services: Home Care:  Occupational Therapy, Physical Therapy, and From:  [facility]  Discharge Instructions Verbalized to Patient at Discharge:   None    TOTAL  DISCHARGE TIME:   Greater than 30 minutes  Electronically signed by:  NAREN Coffey CNP     Home care Face to Face documentation done in [facility] attached to Home care orders for [facility].

## 2024-07-25 NOTE — Clinical Note
7/25/2024       RE: Jennifer Venegas  [address]o [address]  [address] [place] [address]     Dear Colleague,    Thank you for referring your patient, Jennifer Venegas, to the [facility] [place] at [facility]. Please see a copy of my visit note below.    Medication Therapy Management (MTM) Encounter    ASSESSMENT:                            Medication Adherence/Access: No issues identified    Parkinson's Disease/Dementia:   Improved since stopping Rytary and switching back to previous carbidopa/levodopa regimen and higher dose of donepezil. Family would prefer to not make any medication changes at this time and are questioning if patient needs to continue seeing a neurologist. All questions answered.     PLAN:                            Philip and I discussed keeping medications where they are. I recommend discussing with Dr. Lindquist at your upcoming visit if continued care with a neurologist is needed.    Follow-up: as needed for any medications questions or concerns     SUBJECTIVE/OBJECTIVE:                          Jennifer Venegas is a 84 year old female called for an follow up visit. Patient gave consent for Philip, her son, to speak on her behalf during the visit.    Reason for visit: Follow Up    Allergies/ADRs: Reviewed in chart  Past Medical History: Reviewed in chart  Tobacco: She reports that she has never smoked. She has never used smokeless tobacco.  Alcohol: not currently using    Medication Adherence/Access: Now residing in memory care.     Parkinson's Disease:   - Donepezil 10mg every day   - Carbidopa/levodopa 25/100mg CR 1 tablet at bedtime   - Carbidopa/levodopa 50/200mg CR 1 tablet at bedtime   - Carbidopa/levodopa 25/250mg IR 1 tablet three times daily   - Entacapone 200mg three times daily     Patient is now more lucid per her son and things are going well since stopping Rytary and getting back on her  previous carbidopa/levodopa regimen. She is getting back to having moments where she is able to walk a bit better.     Overall, has declined mentally and physically. She was discharged from TCU a few days ago and is now in memory care. She is struggling with turning around (90 degrees) and moving her feet once she is standing is challenging. She is only able to use her walker for a few feet before she is too tired and wants to use the wheelchair. Son is trying to cope with this as her new baseline. They do not want to make any further   ---------------    I spent 26 minutes with this patient today. A copy of the visit note was provided to the patient's provider(s).    A summary of these recommendations was sent via clinic portal.    Bonita Ledezma, Pharm.D., MPH  Medication Therapy Management Pharmacist   [facility]    Telemedicine Visit Details  Type of service:  Telephone visit  Start Time: 10:04 AM  End Time: 10:30 AM     Medication Therapy Recommendations  No medication therapy recommendations to display           Again, thank you for allowing me to participate in the care of your patient.      Sincerely,    Bonita Ledezma [facility]

## 2024-07-25 NOTE — PROGRESS NOTES
Medication Therapy Management (MTM) Encounter    ASSESSMENT:                            Medication Adherence/Access: No issues identified    Parkinson's Disease/Dementia:   Improved since stopping Rytary and switching back to previous carbidopa/levodopa regimen and higher dose of donepezil. Family would prefer to not make any medication changes at this time and are questioning if patient needs to continue seeing a neurologist. All questions answered.     PLAN:                            Philip and I discussed keeping medications where they are. I recommend discussing with Dr. Lindquist at your upcoming visit if continued care with a neurologist is needed.    Follow-up: as needed for any medications questions or concerns     SUBJECTIVE/OBJECTIVE:                          Jennifer Venegas is a 84 year old female called for an follow up visit. Patient gave consent for Philip, her son, to speak on her behalf during the visit.    Reason for visit: Follow Up    Allergies/ADRs: Reviewed in chart  Past Medical History: Reviewed in chart  Tobacco: She reports that she has never smoked. She has never used smokeless tobacco.  Alcohol: not currently using    Medication Adherence/Access: Now residing in memory care.     Parkinson's Disease:   - Donepezil 10mg every day   - Carbidopa/levodopa 25/100mg CR 1 tablet at bedtime   - Carbidopa/levodopa 50/200mg CR 1 tablet at bedtime   - Carbidopa/levodopa 25/250mg IR 1 tablet three times daily   - Entacapone 200mg three times daily     Patient is now more lucid per her son and things are going well since stopping Rytary and getting back on her previous carbidopa/levodopa regimen. She is getting back to having moments where she is able to walk a bit better.     Overall, has declined mentally and physically. She was discharged from TCU a few days ago and is now in memory care. She is struggling with turning around (90 degrees) and moving her feet once she is standing is challenging. She is only  able to use her walker for a few feet before she is too tired and wants to use the wheelchair. Son is trying to cope with this as her new baseline. They do not want to make any further   ---------------    I spent 26 minutes with this patient today. A copy of the visit note was provided to the patient's provider(s).    A summary of these recommendations was sent via clinic portal.    Bonita Ledezma, Pharm.D., MPH  Medication Therapy Management Pharmacist   [facility]    Telemedicine Visit Details  Type of service:  Telephone visit  Start Time: 10:04 AM  End Time: 10:30 AM     Medication Therapy Recommendations  No medication therapy recommendations to display

## 2024-07-25 NOTE — Clinical Note
Good morning - Just an update, Jennifer is doing better with current carbidopa/levodopa regimen but overall family thinks this is her new baseline. They would prefer to not make any medication changes at this time and follow up with MTM as needed. They are also questioning if she needs to continue seeing a neurologist and I recommended they discuss this with Dr. Lindquist at upcoming visit beginning of August.  Thanks! Bonita Ledezma, Pharm.D., MPH Medication Therapy Management Pharmacist  [facility]

## 2024-07-25 NOTE — PATIENT INSTRUCTIONS
"Recommendations from today's MTM visit:                                                       Philip and I discussed keeping medications where they are. I recommend discussing with Dr. Lindquist at your upcoming visit if continued care with a neurologist is needed.    Follow-up: as needed for any medications questions or concerns     It was great speaking with you today.  I value your experience and would be very thankful for your time in providing feedback in our clinic survey. In the next few days, you may receive an email or text message from [facility] with a link to a survey related to your  clinical pharmacist.\"     To schedule another MTM appointment, please call the clinic directly or you may call the MTM scheduling line at [phone].    My Clinical Pharmacist's contact information:                                                      Please feel free to contact me with any questions or concerns you have.      Bonita Ledezma, Pharm.D., MPH  Medication Therapy Management Pharmacist   [facility]   "

## 2024-07-31 NOTE — LETTER
Jennifer Venegas orders:     -BMP and CBC next week on lab day for HTN    -TSH and free T4 for hypothyroidism           Electronically signed by:  NAREN Rothman CNP

## 2024-07-31 NOTE — LETTER
" 7/31/2024      Jennifer Venegas  [address]  [address] [place] [address]        [place] [facility]  PRIMARY CARE PROVIDER AND CLINIC:  NAREN Rothman [facility], [address] [facility] [address]  Chief Complaint   Patient presents with     Hospital F/U     [place] Medical Record Number:  [id number]  Place of Service where encounter took place:  [facility] [[id number]]    Jennifer Venegas  is a 84 year old  (1940),  returned to the above facility from [facility] where she stayed from 6/29/24 - 7/23/24 following a hospital stay at [facility] from 6/25/24 - 6/29/24 .  Admitted to this facility for  rehab, medical management, and nursing care.    HPI:    HPI information obtained from: facility chart records, facility staff, and [facility] chart review.   Brief Summary of Hospital Course:   84 year old female PMH parkinsonism hospitalized with general weakness and significant difficulty with ambulation. In the ER required IV labetalol due to HTN and tachycardia. 1/2 BC positive with GNR bacteremia, WBC up to 13.9 and treated with rocephin then one week of Augmentin. Source of infection not clear.   Updates on Status Since Skilled nursing Admission:   Moved from AL to memory care. Calm and pleasant today. Denies any acute concerns. Some notes indicated SOB but she says \"Im fine\". Encouraged to sleep with some HOB elevation versus flat. Not ambulating per staff but use of wc and staff assist for moving about MC unit. Currently working with PT/OT for strength, mobility. Parkinson medication per neurology.     CODE STATUS/ADVANCE DIRECTIVES DISCUSSION:   DNR / DNI  Patient's living condition: lives in an assisted living facility  ALLERGIES: Other [seasonal allergies]  PAST MEDICAL HISTORY:  has a past medical history of Acne rosacea, Anemia (1989), Arthritis, Basal cell carcinoma, Cataract, unspecified " cataract type, unspecified laterality, Cervicalgia, Degeneration of cervical intervertebral disc (06/1999), Hearing loss, Hyperlipidemia, Lumbago, Osteoarthrosis, Pain in right shoulder, Parkinson's disease (H), Resting tremor, Rhinitis, allergic, Right leg weakness, and Varicose vein of leg.  PAST SURGICAL HISTORY:   has a past surgical history that includes appendectomy (1964); colonoscopy (10/31/2006); orthopedic surgery (Right, 2005); orthopedic surgery (Left, 04/10/2017); GYN surgery (1974); hernia repair (1964); Sigmoidoscopy flexible (10/2000); Eye surgery (10/05/2011); and Reverse arthroplasty shoulder (Right, 10/25/2018).  FAMILY HISTORY: family history includes Dementia in her mother; Heart Disease in her brother; Hypertension in her father; Neurologic Disorder in her mother; Parkinsonism in her mother; Prostate Cancer in her brother and father.  SOCIAL HISTORY:   reports that she has never smoked. She has never used smokeless tobacco. She reports current alcohol use. She reports that she does not use drugs.    Post Discharge Medication Reconciliation Status: discharge medications reconciled, continue medications without change    Current Outpatient Medications   Medication Sig Dispense Refill     acetaminophen (TYLENOL) 500 MG tablet Take 2 tablets (1,000 mg) by mouth 2 times daily. May also take 2 tablets (1,000 mg) daily as needed for mild pain or fever. 264 tablet 11     apixaban ANTICOAGULANT (ELIQUIS) 5 MG tablet Take 1 tablet (5 mg) by mouth 2 times daily       calcium polycarbophil (FIBERCON) 625 MG tablet Take 2 tablets by mouth daily       carbidopa-levodopa (SINEMET CR)  MG CR tablet Take 1 tablet by mouth at bedtime 90 tablet 3     carbidopa-levodopa (SINEMET CR)  MG CR tablet Take 1 tablet by mouth at bedtime 90 tablet 3     carbidopa-levodopa (SINEMET)  MG tablet Take 1 tablet by mouth 3 times daily 270 tablet 3     donepezil (ARICEPT) 10 MG tablet Take 1 tablet (10 mg) by  "mouth at bedtime 30 tablet 3     entacapone (COMTAN) 200 MG tablet Take 1 tablet (200 mg) by mouth 3 times daily 270 tablet 3     hydrocortisone 2.5 % cream Apply topically every 6 hours as needed for other (hemorrhoids)       metroNIDAZOLE (METROGEL) 1 % external gel Apply topically daily To forehead and face until resolved.  Avoid contact with eyes. Ok to keep in room 60 g 4     nystatin (MYCOSTATIN) 442635 UNIT/GM external powder Apply topically 2 times daily Apply under breast twice daily for rash flare until healed. Use as directed with any rash flare. Ok to keep bedside. 30 g 11     SENNA-docusate sodium (SENNA S) 8.6-50 MG tablet Take 1 tablet by mouth at bedtime And 1 tab twice daily as needed       hydrocortisone 2.5 % cream APPLY RECTALLY TWICE DAILY UNTIL RESOLVED;& MAY APPLY RECTALLY TWICE DAILY AS NEEDED FOR HEMORRHOIDS, OK TO KEEP AT BEDSIDE. 28.35 g 97     ROS:  Limited secondary to cognitive impairment but today pt reports ok    Vitals:  /70   Pulse 66   Resp 16   Ht 1.651 m (5' 5\")   Wt 78.7 kg (173 lb 9.6 oz)   SpO2 97%   BMI 28.89 kg/m    Exam:  GENERAL APPEARANCE:  Alert, in no distress, less confused   ENT:  Mouth and posterior oropharynx normal, moist mucous membranes  EYES:  EOM, conjunctivae, lids, pupils and irises normal  RESP:  lungs clear to auscultation , diminished breath sounds throughout, fine crackles posterior   CV:  Palpation and auscultation of heart done , regular rate and irregular rhythm, no murmur, rub, or gallop, trace edema R>L 2+ pitting to ankles now 1+ to legs, ankles and feet-compression off  ABDOMEN:  bowel sounds hypoactive   M/S:  mostly WC   SKIN: limited but intact to visualized areas  NEURO:   Cranial nerves 2-12 are normal tested and grossly at patient's baseline  PSYCH:  insight and judgement impaired, memory impaired    Lab/Diagnostic data:  CBC RESULTS:   Recent Labs   Lab Test 07/03/24  0640 06/28/24  0539   WBC 9.1 9.1   RBC 4.01 4.25   HGB 11.8 " "12.4   HCT 37.1 38.8   MCV 93 91   MCH 29.4 29.2   MCHC 31.8 32.0   RDW 13.9 13.6    217       Last Basic Metabolic Panel:  Recent Labs   Lab Test 06/27/24  0623 06/26/24  1344 06/26/24  0449     --  138  138   POTASSIUM 3.8  --  3.9  3.9   CHLORIDE 101  --  102  102   SHANNON 9.3  --  9.1  9.1   CO2 25  --  25  25   BUN 13.5  --  12.2  12.2   CR 0.51  --  0.57  0.57   * 109* 123*  123*       Liver Function Studies -   Recent Labs   Lab Test 06/26/24  0449 06/25/24  0844   PROTTOTAL 6.5 6.4   ALBUMIN 3.9 4.0   BILITOTAL 0.8 0.3   ALKPHOS 71 63   AST 13 12   ALT 10 <5       TSH   Date Value Ref Range Status   06/25/2024 8.11 (H) 0.30 - 4.20 uIU/mL Final   05/02/2024 2.09 0.30 - 4.20 uIU/mL Final       No results found for: \"A1C\"    ASSESSMENT/PLAN:  (A41.9) Sepsis, due to unspecified organism, unspecified whether acute organ dysfunction present (H)  (primary encounter diagnosis)  Comment: resolved   Plan:   -monitor for s/s of infection     (F03.90) Dementia, unspecified dementia severity, unspecified dementia type, unspecified whether behavioral, psychotic, or mood disturbance or anxiety (H)  Comment: ongoing   Plan:   -moved into memory care   -aricept back to 10 mg po daily     (G20.A1) Parkinson's disease, unspecified whether dyskinesia present, unspecified whether manifestations fluctuate (H)  Comment: chronic   Plan:   -follows with neurology next appt 8/5  -sinemet and comtan per neurology     (I48.0) Paroxysmal atrial fibrillation (H)  (I10) Essential hypertension  Comment: stable   Plan:   -eliquis and rate controlled without BB    (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

## 2024-07-31 NOTE — PROGRESS NOTES
"[place] [facility]  PRIMARY CARE PROVIDER AND CLINIC:  Sean Singh, APRN CNP, [address] [address]  Chief Complaint   Patient presents with    Hospital F/U     [place] Medical Record Number:  [id number]  Place of Service where encounter took place:  [facility] [[id number]]    Jennifer Venegas  is a 84 year old  (1940),  returned to the above facility from [facility] where she stayed from 6/29/24 - 7/23/24 following a hospital stay at [facility] from 6/25/24 - 6/29/24 .  Admitted to this facility for  rehab, medical management, and nursing care.    HPI:    HPI information obtained from: facility chart records, facility staff, and [facility] chart review.   Brief Summary of Hospital Course:   84 year old female PMH parkinsonism hospitalized with general weakness and significant difficulty with ambulation. In the ER required IV labetalol due to HTN and tachycardia. 1/2 BC positive with GNR bacteremia, WBC up to 13.9 and treated with rocephin then one week of Augmentin. Source of infection not clear.   Updates on Status Since Skilled nursing Admission:   Moved from AL to memory care. Calm and pleasant today. Denies any acute concerns. Some notes indicated SOB but she says \"Im fine\". Encouraged to sleep with some HOB elevation versus flat. Not ambulating per staff but use of wc and staff assist for moving about MC unit. Currently working with PT/OT for strength, mobility. Parkinson medication per neurology.     CODE STATUS/ADVANCE DIRECTIVES DISCUSSION:   DNR / DNI  Patient's living condition: lives in an assisted living facility  ALLERGIES: Other [seasonal allergies]  PAST MEDICAL HISTORY:  has a past medical history of Acne rosacea, Anemia (1989), Arthritis, Basal cell carcinoma, Cataract, unspecified cataract type, unspecified laterality, Cervicalgia, Degeneration of cervical intervertebral disc (06/1999), Hearing " loss, Hyperlipidemia, Lumbago, Osteoarthrosis, Pain in right shoulder, Parkinson's disease (H), Resting tremor, Rhinitis, allergic, Right leg weakness, and Varicose vein of leg.  PAST SURGICAL HISTORY:   has a past surgical history that includes appendectomy (1964); colonoscopy (10/31/2006); orthopedic surgery (Right, 2005); orthopedic surgery (Left, 04/10/2017); GYN surgery (1974); hernia repair (1964); Sigmoidoscopy flexible (10/2000); Eye surgery (10/05/2011); and Reverse arthroplasty shoulder (Right, 10/25/2018).  FAMILY HISTORY: family history includes Dementia in her mother; Heart Disease in her brother; Hypertension in her father; Neurologic Disorder in her mother; Parkinsonism in her mother; Prostate Cancer in her brother and father.  SOCIAL HISTORY:   reports that she has never smoked. She has never used smokeless tobacco. She reports current alcohol use. She reports that she does not use drugs.    Post Discharge Medication Reconciliation Status: discharge medications reconciled, continue medications without change    Current Outpatient Medications   Medication Sig Dispense Refill    acetaminophen (TYLENOL) 500 MG tablet Take 2 tablets (1,000 mg) by mouth 2 times daily. May also take 2 tablets (1,000 mg) daily as needed for mild pain or fever. 264 tablet 11    apixaban ANTICOAGULANT (ELIQUIS) 5 MG tablet Take 1 tablet (5 mg) by mouth 2 times daily      calcium polycarbophil (FIBERCON) 625 MG tablet Take 2 tablets by mouth daily      carbidopa-levodopa (SINEMET CR)  MG CR tablet Take 1 tablet by mouth at bedtime 90 tablet 3    carbidopa-levodopa (SINEMET CR)  MG CR tablet Take 1 tablet by mouth at bedtime 90 tablet 3    carbidopa-levodopa (SINEMET)  MG tablet Take 1 tablet by mouth 3 times daily 270 tablet 3    donepezil (ARICEPT) 10 MG tablet Take 1 tablet (10 mg) by mouth at bedtime 30 tablet 3    entacapone (COMTAN) 200 MG tablet Take 1 tablet (200 mg) by mouth 3 times daily 270 tablet 3  "   hydrocortisone 2.5 % cream Apply topically every 6 hours as needed for other (hemorrhoids)      metroNIDAZOLE (METROGEL) 1 % external gel Apply topically daily To forehead and face until resolved.  Avoid contact with eyes. Ok to keep in room 60 g 4    nystatin (MYCOSTATIN) 805913 UNIT/GM external powder Apply topically 2 times daily Apply under breast twice daily for rash flare until healed. Use as directed with any rash flare. Ok to keep bedside. 30 g 11    SENNA-docusate sodium (SENNA S) 8.6-50 MG tablet Take 1 tablet by mouth at bedtime And 1 tab twice daily as needed      hydrocortisone 2.5 % cream APPLY RECTALLY TWICE DAILY UNTIL RESOLVED;& MAY APPLY RECTALLY TWICE DAILY AS NEEDED FOR HEMORRHOIDS, OK TO KEEP AT BEDSIDE. 28.35 g 97     ROS:  Limited secondary to cognitive impairment but today pt reports ok    Vitals:  /70   Pulse 66   Resp 16   Ht 1.651 m (5' 5\")   Wt 78.7 kg (173 lb 9.6 oz)   SpO2 97%   BMI 28.89 kg/m    Exam:  GENERAL APPEARANCE:  Alert, in no distress, less confused   ENT:  Mouth and posterior oropharynx normal, moist mucous membranes  EYES:  EOM, conjunctivae, lids, pupils and irises normal  RESP:  lungs clear to auscultation , diminished breath sounds throughout, fine crackles posterior   CV:  Palpation and auscultation of heart done , regular rate and irregular rhythm, no murmur, rub, or gallop, trace edema R>L 2+ pitting to ankles now 1+ to legs, ankles and feet-compression off  ABDOMEN:  bowel sounds hypoactive   M/S:  mostly WC   SKIN: limited but intact to visualized areas  NEURO:   Cranial nerves 2-12 are normal tested and grossly at patient's baseline  PSYCH:  insight and judgement impaired, memory impaired    Lab/Diagnostic data:  CBC RESULTS:   Recent Labs   Lab Test 07/03/24  0640 06/28/24  0539   WBC 9.1 9.1   RBC 4.01 4.25   HGB 11.8 12.4   HCT 37.1 38.8   MCV 93 91   MCH 29.4 29.2   MCHC 31.8 32.0   RDW 13.9 13.6    217       Last Basic Metabolic " "Panel:  Recent Labs   Lab Test 06/27/24  0623 06/26/24  1344 06/26/24  0449     --  138  138   POTASSIUM 3.8  --  3.9  3.9   CHLORIDE 101  --  102  102   SHANNON 9.3  --  9.1  9.1   CO2 25  --  25  25   BUN 13.5  --  12.2  12.2   CR 0.51  --  0.57  0.57   * 109* 123*  123*       Liver Function Studies -   Recent Labs   Lab Test 06/26/24  0449 06/25/24  0844   PROTTOTAL 6.5 6.4   ALBUMIN 3.9 4.0   BILITOTAL 0.8 0.3   ALKPHOS 71 63   AST 13 12   ALT 10 <5       TSH   Date Value Ref Range Status   06/25/2024 8.11 (H) 0.30 - 4.20 uIU/mL Final   05/02/2024 2.09 0.30 - 4.20 uIU/mL Final       No results found for: \"A1C\"    ASSESSMENT/PLAN:  (A41.9) Sepsis, due to unspecified organism, unspecified whether acute organ dysfunction present (H)  (primary encounter diagnosis)  Comment: resolved   Plan:   -monitor for s/s of infection     (F03.90) Dementia, unspecified dementia severity, unspecified dementia type, unspecified whether behavioral, psychotic, or mood disturbance or anxiety (H)  Comment: ongoing   Plan:   -moved into memory care   -aricept back to 10 mg po daily     (G20.A1) Parkinson's disease, unspecified whether dyskinesia present, unspecified whether manifestations fluctuate (H)  Comment: chronic   Plan:   -follows with neurology next appt 8/5  -sinemet and comtan per neurology     (I48.0) Paroxysmal atrial fibrillation (H)  (I10) Essential hypertension  Comment: stable   Plan:   -eliquis and rate controlled without BB    (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

## 2024-08-02 NOTE — TELEPHONE ENCOUNTER
Attempted to reach patient to remind them about appointment scheduled with Tanvi Lindquist MD on 8/5/24 in our [place] location.    A voicemail was left with a call back number if the patient has questions or would like to reschedule.

## 2024-08-05 NOTE — PATIENT INSTRUCTIONS
Keep up the great job staying active at [facility]!    We do not need to make any changes to your medications since you are doing well on your current regimen.     I placed a referral for speech therapy to work on your voice and swallowing. Please let me know if there are any issues with getting this scheduled at [facility].

## 2024-08-05 NOTE — LETTER
2024      Jennifer Venegas  C/o Jana Lin  [address] [facility] [address]      Dear Colleague,    Thank you for referring your patient, Jennifer Venegas, to the [facility]. Please see a copy of my visit note below.    Department of Neurology  Movement Disorders Division   Follow-up Note    Patient: Jennifer Venegas   MRN: [id number]   : 1940   Date of Visit: 2024     Chief Complaint:  Jennifer Venegas is a 84 year old female who returns to clinic for follow up of Parkinson's disease.    Interval History:  Ms. Venegas presents with her son, Philip.     She had tried Rytary but this did not go well. It did seem to smooth out peaks and troughs but it seemed like she didn't really get enough benefit. She has been taking Entacapone - added initially before the trial of Rytary. She is still taking it and Philip thinks that it is helping smooth out symptoms. She and Philip think that her medication regimen right now is working pretty well.     She was hospitalized with sepsis about a month ago. Unclear source although they were told it was anaerobic. This really set her back. She was ill while she was trying Rytary transition.     She had tried decreasing Donepezil to 5 mg due to GI side effects. They have since decided to go back up to the 10 mg daily which seemed to help her cognition. She has not had any diarrhea in the past month.     She has been living at [facility] - this is new since her hospitalization. Her old apartment was bigger but she gets a lot of services. She is getting her medications on time. She is trying to teach some folks how to play [place].     She feels that her voice is a little more slurred. She has also been coughing on water about once a day.     She had been working with a variety of therapists when she was at a TCU. There are exercise classes at [facility] but she finds these too simple.     She has not had any falls. She has a  walker for use in [place]. She has a wheelchair to go for outside appointments. She has not noticed any freezing. Her son reports that she struggles with freezing when she is turning. She has a call button for anytime she needs assistance.     Mood has seemed a lot better. She reports some frustration related to waiting for staff at [facility] occasionally.     Sleep is ok when her neighbors are quieter. There was one night when there were a group of younger people (late teens, early twenties) who started making a lot of noise when she was trying to go to bed. She heard a female voice speaking a foreign language. Sleeping tends to be a challenge - tremor can wake her up.     There is no concern for hallucinations.     Current Medications:   Current Outpatient Medications   Medication Sig Dispense Refill     acetaminophen (TYLENOL) 500 MG tablet Take 2 tablets (1,000 mg) by mouth 2 times daily. May also take 2 tablets (1,000 mg) daily as needed for mild pain or fever. 264 tablet 11     apixaban ANTICOAGULANT (ELIQUIS) 5 MG tablet Take 1 tablet (5 mg) by mouth 2 times daily       calcium polycarbophil (FIBERCON) 625 MG tablet Take 2 tablets by mouth daily       carbidopa-levodopa (SINEMET CR)  MG CR tablet Take 1 tablet by mouth at bedtime 90 tablet 3     carbidopa-levodopa (SINEMET CR)  MG CR tablet Take 1 tablet by mouth at bedtime 90 tablet 3     carbidopa-levodopa (SINEMET)  MG tablet Take 1 tablet by mouth 3 times daily 270 tablet 3     donepezil (ARICEPT) 10 MG tablet Take 1 tablet (10 mg) by mouth at bedtime 30 tablet 3     entacapone (COMTAN) 200 MG tablet Take 1 tablet (200 mg) by mouth 3 times daily 270 tablet 3     hydrocortisone 2.5 % cream Apply topically every 6 hours as needed for other (hemorrhoids)       metroNIDAZOLE (METROGEL) 1 % external gel Apply topically daily To forehead and face until resolved.  Avoid contact with eyes. Ok to keep in room 60 g 4     nystatin (MYCOSTATIN)  481417 UNIT/GM external powder Apply topically 2 times daily Apply under breast twice daily for rash flare until healed. Use as directed with any rash flare. Ok to keep bedside. 30 g 11     SENNA-docusate sodium (SENNA S) 8.6-50 MG tablet Take 1 tablet by mouth at bedtime And 1 tab twice daily as needed         Related Medications 7am 11am 3pm 7pm   Carbidopa/Levodopa  mg 1 1 1     Entacapone 200 mg  1 1 1    Carbidopa/Levodopa  mg CR       1   Carbidopa//Levodopa  mg CR       1   Donepezil 10 mg       1        Allergies: is allergic to other [seasonal allergies].    Past Medical History:  Past Medical History:   Diagnosis Date     Acne rosacea      Anemia 1989     Arthritis      Basal cell carcinoma      Cataract, unspecified cataract type, unspecified laterality      Cervicalgia      Degeneration of cervical intervertebral disc 06/1999    C3-C7     Hearing loss      Hyperlipidemia      Lumbago      Osteoarthrosis      Pain in right shoulder      Parkinson's disease (H)      Resting tremor      Rhinitis, allergic      Right leg weakness      Varicose vein of leg        Past Surgical History:  Past Surgical History:   Procedure Laterality Date     APPENDECTOMY  1964     COLONOSCOPY  10/31/2006     EYE SURGERY  10/05/2011    blepharoplasty     GYN SURGERY  1974    Ligate fallopian tube     HERNIA REPAIR  1964    Incisional hernia, reducible     ORTHOPEDIC SURGERY Right 2005    arthroscopy of joint shoulder     ORTHOPEDIC SURGERY Left 04/10/2017    knee arthroplasty     REVERSE ARTHROPLASTY SHOULDER Right 10/25/2018    Procedure: RIGHT REVERSE TOTAL SHOULDER ARTHROPLASTY;  Surgeon: Edd Currie MD;  Location: [facility] OR     SIGMOIDOSCOPY FLEXIBLE  10/2000       Social History:  Social History     Socioeconomic History     Marital status:    Tobacco Use     Smoking status: Never     Smokeless tobacco: Never   Substance and Sexual Activity     Alcohol use: Yes     Comment: occasional     Drug  use: No     Social Determinants of Health     Interpersonal Safety: Unknown (1/22/2024)    Received from [employer]    Humiliation, Afraid, Rape, and Kick questionnaire      Physically Abused: No      Sexually Abused: No   Housing Stability: Unknown (1/22/2024)    Received from [employer]    Housing Stability Vital Sign      Unable to Pay for Housing in the Last Year: No      In the last 12 months, was there a time when you did not have a steady place to sleep or slept in a shelter (including now)?: No       Family History:  Family History   Problem Relation Age of Onset     Neurologic Disorder Mother      Parkinsonism Mother      Dementia Mother      Prostate Cancer Father      Hypertension Father      Prostate Cancer Brother      Heart Disease Brother        Physical Exam:  The patient's  vitals were not taken for this visit.      Last dose of medication at 11am    Neurological Examination:       8/5/2024     4:00 PM   UPDRS Motor Scale   Time: 16:05   Medication On   R Brain DBS: None   L Brain DBS: None   Dyskinesia (LID) No   Did LID interfere No   Speech 1   Facial Expression 2   Rigidity Neck 1   Rigidity RUE 1   Rigidity LUE 1   Rigidity RLE 2   Rigidity LLE 2   Finger Taps R 1   Finger Taps L 2   Hand Mvt R 2   Hand Mvt L 3   Pron-/Supinate R 2   Pron-/Supinate L 3   Toe Tap R 1   Toe Tap L 1   Leg Agility R 1   Leg Agility L 1   Postural Tremor RUE 0   Postural Tremor LUE 0   Kinetic Tremor RUE 0   Kinetic Tremor LUE 0   Rest Tremor RUE 2   Rest Tremor LUE 0   Rest Tremor RLE 0   Rest Tremor LLE 0   Rest Tremor Lip/Jaw 0   Rest Tremor Constancy 3   Total Right 12   Total Left 13       Data Reviewed:   Hospital notes from June 2024  [facility] virtual visit notes with Bonita Ledezma, PharmD    Impression:  Jennifer Venegas is a 84 year old female with Parkinson's disease. Since she was last seen, she was trialed on Rytary which did not seem to adequately control  Parkinson's disease symptoms. She was also hospitalized for sepsis and has moved to a new living facility. Since recovering from her infection, she has improved a great deal and both she and her son report overall satisfaction with her symptom control. Entacapone has seemed to help wearing off although she still does have some motor fluctuations. She and her son prefer not to make any changes at this time. Donepezil was previously decreased due to diarrhea but this resulted in worsening cognition. She has since resumed 10 mg daily and has not had loose stools recently.     She underwent rehab in a TCU recently but would benefit from ongoing speech therapy. Discussed physical therapy referral but she [employer] provides regular activities which she is benefiting from. She has done a fantastic job of staying mentally, socially and physically active at [facility]. I encouraged her to keep up the great work.     Recommendations:     - No changes to medication regimen  - Speech therapy referral placed for dysphagia and voice changes - paper script provided, plan to get through [employer]  - Encouraged continued mental, physical and social activity    Follow up in 6 months    Time Spent: 35 minutes spent on the date of the encounter doing chart review, history and exam, documentation and further activities as noted above    The longitudinal plan of care for the diagnosis(es)/condition(s) as documented were addressed during this visit. Due to the added complexity in care, I will continue to support Jennifer in the subsequent management and with ongoing continuity of care.    Tanvi Lindquist MD  Movement Disorders Fellow    CC: Parkinson's disease      Again, thank you for allowing me to participate in the care of your patient.        Sincerely,        Tanvi Lindquist MD

## 2024-08-05 NOTE — NURSING NOTE
"Jennifer Venegas is a 84 year old female who presents for:  Chief Complaint   Patient presents with    Parkinson     3 month follow up.         Initial Vitals:  /76   Pulse 66   Ht 1.651 m (5' 5\")   SpO2 96%   BMI 28.89 kg/m   Estimated body mass index is 28.89 kg/m  as calculated from the following:    Height as of this encounter: 1.651 m (5' 5\").    Weight as of 7/31/24: 78.7 kg (173 lb 9.6 oz).. Body surface area is 1.9 meters squared. BP completed using cuff size: lauren Bullard   "

## 2024-08-05 NOTE — PROGRESS NOTES
Department of Neurology  Movement Disorders Division   Follow-up Note    Patient: Jennifer Venegas   MRN: [id number]   : 1940   Date of Visit: 2024     Chief Complaint:  Jennifer Venegas is a 84 year old female who returns to clinic for follow up of Parkinson's disease.    Interval History:  Ms. Venegas presents with her son, Philip.     She had tried Rytary but this did not go well. It did seem to smooth out peaks and troughs but it seemed like she didn't really get enough benefit. She has been taking Entacapone - added initially before the trial of Rytary. She is still taking it and Philip thinks that it is helping smooth out symptoms. She and Philip think that her medication regimen right now is working pretty well.     She was hospitalized with sepsis about a month ago. Unclear source although they were told it was anaerobic. This really set her back. She was ill while she was trying Rytary transition.     She had tried decreasing Donepezil to 5 mg due to GI side effects. They have since decided to go back up to the 10 mg daily which seemed to help her cognition. She has not had any diarrhea in the past month.     She has been living at [facility] - this is new since her hospitalization. Her old apartment was bigger but she gets a lot of services. She is getting her medications on time. She is trying to teach some folks how to play [place] [place].     She feels that her voice is a little more slurred. She has also been coughing on water about once a day.     She had been working with a variety of therapists when [facility] was at a [facility]. There are exercise classes at [facility] but she finds these too simple.     She has not had any falls. She has a walker for use in [facility]. She has a wheelchair to go for outside appointments. She has not noticed any freezing. Her son reports that she struggles with freezing when she is turning. She has a call button for anytime she needs assistance.     Mood has seemed a lot better. She  reports some frustration related to waiting for staff at [facility] occasionally.     Sleep is ok when her neighbors are quieter. There was one night when there were a group of younger people (late teens, early twenties) who started making a lot of noise when she was trying to go to bed. She heard a female voice speaking a foreign language. Sleeping tends to be a challenge - tremor can wake her up.     There is no concern for hallucinations.     Current Medications:   Current Outpatient Medications   Medication Sig Dispense Refill    acetaminophen (TYLENOL) 500 MG tablet Take 2 tablets (1,000 mg) by mouth 2 times daily. May also take 2 tablets (1,000 mg) daily as needed for mild pain or fever. 264 tablet 11    apixaban ANTICOAGULANT (ELIQUIS) 5 MG tablet Take 1 tablet (5 mg) by mouth 2 times daily      calcium polycarbophil (FIBERCON) 625 MG tablet Take 2 tablets by mouth daily      carbidopa-levodopa (SINEMET CR)  MG CR tablet Take 1 tablet by mouth at bedtime 90 tablet 3    carbidopa-levodopa (SINEMET CR)  MG CR tablet Take 1 tablet by mouth at bedtime 90 tablet 3    carbidopa-levodopa (SINEMET)  MG tablet Take 1 tablet by mouth 3 times daily 270 tablet 3    donepezil (ARICEPT) 10 MG tablet Take 1 tablet (10 mg) by mouth at bedtime 30 tablet 3    entacapone (COMTAN) 200 MG tablet Take 1 tablet (200 mg) by mouth 3 times daily 270 tablet 3    hydrocortisone 2.5 % cream Apply topically every 6 hours as needed for other (hemorrhoids)      metroNIDAZOLE (METROGEL) 1 % external gel Apply topically daily To forehead and face until resolved.  Avoid contact with eyes. Ok to keep in room 60 g 4    nystatin (MYCOSTATIN) 230657 UNIT/GM external powder Apply topically 2 times daily Apply under breast twice daily for rash flare until healed. Use as directed with any rash flare. Ok to keep bedside. 30 g 11    SENNA-docusate sodium (SENNA S) 8.6-50 MG tablet Take 1 tablet by mouth at bedtime And 1 tab twice daily  as needed         Related Medications 7am 11am 3pm 7pm   Carbidopa/Levodopa  mg 1 1 1     Entacapone 200 mg  1 1 1    Carbidopa/Levodopa  mg CR       1   Carbidopa//Levodopa  mg CR       1   Donepezil 10 mg       1        Allergies: is allergic to other [seasonal allergies].    Past Medical History:  Past Medical History:   Diagnosis Date    Acne rosacea     Anemia 1989    Arthritis     Basal cell carcinoma     Cataract, unspecified cataract type, unspecified laterality     Cervicalgia     Degeneration of cervical intervertebral disc 06/1999    C3-C7    Hearing loss     Hyperlipidemia     Lumbago     Osteoarthrosis     Pain in right shoulder     Parkinson's disease (H)     Resting tremor     Rhinitis, allergic     Right leg weakness     Varicose vein of leg        Past Surgical History:  Past Surgical History:   Procedure Laterality Date    APPENDECTOMY  1964    COLONOSCOPY  10/31/2006    EYE SURGERY  10/05/2011    blepharoplasty    GYN SURGERY  1974    Ligate fallopian tube    HERNIA REPAIR  1964    Incisional hernia, reducible    ORTHOPEDIC SURGERY Right 2005    arthroscopy of joint shoulder    ORTHOPEDIC SURGERY Left 04/10/2017    knee arthroplasty    REVERSE ARTHROPLASTY SHOULDER Right 10/25/2018    Procedure: RIGHT REVERSE TOTAL SHOULDER ARTHROPLASTY;  Surgeon: Edd Currie MD;  Location: [facility] OR    SIGMOIDOSCOPY FLEXIBLE  10/2000       Social History:  Social History     Socioeconomic History    Marital status:    Tobacco Use    Smoking status: Never    Smokeless tobacco: Never   Substance and Sexual Activity    Alcohol use: Yes     Comment: occasional    Drug use: No     Social Determinants of Health     Interpersonal Safety: Unknown (1/22/2024)    Received from [facility]    Humiliation, Afraid, Rape, and Kick questionnaire     Physically Abused: No     Sexually Abused: No   Housing Stability: Unknown (1/22/2024)    Received from [facility]    Housing Stability Vital Sign     Unable to Pay for Housing in the Last Year: No     In the last 12 months, was there a time when you did not have a steady place to sleep or slept in a shelter (including now)?: No       Family History:  Family History   Problem Relation Age of Onset    Neurologic Disorder Mother     Parkinsonism Mother     Dementia Mother     Prostate Cancer Father     Hypertension Father     Prostate Cancer Brother     Heart Disease Brother        Physical Exam:  The patient's  vitals were not taken for this visit.      Last dose of medication at 11am    Neurological Examination:       8/5/2024     4:00 PM   UPDRS Motor Scale   Time: 16:05   Medication On   R Brain DBS: None   L Brain DBS: None   Dyskinesia (LID) No   Did LID interfere No   Speech 1   Facial Expression 2   Rigidity Neck 1   Rigidity RUE 1   Rigidity LUE 1   Rigidity RLE 2   Rigidity LLE 2   Finger Taps R 1   Finger Taps L 2   Hand Mvt R 2   Hand Mvt L 3   Pron-/Supinate R 2   Pron-/Supinate L 3   Toe Tap R 1   Toe Tap L 1   Leg Agility R 1   Leg Agility L 1   Postural Tremor RUE 0   Postural Tremor LUE 0   Kinetic Tremor RUE 0   Kinetic Tremor LUE 0   Rest Tremor RUE 2   Rest Tremor LUE 0   Rest Tremor RLE 0   Rest Tremor LLE 0   Rest Tremor Lip/Jaw 0   Rest Tremor Constancy 3   Total Right 12   Total Left 13       Data Reviewed:   Hospital notes from June 2024  [facility] virtual visit notes with Bonita Ledezma, PharmD    Impression:  Jennifer Venegas is a 84 year old female with Parkinson's disease. Since she was last seen, she was trialed on Rytary which did not seem to adequately control Parkinson's disease symptoms. She was also hospitalized for sepsis and has moved to a new living facility. Since recovering from her infection, she has improved a great deal and both she and her son report overall satisfaction with her symptom control. Entacapone has seemed to help wearing off although she still does have some  motor fluctuations. She and her son prefer not to make any changes at this time. Donepezil was previously decreased due to diarrhea but this resulted in worsening cognition. She has since resumed 10 mg daily and has not had loose stools recently.     She underwent rehab in a TCU recently but would benefit from ongoing speech therapy. Discussed physical therapy referral but she [employer] provides regular activities which she is benefiting from. She has done a fantastic job of staying mentally, socially and physically active at [facility]. I encouraged her to keep up the great work.     Recommendations:     - No changes to medication regimen  - Speech therapy referral placed for dysphagia and voice changes - paper script provided, plan to get through [employer]  - Encouraged continued mental, physical and social activity    Follow up in 6 months    Time Spent: 35 minutes spent on the date of the encounter doing chart review, history and exam, documentation and further activities as noted above    The longitudinal plan of care for the diagnosis(es)/condition(s) as documented were addressed during this visit. Due to the added complexity in care, I will continue to support Jennifer in the subsequent management and with ongoing continuity of care.    Tanvi Lindquist MD  Movement Disorders Fellow    CC: Parkinson's disease

## 2024-08-28 NOTE — ED PROVIDER NOTES
Emergency Department Note      History of Present Illness     Chief Complaint   Shortness of Breath and Hypertension      HPI   Jennifer Venegas is a 84 year old female anticoagulated on Eliquis with history of Parkinson's disease, anemia, hypertension, hyperlipidemia, prediabetes, paroxysmal atrial fibrillation, and senile dementia who presents to the ED via EMS from [facility] for evaluation of shortness of breath and hypertension. Per nurse present, patient has had shortness of breath before which usually resolves with ambulation. Staff today noticed that the shortness of breath was not resolving and sent her to the ED. She was complaining of neck and arm pain for EMS, 9 out of 10. Staff states patient is at baseline mentation.      Jennifer reports shortness of breath with associated left sided non reproducible chest pain radiating into the neck, worse on the right side. States her neck itches. She denies posterior neck pain. Patient oriented to self only at the time of evaluation.    History limited due to patient's baseline dementia.      Independent Historian   Nurse present as detailed above.    Review of External Notes   N/A    Past Medical History     Medical History and Problem List   Acne rosacea  Anemia  Arthritis  Basal cell carcinoma  Cataract  Cervicalgia  Degeneration of cervical intervertebral disc  Hyperlipidemia  Lumbago  Osteoarthritis   Parkinson's disease   Resting tremor  Allergic rhinitis  Varicose vein   Senile dementia  Paroxysmal atrial fibrillation  Volume depletion  Altered mental status  Hypertension   Prediabetes   Sick-euthyroid syndrome  Syncope and collapse     Medications   Eliquis  Fibercon  Sinemet  Aricept  Comtan    Surgical History   Appendectomy  Colonoscopy  Blepharoplasty  Tubal ligation  Reverse right shoulder arthroplasty  Bilateral total knee arthroplasty  Reverse right total shoulder arthroplasty   Sigmoidoscopy flexible  Repair incisional hernia   Flexible  sigmoidoscopy    Cataract removal, bilateral     Physical Exam     Patient Vitals for the past 24 hrs:   BP Temp Temp src Pulse Resp SpO2   08/28/24 0000 (!) 142/89 -- -- 87 -- 97 %   08/27/24 2331 -- -- -- -- -- 90 %   08/27/24 2330 (!) 136/93 -- -- 74 -- --   08/27/24 2300 (!) 135/90 -- -- 84 -- 92 %   08/27/24 2121 (!) 203/117 99  F (37.2  C) Oral 94 18 95 %     Physical Exam    General: Alert and cooperative with exam. Patient in mild distress. Baseline mentation, history of dementia. Frail/elderly appearance.  Head:  Scalp is NC/AT  Eyes:  No scleral icterus, PERRL  ENT:  The external nose and ears are normal. The oropharynx is normal and without erythema; mucus membranes are moist. Uvula midline, no evidence of deep space infection.  Neck:  Normal range of motion without rigidity.  CV:  Regular rate and rhythm    No pathologic murmur   Resp:  Breath sounds are clear bilaterally    Non-labored, no retractions or accessory muscle use  GI:  Abdomen is soft, no distension, no tenderness. No peritoneal signs  MS:  No lower extremity edema   Skin:  Warm and dry, No rash or lesions noted.  Neuro: Oriented x 3. No gross motor deficits.     Diagnostics     Lab Results   Labs Ordered and Resulted from Time of ED Arrival to Time of ED Departure   BASIC METABOLIC PANEL - Abnormal       Result Value    Sodium 138      Potassium 4.0      Chloride 103      Carbon Dioxide (CO2) 24      Anion Gap 11      Urea Nitrogen 12.8      Creatinine 0.63      GFR Estimate 86      Calcium 9.4      Glucose 115 (*)    TROPONIN T, HIGH SENSITIVITY - Abnormal    Troponin T, High Sensitivity 33 (*)    COVID-19 VIRUS (CORONAVIRUS) BY PCR - Normal    SARS CoV2 PCR Negative     CBC WITH PLATELETS AND DIFFERENTIAL    WBC Count 7.8      RBC Count 4.30      Hemoglobin 12.8      Hematocrit 39.8      MCV 93      MCH 29.8      MCHC 32.2      RDW 13.8      Platelet Count 247      % Neutrophils 70      % Lymphocytes 21      % Monocytes 6      %  Eosinophils 2      % Basophils 1      % Immature Granulocytes 0      NRBCs per 100 WBC 0      Absolute Neutrophils 5.5      Absolute Lymphocytes 1.7      Absolute Monocytes 0.5      Absolute Eosinophils 0.1      Absolute Basophils 0.1      Absolute Immature Granulocytes 0.0      Absolute NRBCs 0.0         Imaging   Chest XR,  PA & LAT   Final Result   IMPRESSION: Unchanged cardiac enlargement. Mild pulmonary vascular congestion. Interstitial opacities in the lower lungs. No pulmonary alveolar infiltrate, pneumothorax or pleural fluid. Aortic calcification and tortuosity. Degenerative hypertrophic    changes in the spine. Postoperative changes right shoulder.        ECG  ECG taken at 2122, ECG read at 2125  Sinus rhythm with premature atrial complexes  Anteroseptal infarct, age undetermined  Abnormal ECG   Rate 84 bpm. ME interval 158 ms. QRS duration 80 ms. QT/QTc 380/449 ms. P-R-T axes 66 -29 74.     Independent Interpretation   CXR: No pneumothorax, infiltrate, or pleural effusion.    ED Course      Medications Administered   Medications   acetaminophen (TYLENOL) tablet 650 mg (650 mg Oral $Given 8/27/24 2203)       Procedures   Procedures     Discussion of Management   None    ED Course   ED Course as of 08/27/24 2308 Tue Aug 27, 2024   2143 I obtained history and examined the patient as noted above.        Additional Documentation  None    Medical Decision Making / Diagnosis     CMS Diagnoses: None    MIPS       None    MDM   Jennifer Venegas is a 84 year old female who presents with reported shortness of breath and hypertension; endorses chest pain initial evaluation.  Patient with history of dementia and presents from skilled care facility.  Labs, EKG, and imaging was obtained.  EKG without evidence of acute ischemia, infarct, or significant arrhythmia.  Initial troponin is mildly elevated though not significantly changed from previous.  Delta troponin pending.  Chest x-ray without significant acute findings  as noted above and patient does not have any respiratory symptoms on exam.  She is anticoagulated and there is low clinical suspicion for ACS, PE, or dissection. COVID testing negative.  Remainder of lab workup negative as above.  She was provided Tylenol for pain.  On reassessment patient is asymptomatic and blood pressure demonstrated significant clinical improvement without intervention.  At this time no emergent cause for patient's reported symptoms was determined.  If repeat troponin testing is negative patient can be discharged back to TCU with close follow-up with PCP as needed.    Disposition   Patient signed out by partner Dr. Rosa pending repeat troponin testing.     Diagnosis     ICD-10-CM    1. Chest pain, unspecified type  R07.9       2. SOB (shortness of breath)  R06.02                Scribe Disclosure:  I, Treva Rainey, am serving as a scribe at 9:49 PM on 8/27/2024 to document services personally performed by Madi Espinoza DO based on my observations and the provider's statements to me.        Madi Espinoza DO  08/28/24 0055

## 2024-08-28 NOTE — ED TRIAGE NOTES
Pt BIBA from [facility] care unit, pt complaining of SOB w/ neck and arm pain starting at 2000 today, pt states she is SOB while laying down, staff states that pt is at her baseline mental status of A/O to place and self, ABC intact.       Triage Assessment (Adult)       Row Name 08/27/24 2122          Triage Assessment    Airway WDL WDL        Respiratory WDL    Respiratory WDL X  SOB        Skin Circulation/Temperature WDL    Skin Circulation/Temperature WDL WDL        Cardiac WDL    Cardiac WDL WDL        Peripheral/Neurovascular WDL    Peripheral Neurovascular WDL WDL        Cognitive/Neuro/Behavioral WDL    Cognitive/Neuro/Behavioral WDL WDL

## 2024-08-28 NOTE — ED NOTES
Bed: ED09  Expected date:   Expected time:   Means of arrival:   Comments:  R[facility] 1 84F SOB, /117 eta 2114

## 2024-08-28 NOTE — ED PROVIDER NOTES
This patient was signed out to me awaiting a troponin.  This second troponin is flat at this point to see if she is safe to be discharged.     Shauna Rosa MD  08/28/24 0041

## 2024-09-04 NOTE — PROGRESS NOTES
[facility]  [place] Medical Record Number:  [id number]  Place of Service where encounter took place:  [facility] [[id number]]  Chief Complaint   Patient presents with    RECHECK     Short of breath, Emergency Department follow up       HPI:    Jennifer Venegas  is a 84 year old (1940), who is being seen today for an episodic care visit.  HPI information obtained from: facility chart records, facility staff, patient report, and [facility] chart review. Today's concern is:    Ongoing episodes of SOB mostly at HS. Nursing reports another episode last night and on call for facility was notified. They are elevated HOB and this is helping with symptoms. Appears mostly stable during the days when up in WC. Was in the ED 8/27 with episode and imaging showed mild pulmonary vascular congestion.     Past Medical and Surgical History reviewed in Epic today.    MEDICATIONS:    Current Outpatient Medications   Medication Sig Dispense Refill    acetaminophen (TYLENOL) 500 MG tablet Take 2 tablets (1,000 mg) by mouth 2 times daily. May also take 2 tablets (1,000 mg) daily as needed for mild pain or fever. 264 tablet 11    apixaban ANTICOAGULANT (ELIQUIS) 5 MG tablet Take 1 tablet (5 mg) by mouth 2 times daily      calcium polycarbophil (FIBERCON) 625 MG tablet Take 2 tablets by mouth daily      carbidopa-levodopa (SINEMET CR)  MG CR tablet Take 1 tablet by mouth at bedtime 90 tablet 3    carbidopa-levodopa (SINEMET CR)  MG CR tablet Take 1 tablet by mouth at bedtime 90 tablet 3    carbidopa-levodopa (SINEMET)  MG tablet Take 1 tablet by mouth 3 times daily 270 tablet 3    donepezil (ARICEPT) 10 MG tablet Take 1 tablet (10 mg) by mouth at bedtime 30 tablet 3    entacapone (COMTAN) 200 MG tablet Take 1 tablet (200 mg) by mouth 3 times daily 270 tablet 3    hydrocortisone 2.5 % cream Apply topically every 6 hours as needed for other (hemorrhoids)       "metroNIDAZOLE (METROGEL) 1 % external gel Apply topically daily To forehead and face until resolved.  Avoid contact with eyes. Ok to keep in room 60 g 4    nystatin (MYCOSTATIN) 973878 UNIT/GM external powder Apply topically 2 times daily Apply under breast twice daily for rash flare until healed. Use as directed with any rash flare. Ok to keep bedside. 30 g 11    SENEXON-S 8.6-50 MG tablet TAKE 1 TABLET BY MOUTH AT BEDTIME;AND TAKE ONE TABLET TWICE DAILY AS NEEDED 270 tablet 97     REVIEW OF SYSTEMS:  4 point ROS including Respiratory, CV, GI and , other than that noted in the HPI,  is negative    Objective:  /77   Pulse 86   Temp 97.2  F (36.2  C)   Resp 16   Ht 1.651 m (5' 5\")   Wt 76.5 kg (168 lb 9.6 oz)   SpO2 98%   BMI 28.06 kg/m    Exam:  GENERAL APPEARANCE:  Alert, in no distress, less confused   ENT:  Mouth and posterior oropharynx normal, moist mucous membranes  EYES:  EOM, conjunctivae, lids, pupils and irises normal  RESP:  lungs clear to auscultation , diminished breath sounds throughout, fine crackles posterior   CV:  Palpation and auscultation of heart done , regular rate and irregular rhythm, no murmur, rub, or gallop, trace edema R>L 2+ pitting to ankles now 1+ to legs, ankles and feet-compression off  ABDOMEN:  bowel sounds hypoactive   M/S:  mostly WC   SKIN: reddened forehead and cheeks   NEURO:   Cranial nerves 2-12 are normal tested and grossly at patient's baseline  PSYCH:  insight and judgement impaired, memory impaired    Labs:   CBC RESULTS:   Recent Labs   Lab Test 08/27/24 2207 08/08/24  0637   WBC 7.8 6.8   RBC 4.30 4.35   HGB 12.8 12.8   HCT 39.8 40.1   MCV 93 92   MCH 29.8 29.4   MCHC 32.2 31.9   RDW 13.8 13.9    242       Last Basic Metabolic Panel:  Recent Labs   Lab Test 09/05/24  0904 08/27/24  2207    138   POTASSIUM 4.0 4.0   CHLORIDE 103 103   SHANNON 9.8 9.4   CO2 21* 24   BUN 10.6 12.8   CR 0.45* 0.63   * 115*       Liver Function Studies - " "  Recent Labs   Lab Test 06/26/24  0449 06/25/24  0844   PROTTOTAL 6.5 6.4   ALBUMIN 3.9 4.0   BILITOTAL 0.8 0.3   ALKPHOS 71 63   AST 13 12   ALT 10 <5       TSH   Date Value Ref Range Status   08/08/2024 3.29 0.30 - 4.20 uIU/mL Final   06/25/2024 8.11 (H) 0.30 - 4.20 uIU/mL Final       No results found for: \"A1C\"    ASSESSMENT/PLAN:  (R09.89) Pulmonary vascular congestion  (primary encounter diagnosis)  (R06.02) SOB (shortness of breath)  (R60.0) Bilateral lower extremity edema  (L71.9) Rosacea  Comment:   Plan:   - staff to start daily administration of   metroNIDAZOLE (METROGEL) 1 % external gel Apply topically daily To forehead and face until resolved.  Avoid contact with eyes.      -Please administer Edem[place]ear. Ok to keep on 24/7 and remove for shower days. Hand wash/dry. Do not cut. Family will purchase new pair if facility not able to locate current supply so please reach out to son if unable to locate    -lasix 20 mg po daily for vascular congestion     -BMP next week on lab day for fluid/electrolyte imbalance         Electronically signed by:  NAREN Rothman CNP         "

## 2024-09-04 NOTE — LETTER
Jennifer Sims:       - staff to start daily administration of   metroNIDAZOLE (METROGEL) 1 % external gel Apply topically daily To forehead and face until resolved.  Avoid contact with eyes.          -Please administer Edemawear. Ok to keep on 24/7 and remove for shower days. Hand wash/dry. Do not cut. Family will purchase new pair if facility not able to locate current supply so please reach out to son if unable to locate      -lasix 20 mg po daily for vascular congestion     -BMP next week on lab day for fluid/electrolyte imbalance       Electronically signed by:  NAREN Rothman CNP

## 2024-09-04 NOTE — LETTER
9/4/2024      Jennifer Venegas  C/o Janatres Lin  [address] [place] [address]        [facility]  [place] Medical Record Number:  [id number]  Place of Service where encounter took place:  [facility] [[id number]]  Chief Complaint   Patient presents with     RECHECK     Short of breath, Emergency Department follow up       HPI:    Jennifer Venegas  is a 84 year old (1940), who is being seen today for an episodic care visit.  HPI information obtained from: facility chart records, facility staff, patient report, and [facility] chart review. Today's concern is:    Ongoing episodes of SOB mostly at HS. Nursing reports another episode last night and on call for facility was notified. They are elevated HOB and this is helping with symptoms. Appears mostly stable during the days when up in WC. Was in the ED 8/27 with episode and imaging showed mild pulmonary vascular congestion.     Past Medical and Surgical History reviewed in Epic today.    MEDICATIONS:    Current Outpatient Medications   Medication Sig Dispense Refill     acetaminophen (TYLENOL) 500 MG tablet Take 2 tablets (1,000 mg) by mouth 2 times daily. May also take 2 tablets (1,000 mg) daily as needed for mild pain or fever. 264 tablet 11     apixaban ANTICOAGULANT (ELIQUIS) 5 MG tablet Take 1 tablet (5 mg) by mouth 2 times daily       calcium polycarbophil (FIBERCON) 625 MG tablet Take 2 tablets by mouth daily       carbidopa-levodopa (SINEMET CR)  MG CR tablet Take 1 tablet by mouth at bedtime 90 tablet 3     carbidopa-levodopa (SINEMET CR)  MG CR tablet Take 1 tablet by mouth at bedtime 90 tablet 3     carbidopa-levodopa (SINEMET)  MG tablet Take 1 tablet by mouth 3 times daily 270 tablet 3     donepezil (ARICEPT) 10 MG tablet Take 1 tablet (10 mg) by mouth at bedtime 30 tablet 3     entacapone (COMTAN) 200 MG tablet Take 1 tablet (200 mg) by mouth 3 times daily 270  "tablet 3     hydrocortisone 2.5 % cream Apply topically every 6 hours as needed for other (hemorrhoids)       metroNIDAZOLE (METROGEL) 1 % external gel Apply topically daily To forehead and face until resolved.  Avoid contact with eyes. Ok to keep in room 60 g 4     nystatin (MYCOSTATIN) 534365 UNIT/GM external powder Apply topically 2 times daily Apply under breast twice daily for rash flare until healed. Use as directed with any rash flare. Ok to keep bedside. 30 g 11     SENEXON-S 8.6-50 MG tablet TAKE 1 TABLET BY MOUTH AT BEDTIME;AND TAKE ONE TABLET TWICE DAILY AS NEEDED 270 tablet 97     REVIEW OF SYSTEMS:  4 point ROS including Respiratory, CV, GI and , other than that noted in the HPI,  is negative    Objective:  /77   Pulse 86   Temp 97.2  F (36.2  C)   Resp 16   Ht 1.651 m (5' 5\")   Wt 76.5 kg (168 lb 9.6 oz)   SpO2 98%   BMI 28.06 kg/m    Exam:  GENERAL APPEARANCE:  Alert, in no distress, less confused   ENT:  Mouth and posterior oropharynx normal, moist mucous membranes  EYES:  EOM, conjunctivae, lids, pupils and irises normal  RESP:  lungs clear to auscultation , diminished breath sounds throughout, fine crackles posterior   CV:  Palpation and auscultation of heart done , regular rate and irregular rhythm, no murmur, rub, or gallop, trace edema R>L 2+ pitting to ankles now 1+ to legs, ankles and feet-compression off  ABDOMEN:  bowel sounds hypoactive   M/S:  mostly WC   SKIN: reddened forehead and cheeks   NEURO:   Cranial nerves 2-12 are normal tested and grossly at patient's baseline  PSYCH:  insight and judgement impaired, memory impaired    Labs:   CBC RESULTS:   Recent Labs   Lab Test 08/27/24 2207 08/08/24  0637   WBC 7.8 6.8   RBC 4.30 4.35   HGB 12.8 12.8   HCT 39.8 40.1   MCV 93 92   MCH 29.8 29.4   MCHC 32.2 31.9   RDW 13.8 13.9    242       Last Basic Metabolic Panel:  Recent Labs   Lab Test 09/05/24  0904 08/27/24  2207    138   POTASSIUM 4.0 4.0   CHLORIDE 103 " "103   SHANNON 9.8 9.4   CO2 21* 24   BUN 10.6 12.8   CR 0.45* 0.63   * 115*       Liver Function Studies -   Recent Labs   Lab Test 06/26/24  0449 06/25/24  0844   PROTTOTAL 6.5 6.4   ALBUMIN 3.9 4.0   BILITOTAL 0.8 0.3   ALKPHOS 71 63   AST 13 12   ALT 10 <5       TSH   Date Value Ref Range Status   08/08/2024 3.29 0.30 - 4.20 uIU/mL Final   06/25/2024 8.11 (H) 0.30 - 4.20 uIU/mL Final       No results found for: \"A1C\"    ASSESSMENT/PLAN:  (R09.89) Pulmonary vascular congestion  (primary encounter diagnosis)  (R06.02) SOB (shortness of breath)  (R60.0) Bilateral lower extremity edema  (L71.9) Rosacea  Comment:   Plan:   - staff to start daily administration of   metroNIDAZOLE (METROGEL) 1 % external gel Apply topically daily To forehead and face until resolved.  Avoid contact with eyes.      -Please administer Edem[place]ear. Ok to keep on 24/7 and remove for shower days. Hand wash/dry. Do not cut. Family will purchase new pair if facility not able to locate current supply so please reach out to son if unable to locate    -lasix 20 mg po daily for vascular congestion     -BMP next week on lab day for fluid/electrolyte imbalance         Electronically signed by:  NAREN Rothman CNP           Sincerely,        NAREN Rothman CNP      "

## 2024-10-02 ENCOUNTER — ASSISTED LIVING VISIT (OUTPATIENT)
Dept: GERIATRICS | Facility: CLINIC | Age: 84
End: 2024-10-02
Payer: COMMERCIAL

## 2024-10-02 ENCOUNTER — TELEPHONE (OUTPATIENT)
Dept: GERIATRICS | Facility: CLINIC | Age: 84
End: 2024-10-02

## 2024-10-02 VITALS
RESPIRATION RATE: 16 BRPM | OXYGEN SATURATION: 97 % | HEIGHT: 65 IN | HEART RATE: 73 BPM | DIASTOLIC BLOOD PRESSURE: 72 MMHG | SYSTOLIC BLOOD PRESSURE: 138 MMHG | BODY MASS INDEX: 28.09 KG/M2 | WEIGHT: 168.6 LBS

## 2024-10-02 DIAGNOSIS — R06.02 SOB (SHORTNESS OF BREATH): ICD-10-CM

## 2024-10-02 DIAGNOSIS — R60.0 BILATERAL LOWER EXTREMITY EDEMA: ICD-10-CM

## 2024-10-02 DIAGNOSIS — R09.89 PULMONARY VASCULAR CONGESTION: ICD-10-CM

## 2024-10-02 DIAGNOSIS — R29.6 FALLS FREQUENTLY: ICD-10-CM

## 2024-10-02 DIAGNOSIS — G20.A2 PARKINSON'S DISEASE WITHOUT DYSKINESIA, WITH FLUCTUATING MANIFESTATIONS (H): ICD-10-CM

## 2024-10-02 DIAGNOSIS — L71.9 ROSACEA: Primary | ICD-10-CM

## 2024-10-02 DIAGNOSIS — R52 GENERALIZED PAIN: ICD-10-CM

## 2024-10-02 PROCEDURE — 99349 HOME/RES VST EST MOD MDM 40: CPT | Performed by: NURSE PRACTITIONER

## 2024-10-02 RX ORDER — METRONIDAZOLE 10 MG/G
GEL TOPICAL DAILY
Qty: 60 G | Refills: 4 | Status: SHIPPED | OUTPATIENT
Start: 2024-10-02

## 2024-10-02 RX ORDER — DONEPEZIL HYDROCHLORIDE 10 MG/1
10 TABLET, FILM COATED ORAL AT BEDTIME
Qty: 31 TABLET | Refills: 11 | Status: SHIPPED | OUTPATIENT
Start: 2024-10-02

## 2024-10-02 RX ORDER — PSEUDOEPHED/ACETAMINOPH/DIPHEN 30MG-500MG
TABLET ORAL
Qty: 186 TABLET | Refills: 11 | Status: SHIPPED | OUTPATIENT
Start: 2024-10-02

## 2024-10-02 RX ORDER — BRIMONIDINE 5 MG/G
GEL TOPICAL
Qty: 30 G | Refills: 0 | Status: SHIPPED | OUTPATIENT
Start: 2024-10-02 | End: 2024-10-02

## 2024-10-02 NOTE — TELEPHONE ENCOUNTER
Prior Authorization Retail Medication Request    Medication/Dose: Prior auth for Mirvaso gel 0.33%

## 2024-10-02 NOTE — LETTER
10/2/2024      Jennifer Venegas  C/o Jana Lin  [address] [facility] [address]        [facility]  [facility] Record Number:  [id number]  Place of Service where encounter took place:  [facility] [[id number]]  Chief Complaint   Patient presents with     RECHECK       HPI:    Jennifer Venegas  is a 84 year old (1940), who is being seen today for an episodic care visit.  HPI information obtained from: facility chart records, facility staff, patient report, and [facility] chart review. Today's concern is:    Seen today for follow up to SOB, falls. Unfortunately at time of chart completion resident had another fall. Found on the floor. Does not appear with injury per notes. She is working with PT. Staff say less complaints of SOB since starting lasix. This was happening at HS. She is also sleeping with HOB elevated. Rosacea is spreading and thought Metrogel was ineffective but appears staff has not been applying.     Past Medical and Surgical History reviewed in Epic today.    MEDICATIONS:  Current Outpatient Medications   Medication Sig Dispense Refill     metroNIDAZOLE (METROGEL) 1 % external gel Apply topically daily. To forehead and face until resolved.  Avoid contact with eyes 60 g 4     acetaminophen (TYLENOL) 500 MG tablet TAKE TWO TABLETS (1000MG) BY MOUTH TWICE DAILY;AND TAKE TWO TABLETS (1000MG) DAILY AS NEEDED 186 tablet 11     apixaban ANTICOAGULANT (ELIQUIS) 5 MG tablet Take 1 tablet (5 mg) by mouth 2 times daily       calcium polycarbophil (FIBERCON) 625 MG tablet Take 2 tablets by mouth daily       carbidopa-levodopa (SINEMET CR)  MG CR tablet TAKE 1 TABLET BY MOUTH EVERY EVENING 90 tablet 97     carbidopa-levodopa (SINEMET CR)  MG CR tablet TAKE 1 TABLET BY MOUTH AT BEDTIME 90 tablet 97     carbidopa-levodopa (SINEMET)  MG tablet Take 1 tablet by mouth 3 times daily 270 tablet 3     donepezil (ARICEPT)  "10 MG tablet TAKE 1 TABLET BY MOUTH AT BEDTIME 31 tablet 11     entacapone (COMTAN) 200 MG tablet Take 1 tablet (200 mg) by mouth 3 times daily 270 tablet 3     furosemide (LASIX) 20 MG tablet Take 1 tablet (20 mg) by mouth daily. 30 tablet 11     hydrocortisone 2.5 % cream Apply topically every 6 hours as needed for other (hemorrhoids)       nystatin (MYCOSTATIN) 618210 UNIT/GM external powder Apply topically 2 times daily Apply under breast twice daily for rash flare until healed. Use as directed with any rash flare. Ok to keep bedside. 30 g 11     SENEXON-S 8.6-50 MG tablet TAKE 1 TABLET BY MOUTH AT BEDTIME;AND TAKE ONE TABLET TWICE DAILY AS NEEDED 270 tablet 97     REVIEW OF SYSTEMS:  4 point ROS including Respiratory, CV, GI and , other than that noted in the HPI,  is negative    Objective:  /72   Pulse 73   Resp 16   Ht 1.651 m (5' 5\")   Wt 76.5 kg (168 lb 9.6 oz)   SpO2 97%   BMI 28.06 kg/m    Exam:  GENERAL APPEARANCE:  Alert, in no distress, less confused   ENT:  Mouth and posterior oropharynx normal, moist mucous membranes  EYES:  EOM, conjunctivae, lids, pupils and irises normal  RESP:  lungs clear to auscultation , diminished breath sounds throughout, fine crackles posterior   CV:  Palpation and auscultation of heart done , regular rate and irregular rhythm, no murmur, rub, or gallop, trace edema R>L 2+ pitting to ankles now 1+ to legs, ankles and feet-compression on  ABDOMEN:  bowel sounds hypoactive   M/S:  mostly WC   SKIN: reddened forehead and cheeks expanding   NEURO:   Cranial nerves 2-12 are normal tested and grossly at patient's baseline  PSYCH:  insight and judgement impaired, memory impaired    Labs:   CBC RESULTS:   Recent Labs   Lab Test 08/27/24  2207 08/08/24  0637   WBC 7.8 6.8   RBC 4.30 4.35   HGB 12.8 12.8   HCT 39.8 40.1   MCV 93 92   MCH 29.8 29.4   MCHC 32.2 31.9   RDW 13.8 13.9    242       Last Basic Metabolic Panel:  Recent Labs   Lab Test 09/05/24  0904 " "08/27/24  2207    138   POTASSIUM 4.0 4.0   CHLORIDE 103 103   SHANNON 9.8 9.4   CO2 21* 24   BUN 10.6 12.8   CR 0.45* 0.63   * 115*       Liver Function Studies -   Recent Labs   Lab Test 06/26/24  0449 06/25/24  0844   PROTTOTAL 6.5 6.4   ALBUMIN 3.9 4.0   BILITOTAL 0.8 0.3   ALKPHOS 71 63   AST 13 12   ALT 10 <5       TSH   Date Value Ref Range Status   08/08/2024 3.29 0.30 - 4.20 uIU/mL Final   06/25/2024 8.11 (H) 0.30 - 4.20 uIU/mL Final       No results found for: \"A1C\"    ASSESSMENT/PLAN:  (L71.9) Rosacea  (primary encounter diagnosis)  Comment: gel was not being applied  Plan:   -metroNIDAZOLE (METROGEL) 1 % external gel,   -DISCONTINUED: brimonidine tartrate (MIRVASO)         0.33 % topical gel but will use if metrogel not effective           (R06.02) SOB (shortness of breath)  (R09.89) Pulmonary vascular congestion  (R60.0) Bilateral lower extremity edema  Comment: less SOB  Plan:   -lasix 20 mg po daily  -Edem[place]ear 24/7  -encourage to elevate leg during the day  -HOB elevated at HS    (R29.6) Falls frequently  Comment: with attempts for self transfers  Plan:   -PT/OT  -staff assist with most ADLs      Electronically signed by:  NAREN Rothman CNP             Sincerely,        NAREN Rothman CNP      "

## 2024-10-02 NOTE — PROGRESS NOTES
[facility]  [place] Medical Record Number:  [id number]  Place of Service where encounter took place:  [facility] [[id number]]  Chief Complaint   Patient presents with    RECHECK       HPI:    Jennifer Venegas  is a 84 year old (1940), who is being seen today for an episodic care visit.  HPI information obtained from: facility chart records, facility staff, patient report, and [facility] chart review. Today's concern is:    Seen today for follow up to SOB, falls. Unfortunately at time of chart completion resident had another fall. Found on the floor. Does not appear with injury per notes. She is working with PT. Staff say less complaints of SOB since starting lasix. This was happening at HS. She is also sleeping with HOB elevated. Rosacea is spreading and thought Metrogel was ineffective but appears staff has not been applying.     Past Medical and Surgical History reviewed in Epic today.    MEDICATIONS:  Current Outpatient Medications   Medication Sig Dispense Refill    metroNIDAZOLE (METROGEL) 1 % external gel Apply topically daily. To forehead and face until resolved.  Avoid contact with eyes 60 g 4    acetaminophen (TYLENOL) 500 MG tablet TAKE TWO TABLETS (1000MG) BY MOUTH TWICE DAILY;AND TAKE TWO TABLETS (1000MG) DAILY AS NEEDED 186 tablet 11    apixaban ANTICOAGULANT (ELIQUIS) 5 MG tablet Take 1 tablet (5 mg) by mouth 2 times daily      calcium polycarbophil (FIBERCON) 625 MG tablet Take 2 tablets by mouth daily      carbidopa-levodopa (SINEMET CR)  MG CR tablet TAKE 1 TABLET BY MOUTH EVERY EVENING 90 tablet 97    carbidopa-levodopa (SINEMET CR)  MG CR tablet TAKE 1 TABLET BY MOUTH AT BEDTIME 90 tablet 97    carbidopa-levodopa (SINEMET)  MG tablet Take 1 tablet by mouth 3 times daily 270 tablet 3    donepezil (ARICEPT) 10 MG tablet TAKE 1 TABLET BY MOUTH AT BEDTIME 31 tablet 11    entacapone (COMTAN) 200 MG tablet Take 1 tablet (200 mg)  "by mouth 3 times daily 270 tablet 3    furosemide (LASIX) 20 MG tablet Take 1 tablet (20 mg) by mouth daily. 30 tablet 11    hydrocortisone 2.5 % cream Apply topically every 6 hours as needed for other (hemorrhoids)      nystatin (MYCOSTATIN) 479374 UNIT/GM external powder Apply topically 2 times daily Apply under breast twice daily for rash flare until healed. Use as directed with any rash flare. Ok to keep bedside. 30 g 11    SENEXON-S 8.6-50 MG tablet TAKE 1 TABLET BY MOUTH AT BEDTIME;AND TAKE ONE TABLET TWICE DAILY AS NEEDED 270 tablet 97     REVIEW OF SYSTEMS:  4 point ROS including Respiratory, CV, GI and , other than that noted in the HPI,  is negative    Objective:  /72   Pulse 73   Resp 16   Ht 1.651 m (5' 5\")   Wt 76.5 kg (168 lb 9.6 oz)   SpO2 97%   BMI 28.06 kg/m    Exam:  GENERAL APPEARANCE:  Alert, in no distress, less confused   ENT:  Mouth and posterior oropharynx normal, moist mucous membranes  EYES:  EOM, conjunctivae, lids, pupils and irises normal  RESP:  lungs clear to auscultation , diminished breath sounds throughout, fine crackles posterior   CV:  Palpation and auscultation of heart done , regular rate and irregular rhythm, no murmur, rub, or gallop, trace edema R>L 2+ pitting to ankles now 1+ to legs, ankles and feet-compression on  ABDOMEN:  bowel sounds hypoactive   M/S:  mostly WC   SKIN: reddened forehead and cheeks expanding   NEURO:   Cranial nerves 2-12 are normal tested and grossly at patient's baseline  PSYCH:  insight and judgement impaired, memory impaired    Labs:   CBC RESULTS:   Recent Labs   Lab Test 08/27/24 2207 08/08/24  0637   WBC 7.8 6.8   RBC 4.30 4.35   HGB 12.8 12.8   HCT 39.8 40.1   MCV 93 92   MCH 29.8 29.4   MCHC 32.2 31.9   RDW 13.8 13.9    242       Last Basic Metabolic Panel:  Recent Labs   Lab Test 09/05/24  0904 08/27/24  2207    138   POTASSIUM 4.0 4.0   CHLORIDE 103 103   SHANNON 9.8 9.4   CO2 21* 24   BUN 10.6 12.8   CR 0.45* 0.63 " "  * 115*       Liver Function Studies -   Recent Labs   Lab Test 06/26/24  0449 06/25/24  0844   PROTTOTAL 6.5 6.4   ALBUMIN 3.9 4.0   BILITOTAL 0.8 0.3   ALKPHOS 71 63   AST 13 12   ALT 10 <5       TSH   Date Value Ref Range Status   08/08/2024 3.29 0.30 - 4.20 uIU/mL Final   06/25/2024 8.11 (H) 0.30 - 4.20 uIU/mL Final       No results found for: \"A1C\"    ASSESSMENT/PLAN:  (L71.9) Rosacea  (primary encounter diagnosis)  Comment: gel was not being applied  Plan:   -metroNIDAZOLE (METROGEL) 1 % external gel,   -DISCONTINUED: brimonidine tartrate (MIRVASO)         0.33 % topical gel but will use if metrogel not effective           (R06.02) SOB (shortness of breath)  (R09.89) Pulmonary vascular congestion  (R60.0) Bilateral lower extremity edema  Comment: less SOB  Plan:   -lasix 20 mg po daily  -[place] 24/7  -encourage to elevate leg during the day  -HOB elevated at HS    (R29.6) Falls frequently  Comment: with attempts for self transfers  Plan:   -PT/OT  -staff assist with most ADLs      Electronically signed by:  NAREN Rothman CNP           "

## 2024-10-02 NOTE — LETTER
Jennifer Venegas orders:     Begin     metroNIDAZOLE (METROGEL) 1 % external gel Apply topically daily. To forehead and face until resolved. Avoid contact with eyes         Electronically signed by:  NAREN Rothman CNP

## 2024-10-07 NOTE — TELEPHONE ENCOUNTER
Central Prior Authorization Team   Phone: [phone]    PA Initiation    Medication: Prior auth for Mirvaso gel 0.33%  Insurance Company: [employer] [place] - Phone [phone] Fax [phone]  Pharmacy Filling the Rx: [facility] - [place], [place] - [address] [place] [address]  Filling Pharmacy Phone: [phone]  Filling Pharmacy Fax:    Start Date: 10/7/2024

## 2024-10-08 NOTE — TELEPHONE ENCOUNTER
Prior Authorization Approval    Authorization Effective Date: 7/9/2024  Authorization Expiration Date: 10/7/2025  Medication: Prior auth for Mirvaso gel 0.33%  Reference #:     Insurance Company: [facility] - Phone [phone] Fax [phone]  Which Pharmacy is filling the prescription (Not needed for infusion/clinic administered): [facility] - [address]  Pharmacy Notified: Yes  Patient Notified: Instructed pharmacy to notify patient when script is ready to /ship.

## 2024-10-17 ENCOUNTER — APPOINTMENT (OUTPATIENT)
Dept: CT IMAGING | Facility: CLINIC | Age: 84
DRG: 065 | End: 2024-10-17
Attending: EMERGENCY MEDICINE
Payer: COMMERCIAL

## 2024-10-17 ENCOUNTER — APPOINTMENT (OUTPATIENT)
Dept: GENERAL RADIOLOGY | Facility: CLINIC | Age: 84
DRG: 065 | End: 2024-10-17
Attending: EMERGENCY MEDICINE
Payer: COMMERCIAL

## 2024-10-17 ENCOUNTER — HOSPITAL ENCOUNTER (INPATIENT)
Facility: CLINIC | Age: 84
LOS: 4 days | Discharge: NURSING, REHAB OR LONG-TERM CARE | DRG: 065 | End: 2024-10-21
Attending: EMERGENCY MEDICINE | Admitting: INTERNAL MEDICINE
Payer: COMMERCIAL
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
## 2024-10-17 DIAGNOSIS — I63.9 CEREBROVASCULAR ACCIDENT (CVA), UNSPECIFIED MECHANISM (H): ICD-10-CM

## 2024-10-17 DIAGNOSIS — I63.9 ISCHEMIC STROKE (H): ICD-10-CM

## 2024-10-17 DIAGNOSIS — B96.89 UTI DUE TO KLEBSIELLA SPECIES: Primary | ICD-10-CM

## 2024-10-17 DIAGNOSIS — S00.03XA SCALP HEMATOMA, INITIAL ENCOUNTER: ICD-10-CM

## 2024-10-17 DIAGNOSIS — G20.A1 PARKINSON'S DISEASE (H): ICD-10-CM
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
## 2024-10-17 DIAGNOSIS — N39.0 UTI DUE TO KLEBSIELLA SPECIES: Primary | ICD-10-CM

## 2024-10-17 DIAGNOSIS — G20.A2 PARKINSON'S DISEASE WITHOUT DYSKINESIA, WITH FLUCTUATING MANIFESTATIONS (H): ICD-10-CM

## 2024-10-17 LAB
ABO/RH(D): NORMAL
ALBUMIN UR-MCNC: NEGATIVE MG/DL
ANION GAP SERPL CALCULATED.3IONS-SCNC: 12 MMOL/L (ref 7–15)
ANTIBODY SCREEN: NEGATIVE
APPEARANCE UR: CLEAR
BACTERIA #/AREA URNS HPF: ABNORMAL /HPF
BASOPHILS # BLD AUTO: 0.1 10E3/UL (ref 0–0.2)
BASOPHILS NFR BLD AUTO: 1 %
BILIRUB UR QL STRIP: NEGATIVE
BUN SERPL-MCNC: 12.2 MG/DL (ref 8–23)
CALCIUM SERPL-MCNC: 9.7 MG/DL (ref 8.8–10.4)
CHLORIDE SERPL-SCNC: 99 MMOL/L (ref 98–107)
COLOR UR AUTO: YELLOW
CREAT SERPL-MCNC: 0.52 MG/DL (ref 0.51–0.95)
EGFRCR SERPLBLD CKD-EPI 2021: >90 ML/MIN/1.73M2
EOSINOPHIL # BLD AUTO: 0.1 10E3/UL (ref 0–0.7)
EOSINOPHIL NFR BLD AUTO: 1 %
ERYTHROCYTE [DISTWIDTH] IN BLOOD BY AUTOMATED COUNT: 13.6 % (ref 10–15)
EST. AVERAGE GLUCOSE BLD GHB EST-MCNC: 126 MG/DL
GLUCOSE BLDC GLUCOMTR-MCNC: 119 MG/DL (ref 70–99)
GLUCOSE SERPL-MCNC: 110 MG/DL (ref 70–99)
GLUCOSE UR STRIP-MCNC: NEGATIVE MG/DL
HBA1C MFR BLD: 6 %
HCO3 SERPL-SCNC: 25 MMOL/L (ref 22–29)
HCT VFR BLD AUTO: 40.9 % (ref 35–47)
HGB BLD-MCNC: 13.1 G/DL (ref 11.7–15.7)
HGB UR QL STRIP: NEGATIVE
HOLD SPECIMEN: 0
IMM GRANULOCYTES # BLD: 0 10E3/UL
IMM GRANULOCYTES NFR BLD: 0 %
INR PPP: 1.14 (ref 0.85–1.15)
KETONES UR STRIP-MCNC: 10 MG/DL
LEUKOCYTE ESTERASE UR QL STRIP: ABNORMAL
LYMPHOCYTES # BLD AUTO: 1.3 10E3/UL (ref 0.8–5.3)
LYMPHOCYTES NFR BLD AUTO: 14 %
MCH RBC QN AUTO: 29.1 PG (ref 26.5–33)
MCHC RBC AUTO-ENTMCNC: 32 G/DL (ref 31.5–36.5)
MCV RBC AUTO: 91 FL (ref 78–100)
MONOCYTES # BLD AUTO: 0.5 10E3/UL (ref 0–1.3)
MONOCYTES NFR BLD AUTO: 6 %
MUCOUS THREADS #/AREA URNS LPF: PRESENT /LPF
NEUTROPHILS # BLD AUTO: 7.4 10E3/UL (ref 1.6–8.3)
NEUTROPHILS NFR BLD AUTO: 80 %
NITRATE UR QL: POSITIVE
NRBC # BLD AUTO: 0 10E3/UL
NRBC BLD AUTO-RTO: 0 /100
PH UR STRIP: 7 [PH] (ref 5–7)
PLATELET # BLD AUTO: 244 10E3/UL (ref 150–450)
POTASSIUM SERPL-SCNC: 4.1 MMOL/L (ref 3.4–5.3)
RBC # BLD AUTO: 4.5 10E6/UL (ref 3.8–5.2)
RBC URINE: 2 /HPF
SODIUM SERPL-SCNC: 136 MMOL/L (ref 135–145)
SP GR UR STRIP: 1 (ref 1–1.03)
SPECIMEN EXPIRATION DATE: NORMAL
SQUAMOUS EPITHELIAL: <1 /HPF
TROPONIN T SERPL HS-MCNC: 66 NG/L
UROBILINOGEN UR STRIP-MCNC: NORMAL MG/DL
WBC # BLD AUTO: 9.3 10E3/UL (ref 4–11)
WBC URINE: 63 /HPF

## 2024-10-17 PROCEDURE — 73590 X-RAY EXAM OF LOWER LEG: CPT | Mod: LT

## 2024-10-17 PROCEDURE — 72125 CT NECK SPINE W/O DYE: CPT

## 2024-10-17 PROCEDURE — 96374 THER/PROPH/DIAG INJ IV PUSH: CPT | Mod: 59

## 2024-10-17 PROCEDURE — 70450 CT HEAD/BRAIN W/O DYE: CPT

## 2024-10-17 PROCEDURE — 99223 1ST HOSP IP/OBS HIGH 75: CPT | Performed by: INTERNAL MEDICINE

## 2024-10-17 PROCEDURE — 250N000013 HC RX MED GY IP 250 OP 250 PS 637: Performed by: EMERGENCY MEDICINE

## 2024-10-17 PROCEDURE — 81003 URINALYSIS AUTO W/O SCOPE: CPT | Performed by: EMERGENCY MEDICINE

## 2024-10-17 PROCEDURE — 36415 COLL VENOUS BLD VENIPUNCTURE: CPT | Performed by: INTERNAL MEDICINE

## 2024-10-17 PROCEDURE — 72128 CT CHEST SPINE W/O DYE: CPT

## 2024-10-17 PROCEDURE — 83036 HEMOGLOBIN GLYCOSYLATED A1C: CPT | Performed by: INTERNAL MEDICINE

## 2024-10-17 PROCEDURE — 99285 EMERGENCY DEPT VISIT HI MDM: CPT | Mod: 25

## 2024-10-17 PROCEDURE — 250N000013 HC RX MED GY IP 250 OP 250 PS 637: Performed by: INTERNAL MEDICINE

## 2024-10-17 PROCEDURE — 73030 X-RAY EXAM OF SHOULDER: CPT | Mod: LT

## 2024-10-17 PROCEDURE — 250N000011 HC RX IP 250 OP 636: Performed by: EMERGENCY MEDICINE

## 2024-10-17 PROCEDURE — 87186 SC STD MICRODIL/AGAR DIL: CPT | Performed by: EMERGENCY MEDICINE

## 2024-10-17 PROCEDURE — 85025 COMPLETE CBC W/AUTO DIFF WBC: CPT | Performed by: EMERGENCY MEDICINE

## 2024-10-17 PROCEDURE — 36415 COLL VENOUS BLD VENIPUNCTURE: CPT | Performed by: EMERGENCY MEDICINE

## 2024-10-17 PROCEDURE — 73552 X-RAY EXAM OF FEMUR 2/>: CPT | Mod: LT

## 2024-10-17 PROCEDURE — 85610 PROTHROMBIN TIME: CPT | Performed by: EMERGENCY MEDICINE

## 2024-10-17 PROCEDURE — 85018 HEMOGLOBIN: CPT | Performed by: EMERGENCY MEDICINE

## 2024-10-17 PROCEDURE — 84484 ASSAY OF TROPONIN QUANT: CPT | Performed by: INTERNAL MEDICINE

## 2024-10-17 PROCEDURE — 96375 TX/PRO/DX INJ NEW DRUG ADDON: CPT

## 2024-10-17 PROCEDURE — 80061 LIPID PANEL: CPT | Performed by: INTERNAL MEDICINE

## 2024-10-17 PROCEDURE — 86901 BLOOD TYPING SEROLOGIC RH(D): CPT | Performed by: EMERGENCY MEDICINE

## 2024-10-17 PROCEDURE — 120N000001 HC R&B MED SURG/OB

## 2024-10-17 PROCEDURE — 74177 CT ABD & PELVIS W/CONTRAST: CPT

## 2024-10-17 PROCEDURE — 250N000011 HC RX IP 250 OP 636: Performed by: INTERNAL MEDICINE

## 2024-10-17 PROCEDURE — 86900 BLOOD TYPING SEROLOGIC ABO: CPT | Performed by: EMERGENCY MEDICINE

## 2024-10-17 PROCEDURE — 250N000009 HC RX 250: Performed by: EMERGENCY MEDICINE

## 2024-10-17 PROCEDURE — 80048 BASIC METABOLIC PNL TOTAL CA: CPT | Performed by: EMERGENCY MEDICINE

## 2024-10-17 RX ORDER — CARBIDOPA/LEVODOPA 25MG-250MG
1 TABLET ORAL ONCE
Status: COMPLETED | OUTPATIENT
Start: 2024-10-17 | End: 2024-10-17

## 2024-10-17 RX ORDER — CARBIDOPA/LEVODOPA 25MG-250MG
1 TABLET ORAL 3 TIMES DAILY
Status: DISCONTINUED | OUTPATIENT
Start: 2024-10-18 | End: 2024-10-21 | Stop reason: HOSPADM

## 2024-10-17 RX ORDER — DONEPEZIL HYDROCHLORIDE 10 MG/1
10 TABLET, FILM COATED ORAL AT BEDTIME
Status: DISCONTINUED | OUTPATIENT
Start: 2024-10-17 | End: 2024-10-21 | Stop reason: HOSPADM

## 2024-10-17 RX ORDER — LORAZEPAM 0.5 MG/1
0.5 TABLET ORAL
Status: COMPLETED | OUTPATIENT
Start: 2024-10-17 | End: 2024-10-18

## 2024-10-17 RX ORDER — ONDANSETRON 4 MG/1
4 TABLET, ORALLY DISINTEGRATING ORAL EVERY 6 HOURS PRN
Status: DISCONTINUED | OUTPATIENT
Start: 2024-10-17 | End: 2024-10-21 | Stop reason: HOSPADM

## 2024-10-17 RX ORDER — CARBIDOPA AND LEVODOPA 25; 100 MG/1; MG/1
1 TABLET, EXTENDED RELEASE ORAL EVERY EVENING
Status: DISCONTINUED | OUTPATIENT
Start: 2024-10-17 | End: 2024-10-21 | Stop reason: HOSPADM

## 2024-10-17 RX ORDER — KETOROLAC TROMETHAMINE 15 MG/ML
15 INJECTION, SOLUTION INTRAMUSCULAR; INTRAVENOUS ONCE
Status: COMPLETED | OUTPATIENT
Start: 2024-10-17 | End: 2024-10-17

## 2024-10-17 RX ORDER — CEFTRIAXONE 1 G/1
1 INJECTION, POWDER, FOR SOLUTION INTRAMUSCULAR; INTRAVENOUS EVERY 24 HOURS
Status: DISCONTINUED | OUTPATIENT
Start: 2024-10-17 | End: 2024-10-21 | Stop reason: HOSPADM

## 2024-10-17 RX ORDER — CARBIDOPA AND LEVODOPA 50; 200 MG/1; MG/1
1 TABLET, EXTENDED RELEASE ORAL AT BEDTIME
Status: DISCONTINUED | OUTPATIENT
Start: 2024-10-18 | End: 2024-10-21 | Stop reason: HOSPADM

## 2024-10-17 RX ORDER — ACETAMINOPHEN 500 MG
1000 TABLET ORAL 2 TIMES DAILY
Status: DISCONTINUED | OUTPATIENT
Start: 2024-10-17 | End: 2024-10-21 | Stop reason: HOSPADM

## 2024-10-17 RX ORDER — IOPAMIDOL 755 MG/ML
84 INJECTION, SOLUTION INTRAVASCULAR ONCE
Status: COMPLETED | OUTPATIENT
Start: 2024-10-17 | End: 2024-10-17

## 2024-10-17 RX ORDER — ENTACAPONE 200 MG/1
200 TABLET ORAL 3 TIMES DAILY
Status: DISCONTINUED | OUTPATIENT
Start: 2024-10-18 | End: 2024-10-21 | Stop reason: HOSPADM

## 2024-10-17 RX ORDER — LIDOCAINE 40 MG/G
CREAM TOPICAL
Status: DISCONTINUED | OUTPATIENT
Start: 2024-10-17 | End: 2024-10-21 | Stop reason: HOSPADM

## 2024-10-17 RX ORDER — ENTACAPONE 200 MG/1
200 TABLET ORAL ONCE
Status: COMPLETED | OUTPATIENT
Start: 2024-10-17 | End: 2024-10-17

## 2024-10-17 RX ORDER — MORPHINE SULFATE 2 MG/ML
2 INJECTION, SOLUTION INTRAMUSCULAR; INTRAVENOUS ONCE
Status: COMPLETED | OUTPATIENT
Start: 2024-10-17 | End: 2024-10-17

## 2024-10-17 RX ORDER — ONDANSETRON 2 MG/ML
4 INJECTION INTRAMUSCULAR; INTRAVENOUS EVERY 6 HOURS PRN
Status: DISCONTINUED | OUTPATIENT
Start: 2024-10-17 | End: 2024-10-21 | Stop reason: HOSPADM

## 2024-10-17 RX ADMIN — CARBIDOPA AND LEVODOPA 1 TABLET: 25; 250 TABLET ORAL at 19:30

## 2024-10-17 RX ADMIN — KETOROLAC TROMETHAMINE 15 MG: 15 INJECTION, SOLUTION INTRAMUSCULAR; INTRAVENOUS at 16:34

## 2024-10-17 RX ADMIN — IOPAMIDOL 84 ML: 755 INJECTION, SOLUTION INTRAVENOUS at 16:11

## 2024-10-17 RX ADMIN — CARBIDOPA AND LEVODOPA 1 TABLET: 25; 100 TABLET, EXTENDED RELEASE ORAL at 22:43

## 2024-10-17 RX ADMIN — MORPHINE SULFATE 2 MG: 2 INJECTION, SOLUTION INTRAMUSCULAR; INTRAVENOUS at 16:34

## 2024-10-17 RX ADMIN — CEFTRIAXONE SODIUM 1 G: 1 INJECTION, POWDER, FOR SOLUTION INTRAMUSCULAR; INTRAVENOUS at 21:51

## 2024-10-17 RX ADMIN — SODIUM CHLORIDE 66 ML: 9 INJECTION, SOLUTION INTRAVENOUS at 16:12

## 2024-10-17 RX ADMIN — ENTACAPONE 200 MG: 200 TABLET, FILM COATED ORAL at 19:30

## 2024-10-17 ASSESSMENT — ACTIVITIES OF DAILY LIVING (ADL)
ADLS_ACUITY_SCORE: 39

## 2024-10-17 ASSESSMENT — COLUMBIA-SUICIDE SEVERITY RATING SCALE - C-SSRS
2. HAVE YOU ACTUALLY HAD ANY THOUGHTS OF KILLING YOURSELF IN THE PAST MONTH?: NO
1. IN THE PAST MONTH, HAVE YOU WISHED YOU WERE DEAD OR WISHED YOU COULD GO TO SLEEP AND NOT WAKE UP?: NO
6. HAVE YOU EVER DONE ANYTHING, STARTED TO DO ANYTHING, OR PREPARED TO DO ANYTHING TO END YOUR LIFE?: NO

## 2024-10-17 NOTE — ED TRIAGE NOTES
Patient BIBA from [place]. Patient had fall while transferring from bed to wheelchair. Patient hit head, no LOC. Glucose for . Patient has bleeding laceration to back of head. Patient hypertensive and confused. Confusion is baseline for patient. Patient now c/o head and neck pain.    Patient has Parkinson's medications that are due at 3PM.

## 2024-10-17 NOTE — H&P
[facility]    History and Physical - Hospitalist Service       Date of Admission:  10/17/2024    Assessment & Plan      Jennifer Venegas is a 84 year old female admitted on 10/17/2024.  Jennifer Venegas is a 84 year old female with history of Parkinson's disease, atrial fibrillation on chronic anticoagulation with Eliquis, hypertension, dementia who presented with head injury and neck pain after a fall.    Large left parieto-occipital scalp hematoma  Unwitnessed fall  Possible occipital lobe acute/subacute infarct  -Poor historian.  Presented with unwitnessed fall  -CT head without contrast showed large left parietal occipital scalp hematoma.  It also shows hypodensity and loss of gray-white differentiation in the left occipital lobe concerning for acute/subacute infarct  -MRI ordered  -Every 4 neurochecks  -Stroke neurology consulted  -Fall precaution  -Given scalp hematoma will hold PTA Eliquis for now  -Permissive hypertension, monitor in telemetry  -Lipid panel  -PT OT speech evaluation and treatment    Abnormal UA  -UA noted to be abnormal, patient unable to give any history so will empirically treat the patient  -Follow culture    History of atrial fibrillation on chronic anticoagulation with Eliquis  -Holding PTA Eliquis as above given scalp hematoma, appropriate timing of resumption as guided by stroke neurology    Hypertension  -Holding PTA Lasix to allow permissive hypertension    History of Parkinson's disease  History of dementia  Resume PTA Sinemet, Comtan, Aricept once verified by pharmacy         Diet:  Regular once cleared by speech/bedside swallow evaluation  DVT Prophylaxis: Holding PTA DOAC due to scalp hematoma as above  [place] Catheter: Not present  Lines: None     Cardiac Monitoring: None  Code Status:  DNR/DNI as documented by POLST scanned on July 2024    Clinically Significant Risk Factors Present on Admission                # Drug Induced Coagulation Defect: home  "medication list includes an anticoagulant medication    # Hypertension: Noted on problem list   # Dementia: noted on problem list       # Overweight: Estimated body mass index is 28.06 kg/m  as calculated from the following:    Height as of 10/2/24: 1.651 m (5' 5\").    Weight as of 10/2/24: 76.5 kg (168 lb 9.6 oz).       # Financial/Environmental Concerns:           Disposition Plan     Medically Ready for Discharge: Anticipated in 2-4 Days           Clari Salazar MD  Hospitalist Service  [facility]  Securely message with FlipKey (more info)  Text page via [facility] Paging/Directory     ______________________________________________________________________    Chief Complaint   Unwitnessed fall    History is obtained from the patient, electronic health record, and emergency department physician    History of Present Illness   Jennifer Venegas is a 84 year old female with history of Parkinson's disease, atrial fibrillation on chronic anticoagulation with Eliquis, hypertension, dementia who presented with head injury and neck pain after a fall.    Patient a poor historian and unable to give any history.  History therefore obtained from medical records and talking to the ED physician.  Per report patient had an unwitnessed fall while in her living room.  Currently patient denying any headache, chest pain, dizziness, lightheadedness, nausea, vomiting      Past Medical History    Past Medical History:   Diagnosis Date    Acne rosacea     Anemia 1989    Arthritis     Basal cell carcinoma     Cataract, unspecified cataract type, unspecified laterality     Cervicalgia     Degeneration of cervical intervertebral disc 06/1999    C3-C7    Hearing loss     Hyperlipidemia     Lumbago     Osteoarthrosis     Pain in right shoulder     Parkinson's disease (H)     Resting tremor     Rhinitis, allergic     Right leg weakness     Varicose vein of leg        Past Surgical History   Past Surgical History:   Procedure " Laterality Date    APPENDECTOMY  1964    COLONOSCOPY  10/31/2006    EYE SURGERY  10/05/2011    blepharoplasty    GYN SURGERY  1974    Ligate fallopian tube    HERNIA REPAIR  1964    Incisional hernia, reducible    ORTHOPEDIC SURGERY Right 2005    arthroscopy of joint shoulder    ORTHOPEDIC SURGERY Left 04/10/2017    knee arthroplasty    REVERSE ARTHROPLASTY SHOULDER Right 10/25/2018    Procedure: RIGHT REVERSE TOTAL SHOULDER ARTHROPLASTY;  Surgeon: Edd Currie MD;  Location: [facility] OR    SIGMOIDOSCOPY FLEXIBLE  10/2000       Prior to Admission Medications   Prior to Admission Medications   Prescriptions Last Dose Informant Patient Reported? Taking?   SENEXON-S 8.6-50 MG tablet   No Yes   Sig: TAKE 1 TABLET BY MOUTH AT BEDTIME;AND TAKE ONE TABLET TWICE DAILY AS NEEDED   acetaminophen (TYLENOL) 500 MG tablet   No Yes   Sig: TAKE TWO TABLETS (1000MG) BY MOUTH TWICE DAILY;AND TAKE TWO TABLETS (1000MG) DAILY AS NEEDED   apixaban ANTICOAGULANT (ELIQUIS) 5 MG tablet   Yes Yes   Sig: Take 1 tablet (5 mg) by mouth 2 times daily   calcium polycarbophil (FIBERCON) 625 MG tablet   Yes Yes   Sig: Take 2 tablets by mouth daily   carbidopa-levodopa (SINEMET CR)  MG CR tablet   No Yes   Sig: TAKE 1 TABLET BY MOUTH EVERY EVENING   carbidopa-levodopa (SINEMET CR)  MG CR tablet   No Yes   Sig: TAKE 1 TABLET BY MOUTH AT BEDTIME   carbidopa-levodopa (SINEMET)  MG tablet   No Yes   Sig: Take 1 tablet by mouth 3 times daily   Patient taking differently: Take 1 tablet by mouth 3 times daily. 0700, 1100, 1500   donepezil (ARICEPT) 10 MG tablet   No Yes   Sig: TAKE 1 TABLET BY MOUTH AT BEDTIME   entacapone (COMTAN) 200 MG tablet   No Yes   Sig: Take 1 tablet (200 mg) by mouth 3 times daily   Patient taking differently: Take 200 mg by mouth 3 times daily. 0700, 1100, 1500   furosemide (LASIX) 20 MG tablet   No No   Sig: Take 1 tablet (20 mg) by mouth daily.   hydrocortisone 2.5 % cream   Yes Yes   Sig: Apply topically  every 6 hours as needed for other (hemorrhoids)   metroNIDAZOLE (METROGEL) 1 % external gel   No Yes   Sig: Apply topically daily. To forehead and face until resolved.  Avoid contact with eyes   nystatin (MYCOSTATIN) 871329 UNIT/GM external powder   No Yes   Sig: Apply topically 2 times daily Apply under breast twice daily for rash flare until healed. Use as directed with any rash flare. Ok to keep bedside.      Facility-Administered Medications: None        Review of Systems    The 10 point Review of Systems is limited due to patient's dementia    Physical Exam   Vital Signs: Temp: 98  F (36.7  C) Temp src: Temporal BP: (!) 177/115 Pulse: 105   Resp: 15 SpO2: 96 % O2 Device: None (Room air)    Weight: 0 lbs 0 oz    Exam:  Constitutional: Awake, alert and no distress. Appears comfortable  Head: Normocephalic. No masses, lesions, tenderness or abnormalities  ENMT: ENT exam normal, no neck nodes or sinus tenderness  Cardiovascular: RRR.  No murmurs, no rubs or JVD  Respiratory: Normal WOB,b/l equal air entry, no wheezes or crackles   Gastrointestinal: Abdomen soft, non-tender. BS normal. No masses, organomegaly  : Deferred  Extremities : No edema , no clubbing or cyanosis    Neurologic: Cranial nerves II-XII grossly intact , power symmetrical, Reflexes normal and symmetric. Sensation grossly WNL.     Medical Decision Making       81 MINUTES SPENT BY ME on the date of service doing chart review, history, exam, documentation & further activities per the note.      Data     I have personally reviewed the following data over the past 24 hrs:    9.3  \   13.1   / 244     136 99 12.2 /  110 (H)   4.1 25 0.52 \     INR:  1.14 PTT:  N/A   D-dimer:  N/A Fibrinogen:  N/A       Imaging results reviewed over the past 24 hrs:   Recent Results (from the past 24 hour(s))   XR Shoulder Left G/E 3 Views    Narrative    EXAM: XR SHOULDER LEFT G/E 3 VIEWS  DATE/TIME: 10/17/2024 3:37 PM    INDICATION: fall paibn  COMPARISON: None  available.       Impression    IMPRESSION: Mild degenerative arthrosis of the acromioclavicular and  glenohumeral joint. No acute fracture.    JANETH MATHEWS DO         SYSTEM ID:  [id number]   XR Femur Left 2 Views    Narrative    EXAM: XR FEMUR LEFT 2 VIEWS, XR TIBIA AND FIBULA LEFT 2 VIEWS  DATE/TIME: 10/17/2024 3:38 PM    INDICATION: fall pain  COMPARISON: None available.       Impression    IMPRESSION: Diffuse osseous demineralization. Mild degenerative  arthrosis of the left hip. Left total knee arthroplasty. No acute  fracture.       JANETH MATHEWS DO         SYSTEM ID:  [id number]   XR Tibia and Fibula Left 2 Views    Narrative    EXAM: XR FEMUR LEFT 2 VIEWS, XR TIBIA AND FIBULA LEFT 2 VIEWS  DATE/TIME: 10/17/2024 3:38 PM    INDICATION: fall pain  COMPARISON: None available.       Impression    IMPRESSION: Diffuse osseous demineralization. Mild degenerative  arthrosis of the left hip. Left total knee arthroplasty. No acute  fracture.       JANETH MATHEWS DO         SYSTEM ID:  [id number]   CT Cervical Spine w/o Contrast    Narrative    CT CERVICAL SPINE WITHOUT CONTRAST 10/17/2024 4:10 PM     HISTORY: fall neck pain     TECHNIQUE: Axial images of the cervical spine were obtained without  intravenous contrast. Multiplanar reformations were performed.   Radiation dose for this scan was reduced using automated exposure  control, adjustment of the mA and/or kV according to patient size, or  iterative reconstruction technique.    COMPARISON: None.    FINDINGS: No evidence of fracture. No prevertebral soft tissue  swelling. Minimal grade 1 anterolisthesis of C6 on C7, C7 on T1, and  T2 on T3. Vertebral body height is maintained. No destructive osseous  lesion. Multiple level disc space height loss with associated endplate  osteophyte formation and facet arthropathy. Osseous ankylosis across  the C3-C5 disc spaces and posterior elements. Moderate/severe neural  foraminal narrowing throughout the cervical  spine. No high-grade bony  canal stenosis.    Visualized paraspinous tissues: Two right thyroid nodules, the larger  of which measures up to 17 mm. Bilateral carotid bulb atherosclerotic  calcifications.      Impression    IMPRESSION:   1. No evidence of acute fracture or subluxation in the cervical spine.  2. Right thyroid nodules. Nonurgent dedicated thyroid ultrasound is  recommended for further evaluation.      ERICA HERRERA MD         SYSTEM ID:  [id number]   CT Thoracic Spine w/o Contrast    Narrative    CT THORACIC SPINE WITHOUT CONTRAST   10/17/2024 4:10 PM     HISTORY: head injury     TECHNIQUE: Axial images of the thoracic spine were obtained without  intravenous contrast. Multiplanar reformations were performed.   Radiation dose for this scan was reduced using automated exposure  control, adjustment of the mA and/or kV according to patient size, or  iterative reconstruction technique.    COMPARISON: 6/25/2024 chest radiograph.    FINDINGS:  Diffuse osteopenia, which limits evaluation. Mildly  exaggerated thoracic kyphosis. Grade 1 anterolisthesis of T2 on T3 and  T3 on T4. Minimal thoracic levocurvature and thoracolumbar junction  dextrocurvature. No lucent fracture lines identified. No definite  vertebral body height loss. Osseous ankylosis across multiple disc  spaces. Extensive multilevel disc space height loss. Minimal  multilevel endplate osteophyte formation. Moderate facet arthropathy  at a few levels. Neural foraminal narrowing is greatest/severe at  T7-T8 bilaterally. No high-grade bony canal stenosis.    Minimal dependent atelectasis throughout both lung bases. Scattered  aortoiliac atherosclerotic calcifications. Paraspinal muscle atrophy.      Impression    IMPRESSION:   No evidence of acute fracture or subluxation in the  thoracic spine.    ERICA HERRERA MD         SYSTEM ID:  [id number]   CT Chest/Abdomen/Pelvis w Contrast    Narrative    CT CHEST/ABDOMEN/PELVIS W CONTRAST 10/17/2024 4:11  PM    CLINICAL HISTORY: fall, anticoagulant use, neck pain    TECHNIQUE: CT scan of the chest, abdomen, and pelvis was performed  following injection of IV contrast. Multiplanar reformats were  obtained. Dose reduction techniques were used.   CONTRAST: 66mL Isovue 370    COMPARISON: CXR 8/27/2024, CT abdomen/pelvis 6/28/2024    FINDINGS:   LUNGS AND PLEURA: Mild bibasilar hypoventilatory changes without  evidence of pulmonary laceration or contusion. No pleural effusion or  pneumothorax.    MEDIASTINUM/AXILLAE: No evidence of acute injury. No suspicious  lymphadenopathy. Small hiatal hernia. Heart is at upper limits of  normal in size. Focus of hypoattenuation in the left ventricular apex  (series 4 image 83).    CORONARY ARTERY CALCIFICATION: Moderate.    HEPATOBILIARY: Normal.    PANCREAS: Normal.    SPLEEN: Normal.    ADRENAL GLANDS: Normal.    KIDNEYS/BLADDER: No hydronephrosis. Urinary bladder is minimally  distended with some questionable wall thickening. Minimal surrounding  fat stranding.    BOWEL: Diverticulosis in the colon. No acute inflammatory change. No  obstruction. No mesenteric hematoma or pneumoperitoneum.    PELVIC ORGANS: Unremarkable.    ADDITIONAL FINDINGS: No suspicious lymphadenopathy or abdominal pelvic  free fluid. No evidence of significant peritoneal or retroperitoneal  hematoma.    MUSCULOSKELETAL: No acute bony abnormality. Right shoulder  arthroplasty partially visualized. Likely sequela of diffuse hepatic  skeletal hyperostosis (DISH) in the thoracic spine without definite  fracture but please see report from concurrent spine CT reformats for  additional findings.      Impression    IMPRESSION:  1.  No evidence of acute trauma in the chest, abdomen or pelvis.  2.  Questionable urinary bladder wall thickening, correlate with  urinalysis to exclude cystitis.  3.  Suggestion of hypoattenuation in the left ventricular apex, could  be seen in the setting of old infarct and/or small focus  of thrombus.  Could consider further evaluation with echocardiogram if clinically  indicated.    SAMIR TUTTLE MD         SYSTEM ID:  [id number]   CT Head w/o Contrast    Narrative    CT SCAN OF THE HEAD WITHOUT CONTRAST   10/17/2024 4:12 PM     HISTORY: fall, hit head    TECHNIQUE:  Axial images of the head and coronal reformations without  IV contrast material. Radiation dose for this scan was reduced using  automated exposure control, adjustment of the mA and/or kV according  to patient size, or iterative reconstruction technique.    COMPARISON: None.    FINDINGS: Wedge-shaped hypodensity and loss of gray-white  differentiation in the left occipital lobe. No definite acute  intracranial hemorrhage. No evidence of an intracranial mass. Mild to  moderate generalized parenchymal volume loss with associated ex vacuo  dilatation of the ventricles. Confluent periventricular and deep white  matter hypodensity, favored to reflect advanced sequela of chronic  microvascular ischemia.     The visualized portions of the sinuses and mastoids appear normal. The  bony calvarium and bones of the skull base appear intact. Large left  parieto-occipital scalp hematoma.      Impression    IMPRESSION:     1. Hypodensity and loss of gray-white differentiation in the left  occipital lobe, concerning for an acute/subacute infarct. MRI can be  performed for further evaluation.  2. No definite acute intracranial hemorrhage.  3. Diffuse parenchymal volume loss and white matter changes likely due  to chronic microvascular ischemic change.  4. Large left parieto-occipital scalp hematoma.      ERICA HERRERA MD         SYSTEM ID:  [id number]     Recent Labs   Lab 10/17/24  1312   WBC 9.3   HGB 13.1   MCV 91      INR 1.14      POTASSIUM 4.1   CHLORIDE 99   CO2 25   BUN 12.2   CR 0.52   ANIONGAP 12   SHANNON 9.7   *

## 2024-10-17 NOTE — ED PROVIDER NOTES
Emergency Department Note      History of Present Illness     Chief Complaint   Fall, Head Injury, and Neck Pain    HPI History is limited due to the patient being a poor historian.   Jennifer Venegas is a 84 year old female, on Eliquis, with a history of paroxysmal atrial fibrillation, Parkinson's disease, hypertension, and dementia who presents for evaluation of a head injury and neck pain after a fall. The patient states that she had an unwitnessed fall from standing in her living room and hit her left forehead. States that she may have been reaching for something before the fall but she does not remember. Endorses left ankle pain, left knee pain, and thoracic spine. She is not anticoagulated.    Independent Historian   None    Review of External Notes   None     Past Medical History     Medical History and Problem List   Anemia  Arthritis  Cataract  Hyperlipidemia  Osteoarthritis  Parkinson's disease   Resting tremor   Varicose veins  Senile dementia  Paroxysmal atrial fibrillation   Hypertension     Medications   Sinemet  Donepezil  Entacapone  Furosemide  Eliquis    Surgical History   Appendectomy   Blepharoplasty   Ligate uterine tube  Incisional hernia repair  Shoulder arthroplasty, right     Physical Exam     Patient Vitals for the past 24 hrs:   BP Temp Temp src Pulse Resp SpO2   10/17/24 1740 (!) 177/115 -- -- 107 17 --   10/17/24 1716 -- -- -- 108 19 --   10/17/24 1652 -- -- -- 104 27 --   10/17/24 1645 (!) 177/108 -- -- 105 29 96 %   10/17/24 1404 -- -- -- 85 19 --   10/17/24 1345 -- 98  F (36.7  C) Temporal -- -- --   10/17/24 1307 (!) 159/102 -- -- 87 22 96 %     Physical Exam  Vitals reviewed.   HENT:      Head: Normocephalic.      Comments: Large occipital hematoma abrasion without laceration     Nose: Nose normal.      Mouth/Throat:      Mouth: Mucous membranes are moist.   Eyes:      Pupils: Pupils are equal, round, and reactive to light.   Cardiovascular:      Rate and Rhythm: Normal rate.    Pulmonary:      Effort: Pulmonary effort is normal.   Abdominal:      General: Abdomen is flat.   Musculoskeletal:         General: Normal range of motion.      Cervical back: Normal range of motion. No tenderness.      Comments: Tender with any palpation.  Including chest wall back abdomen extremities   Skin:     General: Skin is warm.      Capillary Refill: Capillary refill takes less than 2 seconds.   Neurological:      General: No focal deficit present.      Mental Status: She is alert. Mental status is at baseline.   Psychiatric:         Mood and Affect: Mood normal.           Diagnostics     Lab Results   Labs Ordered and Resulted from Time of ED Arrival to Time of ED Departure   BASIC METABOLIC PANEL - Abnormal       Result Value    Sodium 136      Potassium 4.1      Chloride 99      Carbon Dioxide (CO2) 25      Anion Gap 12      Urea Nitrogen 12.2      Creatinine 0.52      GFR Estimate >90      Calcium 9.7      Glucose 110 (*)    ROUTINE UA WITH MICROSCOPIC REFLEX TO CULTURE - Abnormal    Color Urine Yellow      Appearance Urine Clear      Glucose Urine Negative      Bilirubin Urine Negative      Ketones Urine 10 (*)     Specific Gravity Urine 1.005      Blood Urine Negative      pH Urine 7.0      Protein Albumin Urine Negative      Urobilinogen Urine Normal      Nitrite Urine Positive (*)     Leukocyte Esterase Urine Large (*)     Bacteria Urine Few (*)     Mucus Urine Present (*)     RBC Urine 2      WBC Urine 63 (*)     Squamous Epithelials Urine <1     INR - Normal    INR 1.14     CBC WITH PLATELETS AND DIFFERENTIAL    WBC Count 9.3      RBC Count 4.50      Hemoglobin 13.1      Hematocrit 40.9      MCV 91      MCH 29.1      MCHC 32.0      RDW 13.6      Platelet Count 244      % Neutrophils 80      % Lymphocytes 14      % Monocytes 6      % Eosinophils 1      % Basophils 1      % Immature Granulocytes 0      NRBCs per 100 WBC 0      Absolute Neutrophils 7.4      Absolute Lymphocytes 1.3      Absolute  Monocytes 0.5      Absolute Eosinophils 0.1      Absolute Basophils 0.1      Absolute Immature Granulocytes 0.0      Absolute NRBCs 0.0     TYPE AND SCREEN, ADULT    ABO/RH(D) O NEG      Antibody Screen Negative      SPECIMEN EXPIRATION DATE [id number]     URINE CULTURE   ABO/RH TYPE AND SCREEN       Imaging   CT Head w/o Contrast   Final Result   IMPRESSION:      1. Hypodensity and loss of gray-white differentiation in the left   occipital lobe, concerning for an acute/subacute infarct. MRI can be   performed for further evaluation.   2. No definite acute intracranial hemorrhage.   3. Diffuse parenchymal volume loss and white matter changes likely due   to chronic microvascular ischemic change.   4. Large left parieto-occipital scalp hematoma.         ERICA HERRERA MD            SYSTEM ID:  [id number]      CT Chest/Abdomen/Pelvis w Contrast   Final Result   IMPRESSION:   1.  No evidence of acute trauma in the chest, abdomen or pelvis.   2.  Questionable urinary bladder wall thickening, correlate with   urinalysis to exclude cystitis.   3.  Suggestion of hypoattenuation in the left ventricular apex, could   be seen in the setting of old infarct and/or small focus of thrombus.   Could consider further evaluation with echocardiogram if clinically   indicated.      SAMIR TUTTLE MD            SYSTEM ID:  [id number]      CT Cervical Spine w/o Contrast   Final Result   IMPRESSION:    1. No evidence of acute fracture or subluxation in the cervical spine.   2. Right thyroid nodules. Nonurgent dedicated thyroid ultrasound is   recommended for further evaluation.         ERICA HERRERA MD            SYSTEM ID:  [id number]      CT Thoracic Spine w/o Contrast   Final Result   IMPRESSION:   No evidence of acute fracture or subluxation in the   thoracic spine.      ERICA HERRERA MD            SYSTEM ID:  [id number]      XR Tibia and Fibula Left 2 Views   Final Result   IMPRESSION: Diffuse osseous demineralization. Mild  degenerative   arthrosis of the left hip. Left total knee arthroplasty. No acute   fracture.          JANETH MATHEWS DO            SYSTEM ID:  [id number]      XR Femur Left 2 Views   Final Result   IMPRESSION: Diffuse osseous demineralization. Mild degenerative   arthrosis of the left hip. Left total knee arthroplasty. No acute   fracture.          JANETH MATHEWS DO            SYSTEM ID:  [id number]      XR Shoulder Left G/E 3 Views   Final Result   IMPRESSION: Mild degenerative arthrosis of the acromioclavicular and   glenohumeral joint. No acute fracture.      JANETH MATHEWS DO            SYSTEM ID:  [id number]        EKG   ECG taken at 1308, ECG read at 1315  Sinus rhythm with premature supraventricular complexes  Minimal voltage criteria for LVH, may be normal variant (R in aVL)  Anteroseptal infarct, age undetermined    Rate 78 bpm. DE interval 182 ms. QRS duration 78 ms. QT/QTc 390/444 ms. P-R-T axes 60 -18 65.    Independent Interpretation   None    ED Course      Medications Administered   Medications   iopamidol (ISOVUE-370) solution 84 mL (84 mLs Intravenous $Given 10/17/24 1611)   sodium chloride 0.9 % bag 500 mL for CT scan flush use (66 mLs Intravenous $Given 10/17/24 1612)   ketorolac (TORADOL) injection 15 mg (15 mg Intravenous $Given 10/17/24 1634)   morphine (PF) injection 2 mg (2 mg Intravenous $Given 10/17/24 1634)       Procedures   Procedures     Discussion of Management   Admitting Hospitalist, Dr. Salazar    ED Course   ED Course as of 10/17/24 1802   u Oct 17, 2024   1420 I obtained history and examined the patient as noted above.    1720 I rechecked and updated the patient.    1736 I spoke with the patient's son, Philip, over the phone and updated them on her care.    1737 I spoke with Dr. Salazar of the hospitalist service regarding admission.      Additional Documentation  None    Medical Decision Making / Diagnosis     CMS Diagnoses: None    MIPS       None    MDM   Jennifer Venegas  "is a 84 year old female who presents with a mechanical fall I suspect though cause of which is unclear.  Patient is a challenging historian and complains of pain \"everywhere\".  Ultimately extensive imaging entertained due to age and fall.  Send head CT is negative for subdural or subarachnoid but does identify a new evolved occipital stroke.  Timing of this is unclear by CT scan.  No signs of hemorrhage.  C-spine cleared using imaging.  Chest and abdominal imaging are negative.  Ultimately due to new stroke and concerns for frequent falls and injury recommended observation admission for further assessment care was discussed with her son Philip and was admitted to observation consider MRI to establish timing of stroke and further prevention medication.      Disposition   The patient was admitted to the hospital.     Diagnosis     ICD-10-CM    1. Scalp hematoma, initial encounter  S00.03XA       2. Ischemic stroke (H)  I63.9       3. Fall at home, initial encounter  W19.XXXA     Y92.009            Scribe Disclosure:  I, Pham Naik, am serving as a scribe at 3:04 PM on 10/17/2024 to document services personally performed by Josue Fields MD based on my observations and the provider's statements to me.        Josue Fields MD  10/20/24 1832    "

## 2024-10-17 NOTE — ED TRIAGE NOTES
Triage Assessment (Adult)       Row Name 10/17/24 7926          Triage Assessment    Airway WDL WDL        Respiratory WDL    Respiratory WDL WDL        Skin Circulation/Temperature WDL    Skin Circulation/Temperature WDL X  bleeding laceration to back of head        Cardiac WDL    Cardiac WDL WDL        Peripheral/Neurovascular WDL    Peripheral Neurovascular WDL WDL        Cognitive/Neuro/Behavioral WDL    Cognitive/Neuro/Behavioral WDL X  confusion at baseline        Pupils (CN II)    Pupil Size Left 2 mm     Pupil Size Right 2 mm        [place] Coma Scale    Best Eye Response 4-->(E4) spontaneous     Best Motor Response 6-->(M6) obeys commands     Best Verbal Response 4-->(V4) confused     [place] Coma Scale Score 14

## 2024-10-17 NOTE — ED NOTES
Bed: ED19  Expected date:   Expected time:   Means of arrival:   Comments:  Bettie [address] 84F fall, head injury, on thinners

## 2024-10-18 ENCOUNTER — APPOINTMENT (OUTPATIENT)
Dept: SPEECH THERAPY | Facility: CLINIC | Age: 84
DRG: 065 | End: 2024-10-18
Attending: INTERNAL MEDICINE
Payer: COMMERCIAL

## 2024-10-18 ENCOUNTER — APPOINTMENT (OUTPATIENT)
Dept: CARDIOLOGY | Facility: CLINIC | Age: 84
DRG: 065 | End: 2024-10-18
Attending: INTERNAL MEDICINE
Payer: COMMERCIAL

## 2024-10-18 ENCOUNTER — APPOINTMENT (OUTPATIENT)
Dept: PHYSICAL THERAPY | Facility: CLINIC | Age: 84
DRG: 065 | End: 2024-10-18
Attending: INTERNAL MEDICINE
Payer: COMMERCIAL

## 2024-10-18 ENCOUNTER — APPOINTMENT (OUTPATIENT)
Dept: MRI IMAGING | Facility: CLINIC | Age: 84
DRG: 065 | End: 2024-10-18
Attending: INTERNAL MEDICINE
Payer: COMMERCIAL

## 2024-10-18 LAB
ANION GAP SERPL CALCULATED.3IONS-SCNC: 9 MMOL/L (ref 7–15)
BUN SERPL-MCNC: 12.7 MG/DL (ref 8–23)
CALCIUM SERPL-MCNC: 9.6 MG/DL (ref 8.8–10.4)
CHLORIDE SERPL-SCNC: 102 MMOL/L (ref 98–107)
CHOLEST SERPL-MCNC: 204 MG/DL
CREAT SERPL-MCNC: 0.66 MG/DL (ref 0.51–0.95)
EGFRCR SERPLBLD CKD-EPI 2021: 85 ML/MIN/1.73M2
ERYTHROCYTE [DISTWIDTH] IN BLOOD BY AUTOMATED COUNT: 13.6 % (ref 10–15)
GLUCOSE BLDC GLUCOMTR-MCNC: 102 MG/DL (ref 70–99)
GLUCOSE BLDC GLUCOMTR-MCNC: 103 MG/DL (ref 70–99)
GLUCOSE BLDC GLUCOMTR-MCNC: 115 MG/DL (ref 70–99)
GLUCOSE BLDC GLUCOMTR-MCNC: 158 MG/DL (ref 70–99)
GLUCOSE SERPL-MCNC: 103 MG/DL (ref 70–99)
HCO3 SERPL-SCNC: 28 MMOL/L (ref 22–29)
HCT VFR BLD AUTO: 38.2 % (ref 35–47)
HDLC SERPL-MCNC: 47 MG/DL
HGB BLD-MCNC: 12.2 G/DL (ref 11.7–15.7)
LDLC SERPL CALC-MCNC: 111 MG/DL
LVEF ECHO: NORMAL
MCH RBC QN AUTO: 29.2 PG (ref 26.5–33)
MCHC RBC AUTO-ENTMCNC: 31.9 G/DL (ref 31.5–36.5)
MCV RBC AUTO: 91 FL (ref 78–100)
NONHDLC SERPL-MCNC: 157 MG/DL
PLATELET # BLD AUTO: 226 10E3/UL (ref 150–450)
POTASSIUM SERPL-SCNC: 4.1 MMOL/L (ref 3.4–5.3)
RBC # BLD AUTO: 4.18 10E6/UL (ref 3.8–5.2)
SODIUM SERPL-SCNC: 139 MMOL/L (ref 135–145)
TRIGL SERPL-MCNC: 231 MG/DL
WBC # BLD AUTO: 6.3 10E3/UL (ref 4–11)

## 2024-10-18 PROCEDURE — 97530 THERAPEUTIC ACTIVITIES: CPT | Mod: GP | Performed by: PHYSICAL THERAPIST

## 2024-10-18 PROCEDURE — 85027 COMPLETE CBC AUTOMATED: CPT | Performed by: INTERNAL MEDICINE

## 2024-10-18 PROCEDURE — 36415 COLL VENOUS BLD VENIPUNCTURE: CPT | Performed by: INTERNAL MEDICINE

## 2024-10-18 PROCEDURE — 97161 PT EVAL LOW COMPLEX 20 MIN: CPT | Mod: GP | Performed by: PHYSICAL THERAPIST

## 2024-10-18 PROCEDURE — 250N000013 HC RX MED GY IP 250 OP 250 PS 637: Performed by: INTERNAL MEDICINE

## 2024-10-18 PROCEDURE — 93306 TTE W/DOPPLER COMPLETE: CPT | Mod: 26 | Performed by: INTERNAL MEDICINE

## 2024-10-18 PROCEDURE — 99418 PROLNG IP/OBS E/M EA 15 MIN: CPT | Mod: FS | Performed by: NURSE PRACTITIONER

## 2024-10-18 PROCEDURE — 80048 BASIC METABOLIC PNL TOTAL CA: CPT | Performed by: INTERNAL MEDICINE

## 2024-10-18 PROCEDURE — 255N000002 HC RX 255 OP 636: Performed by: INTERNAL MEDICINE

## 2024-10-18 PROCEDURE — 99233 SBSQ HOSP IP/OBS HIGH 50: CPT | Performed by: STUDENT IN AN ORGANIZED HEALTH CARE EDUCATION/TRAINING PROGRAM

## 2024-10-18 PROCEDURE — 99223 1ST HOSP IP/OBS HIGH 75: CPT | Mod: FS | Performed by: NURSE PRACTITIONER

## 2024-10-18 PROCEDURE — 70544 MR ANGIOGRAPHY HEAD W/O DYE: CPT

## 2024-10-18 PROCEDURE — 92526 ORAL FUNCTION THERAPY: CPT | Mod: GN

## 2024-10-18 PROCEDURE — 82310 ASSAY OF CALCIUM: CPT | Performed by: INTERNAL MEDICINE

## 2024-10-18 PROCEDURE — 70553 MRI BRAIN STEM W/O & W/DYE: CPT

## 2024-10-18 PROCEDURE — 120N000001 HC R&B MED SURG/OB

## 2024-10-18 PROCEDURE — A9585 GADOBUTROL INJECTION: HCPCS | Performed by: INTERNAL MEDICINE

## 2024-10-18 PROCEDURE — 70549 MR ANGIOGRAPH NECK W/O&W/DYE: CPT

## 2024-10-18 PROCEDURE — 92610 EVALUATE SWALLOWING FUNCTION: CPT | Mod: GN

## 2024-10-18 PROCEDURE — 250N000011 HC RX IP 250 OP 636: Performed by: INTERNAL MEDICINE

## 2024-10-18 RX ORDER — GADOBUTROL 604.72 MG/ML
10 INJECTION INTRAVENOUS ONCE
Status: COMPLETED | OUTPATIENT
Start: 2024-10-18 | End: 2024-10-18

## 2024-10-18 RX ADMIN — CARBIDOPA AND LEVODOPA 1 TABLET: 25; 250 TABLET ORAL at 08:46

## 2024-10-18 RX ADMIN — DONEPEZIL HYDROCHLORIDE 10 MG: 10 TABLET ORAL at 22:01

## 2024-10-18 RX ADMIN — PERFLUTREN 1.5 ML: 6.52 INJECTION, SUSPENSION INTRAVENOUS at 12:02

## 2024-10-18 RX ADMIN — ENTACAPONE 200 MG: 200 TABLET, FILM COATED ORAL at 13:15

## 2024-10-18 RX ADMIN — ENTACAPONE 200 MG: 200 TABLET, FILM COATED ORAL at 19:01

## 2024-10-18 RX ADMIN — CARBIDOPA AND LEVODOPA 1 TABLET: 25; 100 TABLET, EXTENDED RELEASE ORAL at 20:45

## 2024-10-18 RX ADMIN — CALCIUM POLYCARBOPHIL 1250 MG: 625 TABLET, FILM COATED ORAL at 08:47

## 2024-10-18 RX ADMIN — CEFTRIAXONE SODIUM 1 G: 1 INJECTION, POWDER, FOR SOLUTION INTRAMUSCULAR; INTRAVENOUS at 20:40

## 2024-10-18 RX ADMIN — ACETAMINOPHEN 1000 MG: 500 TABLET ORAL at 00:24

## 2024-10-18 RX ADMIN — CARBIDOPA AND LEVODOPA 1 TABLET: 25; 250 TABLET ORAL at 13:15

## 2024-10-18 RX ADMIN — ACETAMINOPHEN 1000 MG: 500 TABLET ORAL at 20:40

## 2024-10-18 RX ADMIN — GADOBUTROL 10 ML: 604.72 INJECTION INTRAVENOUS at 17:46

## 2024-10-18 RX ADMIN — DONEPEZIL HYDROCHLORIDE 10 MG: 10 TABLET ORAL at 00:24

## 2024-10-18 RX ADMIN — CARBIDOPA AND LEVODOPA 1 TABLET: 50; 200 TABLET, EXTENDED RELEASE ORAL at 20:45

## 2024-10-18 RX ADMIN — ACETAMINOPHEN 1000 MG: 500 TABLET ORAL at 08:47

## 2024-10-18 RX ADMIN — CARBIDOPA AND LEVODOPA 1 TABLET: 25; 250 TABLET ORAL at 17:53

## 2024-10-18 RX ADMIN — ENTACAPONE 200 MG: 200 TABLET, FILM COATED ORAL at 08:46

## 2024-10-18 RX ADMIN — LORAZEPAM 0.5 MG: 0.5 TABLET ORAL at 16:02

## 2024-10-18 ASSESSMENT — ACTIVITIES OF DAILY LIVING (ADL)
ADLS_ACUITY_SCORE: 39
ADLS_ACUITY_SCORE: 41
ADLS_ACUITY_SCORE: 41
ADLS_ACUITY_SCORE: 46
ADLS_ACUITY_SCORE: 53
ADLS_ACUITY_SCORE: 53
ADLS_ACUITY_SCORE: 41
ADLS_ACUITY_SCORE: 53
ADLS_ACUITY_SCORE: 41
ADLS_ACUITY_SCORE: 53
ADLS_ACUITY_SCORE: 53
ADLS_ACUITY_SCORE: 41
ADLS_ACUITY_SCORE: 53
ADLS_ACUITY_SCORE: 50
ADLS_ACUITY_SCORE: 46
ADLS_ACUITY_SCORE: 41
ADLS_ACUITY_SCORE: 46
ADLS_ACUITY_SCORE: 57
ADLS_ACUITY_SCORE: 53
ADLS_ACUITY_SCORE: 41
ADLS_ACUITY_SCORE: 41

## 2024-10-18 NOTE — PROGRESS NOTES
"Clinical Swallow Evaluation (CSE):     10/18/24 1011   Appointment Info   Signing Clinician's Name / Credentials (SLP) Jailene Crews MS CCC-SLP   General Information   Onset of Illness/Injury or Date of Surgery 10/17/24   Referring Physician Clari Salazar MD   Patient/Family Therapy Goal Statement (SLP) To eat/drink   Pertinent History of Current Problem Per provider \"Jennifer Venegas is a 84 year old female admitted on 10/17/2024.  Jennifer Venegas is a 84 year old female with history of Parkinson's disease, atrial fibrillation on chronic anticoagulation with Eliquis, hypertension, dementia who presented with head injury and neck pain after a fall.\" CT/head: \" Hypodensity and loss of gray-white differentiation in the left  occipital lobe, concerning for an acute/subacute infarct. MRI can be  performed for further evaluation.\" SLP for swallow evaluation   General Observations Pt sleeping in chair but awakens easily. Upright for full breakfast meal   Pain Assessment   Patient Currently in Pain No   Type of Evaluation   Type of Evaluation Swallow Evaluation   Oral Motor   Oral Musculature generally intact   Structural Abnormalities none present   Mucosal Quality dry   Dentition (Oral Motor)   Dentition (Oral Motor) natural dentition;adequate dentition   Facial Symmetry (Oral Motor)   Facial Symmetry (Oral Motor) WNL   Lip Function (Oral Motor)   Lip Range of Motion (Oral Motor) WNL   Tongue Function (Oral Motor)   Tongue ROM (Oral Motor) WNL   Jaw Function (Oral Motor)   Jaw Function (Oral Motor) WNL   Facial Sensation   Facial Sensation WNL   Cough/Swallow/Gag Reflex (Oral Motor)   Soft Palate/Velum (Oral Motor) WNL   Volitional Throat Clear/Cough (Oral Motor) WNL   Volitional Swallow (Oral Motor) WNL   Vocal Quality/Secretion Management (Oral Motor)   Vocal Quality (Oral Motor) WNL   Secretion Management (Oral Motor) WNL   General Swallowing Observations   Past History of Dysphagia Prior swallow evals (6/2021, 1/2024): " WFL swallows; regular/thin diets recommended   Current Diet/Method of Nutritional Intake (General Swallowing Observations, NIS) regular diet;thin liquids (level 0)   Swallowing Evaluation Clinical swallow evaluation   Clinical Swallow Evaluation   Feeding Assistance frequent cues/help required   Clinical Swallow Evaluation Textures Trialed thin liquids;pureed;solid foods;soft & bite-sized   Clinical Swallow Eval: Thin Liquid Texture Trial   Mode of Presentation, Thin Liquids cup;straw;self-fed   Volume of Liquid or Food Presented 4 oz   Oral Phase of Swallow WFL   Pharyngeal Phase of Swallow intact   Diagnostic Statement slight discoordination and audible swallows at times   Clinical Swallow Evaluation: Puree Solid Texture Trial   Mode of Presentation, Puree spoon;self-fed  (hand over hand assist)   Volume of Puree Presented 8 oz   Oral Phase, Puree WFL   Pharyngeal Phase, Puree intact   Diagnostic Statement no overt clinical signs/sx aspiration   Clinical Swallow Eval: Soft & Bite Sized   Mode of Presentation self-fed  (hand over hand assist)   Volume Presented diced fruit   Oral Phase WFL   Pharyngeal Phase intact   Diagnostic Statement no overt clinical signs/sx aspiration   Clinical Swallow Evaluation: Solid Food Texture Trial   Mode of Presentation self-fed  (hand over hand assist)   Volume Presented larger chunks of fruit   Oral Phase   (prolonged mastication)   Pharyngeal Phase intact   Diagnostic Statement disorganized oral phase, prolonged mastication and mild residue   Swallowing Recommendations   Diet Consistency Recommendations soft & bite-sized (level 6);thin liquids (level 0)   Supervision Level for Intake 1:1 supervision needed  (direct assist: hand over hand assist)   Mode of Delivery Recommendations bolus size, small;food moistened;slow rate of intake   Swallowing Maneuver Recommendations alternate food and liquid intake   Monitoring/Assistance Required (Eating/Swallowing) check mouth frequently for  oral residue/pocketing;cue for finger/lingual sweep if oral pocketing present;stop eating activities when fatigue is present;monitor for cough or change in vocal quality with intake   Recommended Feeding/Eating Techniques (Swallow Eval) maintain upright sitting position for eating;maintain upright posture during/after eating for 30 minutes;minimize distractions during oral intake;moisten oral mucosa prior to intake;provide assist with feeding;set-up and prepare tray   Medication Administration Recommendations, Swallowing (SLP) whole as tolerated, crush if needed   General Therapy Interventions   Planned Therapy Interventions Dysphagia Treatment   Clinical Impression   Criteria for Skilled Therapeutic Interventions Met (SLP Eval) Yes, treatment indicated   SLP Diagnosis mild oral and potential pharyngeal dysphagia   Risks & Benefits of therapy have been explained evaluation/treatment results reviewed;care plan/treatment goals reviewed;risks/benefits reviewed;current/potential barriers reviewed;participants voiced agreement with care plan;participants included;patient   Clinical Impression Comments Clinical swallow evaluation completed with thin liquids, puree solids, soft/bite sized solids, regular solids. Pt currently presents with mild oral and potential pharyngeal dysphagia. ?R sided visual deficits/neglect with pt only attending to L side of tray. Difficulty self-feeding and needed mod assist/hand over hand assist with feeding. WFL labial seal and oral containment. Mild prolonged mastication and oral residue with dryer items. Slight discoordination and audible swallows at times with thin liquids, but no overt clinical signs/sx aspiration noted. Rec diet downgrades.   SLP Total Evaluation Time   Eval: oral/pharyngeal swallow function, clinical swallow Minutes (67229) 12   SLP Goals   Therapy Frequency (SLP Eval) 5 times/week   SLP Predicted Duration/Target Date for Goal Attainment 10/25/24   SLP Goals Swallow    SLP: Safely tolerate diet without signs/symptoms of aspiration Regular diet;Thin liquids;With use of swallow precautions;Independently   Interventions   Interventions Quick Adds Swallowing Dysfunction   Swallowing Dysfunction &/or Oral Function for Feeding   Treatment of Swallowing Dysfunction &/or Oral Function for Feeding Minutes (02958) 10   Symptoms Noted During/After Treatment None   Treatment Detail/Skilled Intervention Trained pt in swallow strategies including liquid wash, single bites/sips and pt required moderate verbal/tactile cueing throughout. Educated on diet modifications/recs, strategies, SLP POC. Will need reinforcement.   SLP Discharge Planning   SLP Plan PO tolerance, strategies, ADAT   SLP Discharge Recommendation   (baseline facility with SLP f/u currently)   SLP Rationale for DC [facility] swallow function   SLP Brief overview of current status  Soft/bite sized solids, thin liquids with direct assist (set up, may need hand over hand but allow to self-feed), slow rate, single bites/sips, liquid wash PRN. Encourage up to chair for meals.   Total Session Time   Total Session Time (sum of timed and untimed services) 22

## 2024-10-18 NOTE — ED NOTES
[facility]  ED Nurse Handoff Report    ED Chief complaint: Fall, Head Injury, and Neck Pain      ED Diagnosis:   Final diagnoses:   Scalp hematoma, initial encounter   Ischemic stroke (H)   Fall at home, initial encounter       Code Status: Inpatient team to discuss code status with patient    Allergies:   Allergies   Allergen Reactions    Other [Seasonal Allergies]      ragweed       Patient Story:        Triage Assessment (Adult)       Row Name 10/17/24 7098          Triage Assessment    Airway WDL WDL        Respiratory WDL    Respiratory WDL WDL        Skin Circulation/Temperature WDL    Skin Circulation/Temperature WDL X  bleeding laceration to back of head        Cardiac WDL    Cardiac WDL WDL        Peripheral/Neurovascular WDL    Peripheral Neurovascular WDL WDL        Cognitive/Neuro/Behavioral WDL    Cognitive/Neuro/Behavioral WDL X  confusion at baseline        Pupils (CN II)    Pupil Size Left 2 mm     Pupil Size Right 2 mm        [place] Coma Scale    Best Eye Response 4-->(E4) spontaneous     Best Motor Response 6-->(M6) obeys commands     Best Verbal Response 4-->(V4) confused     [place] Coma Scale Score 14                         Patient BIBA from [facility]. Patient had fall while transferring from bed to wheelchair. Patient hit head, no LOC. Glucose for . Patient has bleeding laceration to back of head. Patient hypertensive and confused. Confusion is baseline for patient. Patient now c/o head and neck pain.    Patient has Parkinson's medications that are due at 3PM.            Focused Assessment:   Abnormal Labs Resulted from Time of ED Arrival to Time of ED Departure   BASIC METABOLIC PANEL - Abnormal       Result Value    Sodium 136      Potassium 4.1      Chloride 99      Carbon Dioxide (CO2) 25      Anion Gap 12      Urea Nitrogen 12.2      Creatinine 0.52      GFR Estimate >90      Calcium 9.7      Glucose 110 (*)    ROUTINE UA WITH MICROSCOPIC REFLEX TO CULTURE - Abnormal     Color Urine Yellow      Appearance Urine Clear      Glucose Urine Negative      Bilirubin Urine Negative      Ketones Urine 10 (*)     Specific Gravity Urine 1.005      Blood Urine Negative      pH Urine 7.0      Protein Albumin Urine Negative      Urobilinogen Urine Normal      Nitrite Urine Positive (*)     Leukocyte Esterase Urine Large (*)     Bacteria Urine Few (*)     Mucus Urine Present (*)     RBC Urine 2      WBC Urine 63 (*)     Squamous Epithelials Urine <1         CT Head w/o Contrast   Final Result   IMPRESSION:      1. Hypodensity and loss of gray-white differentiation in the left   occipital lobe, concerning for an acute/subacute infarct. MRI can be   performed for further evaluation.   2. No definite acute intracranial hemorrhage.   3. Diffuse parenchymal volume loss and white matter changes likely due   to chronic microvascular ischemic change.   4. Large left parieto-occipital scalp hematoma.         ERICA HERRERA MD            SYSTEM ID:  [id number]      CT Chest/Abdomen/Pelvis w Contrast   Final Result   IMPRESSION:   1.  No evidence of acute trauma in the chest, abdomen or pelvis.   2.  Questionable urinary bladder wall thickening, correlate with   urinalysis to exclude cystitis.   3.  Suggestion of hypoattenuation in the left ventricular apex, could   be seen in the setting of old infarct and/or small focus of thrombus.   Could consider further evaluation with echocardiogram if clinically   indicated.      SAMIR TUTTLE MD            SYSTEM ID:  [id number]      CT Cervical Spine w/o Contrast   Final Result   IMPRESSION:    1. No evidence of acute fracture or subluxation in the cervical spine.   2. Right thyroid nodules. Nonurgent dedicated thyroid ultrasound is   recommended for further evaluation.         ERICA HERRERA MD            SYSTEM ID:  [id number]      CT Thoracic Spine w/o Contrast   Final Result   IMPRESSION:   No evidence of acute fracture or subluxation in the   thoracic  spine.      ERICA HERRERA MD            SYSTEM ID:  [id number]      XR Tibia and Fibula Left 2 Views   Final Result   IMPRESSION: Diffuse osseous demineralization. Mild degenerative   arthrosis of the left hip. Left total knee arthroplasty. No acute   fracture.          JANETH MATHEWS DO            SYSTEM ID:  [id number]      XR Femur Left 2 Views   Final Result   IMPRESSION: Diffuse osseous demineralization. Mild degenerative   arthrosis of the left hip. Left total knee arthroplasty. No acute   fracture.          JANETH MATHEWS DO            SYSTEM ID:  [id number]      XR Shoulder Left G/E 3 Views   Final Result   IMPRESSION: Mild degenerative arthrosis of the acromioclavicular and   glenohumeral joint. No acute fracture.      JANETH MATHEWS DO            SYSTEM ID:  [id number]          Treatments and/or interventions provided:   Medications   iopamidol (ISOVUE-370) solution 84 mL (84 mLs Intravenous $Given 10/17/24 1611)   sodium chloride 0.9 % bag 500 mL for CT scan flush use (66 mLs Intravenous $Given 10/17/24 1612)   ketorolac (TORADOL) injection 15 mg (15 mg Intravenous $Given 10/17/24 1634)   morphine (PF) injection 2 mg (2 mg Intravenous $Given 10/17/24 1634)   carbidopa-levodopa (SINEMET)  MG per tablet 1 tablet (1 tablet Oral $Given 10/17/24 1930)   entacapone (COMTAN) tablet 200 mg (200 mg Oral $Given 10/17/24 1930)       Patient's response to treatments and/or interventions: Improving    To be done/followed up on inpatient unit:  Follow Plan of Care    Does this patient have any cognitive concerns?: Short term memory loss, dementia    Activity level - Baseline/Home:  Stand with Assist and Wheelchair  Activity Level - Current:   Stand with assist x2 and Wheelchair    Patient's Preferred language: English   Needed?: No    Isolation: None  Infection: Not Applicable  Patient tested for COVID 19 prior to admission: NO  Bariatric?: No    Vital Signs:   Vitals:    10/17/24 1740  10/17/24 1742 10/17/24 1828 10/17/24 1915   BP: (!) 177/115  (!) 151/76 (!) 154/117   Pulse: 107 105 108 110   Resp: 17 15 15 16   Temp:       TempSrc:       SpO2:           Cardiac Rhythm:     Was the PSS-3 completed:   Yes  What interventions are required if any?               Family Comments: Family went home  OBS brochure/video discussed/provided to patient/family: N/A    For the majority of the shift this patient's behavior was Green.   Behavioral interventions performed were None.    ED NURSE PHONE NUMBER: [phone]

## 2024-10-18 NOTE — PROGRESS NOTES
RECEIVING UNIT ED HANDOFF REVIEW    ED Nurse Handoff Report was reviewed by: Ania Villegas RN on October 17, 2024 at 8:18 PM

## 2024-10-18 NOTE — PLAN OF CARE
Reason for Admission: Possible acute/subacute infarct, fall w/ scalp hematoma    Cognitive/Mentation: A/Ox2, disoriented to time/place  Neuros/CMS: Intact ex generalized weakness, R neglect  VS: Stable on RA. SBP goal <220  Tele: A Fib CVR.  /GI: Incontinent. Last BM PTA. Purewick in place while in bed.   Pulmonary: LS clear.  Pain: denies.     Drains/Lines: PIV SL  Skin: scattered bruising, scalp hematoma MARGARETH  Activity: Assist x 2 with SS.  Diet: Soft/bite sized with thin liquids. Takes pills crushed w/ pudding.     Therapies recs: tbd  Discharge: pending    Aggression Stoplight Tool: yellow for confusion    End of shift summary: Echo & MRI completed.

## 2024-10-18 NOTE — PROGRESS NOTES
[facility]    Medicine Progress Note - Hospitalist Service    Date of Admission:  10/17/2024    Assessment & Plan   Jennifer Venegas is a 84 year old female with history of Parkinson's disease, atrial fibrillation on chronic anticoagulation with Eliquis, hypertension, dementia who presented on 101/7 with head injury and neck pain after a fall.    Large left parieto-occipital scalp hematoma  Unwitnessed fall  Possible occipital lobe acute/subacute infarct  Pt is a poor historian 2/2 underlying dementia. Presented to the emergency department after an unwitnessed fall at her memory care facility.   * CT head obtained in the ED showed large left parietal occipital scalp hematoma.  It also shows hypodensity and loss of gray-white differentiation in the left occipital lobe concerning for acute/subacute infarct  - Admit to inpatient   - MRI ordered and still pending   - Every 4 neurochecks  - Stroke neurology consulted  - Fall precaution  - Given scalp hematoma will hold PTA Eliquis for now  - Permissive hypertension, monitor in telemetry  - Lipid panel  - PT OT speech evaluation and treatment    Urinary tract infection   UA noted to be abnormal on admission. Urine culture now growing gram negative bacilli  - Continue Ceftriaxone   - Follow culture    History of atrial fibrillation on chronic anticoagulation with Eliquis  -Holding PTA Eliquis as above given scalp hematoma, appropriate timing of resumption as guided by stroke neurology    Hypertension  Dyslipidemia  - Holding PTA Lasix to allow permissive hypertension  - Not on statin PTA     History of Parkinson's disease  History of dementia  Resides in memory care at [facility].   - Resume PTA Sinemet, Comtan, Aricept         Diet: Combination Diet Soft and Bite Sized Diet (level 6); Thin Liquids (level 0) (direct assist (set up, may need hand over hand but allow to self-feed), slow rate, single bites/sips, liquid wash PRN)    DVT Prophylaxis:  "Pneumatic Compression Devices  [place] Catheter: Not present  Lines: None     Cardiac Monitoring: ACTIVE order. Indication: Stroke, acute (48 hours)  Code Status: No CPR- Do NOT Intubate      Clinically Significant Risk Factors Present on Admission                # Drug Induced Coagulation Defect: home medication list includes an anticoagulant medication    # Hypertension: Noted on problem list   # Dementia: noted on problem list       # Overweight: Estimated body mass index is 28.06 kg/m  as calculated from the following:    Height as of 10/2/24: 1.651 m (5' 5\").    Weight as of 10/2/24: 76.5 kg (168 lb 9.6 oz).       # Financial/Environmental Concerns: none         Disposition Plan     Medically Ready for Discharge: Anticipated Tomorrow      Dayan Olivas MD  Hospitalist Service  [facility]  Securely message with "" (more info)  Text page via [employer] Paging/Directory   ______________________________________________________________________    Interval History   NAEO. Pt does not provide any accurate history. Thinks she is in a hotel room. Denies pain or other concerns today.     Physical Exam   Vital Signs: Temp: 98.4  F (36.9  C) Temp src: Oral BP: 125/59 Pulse: 69   Resp: 16 SpO2: 99 % O2 Device: None (Room air)    Weight: 0 lbs 0 oz    Constitutional: Awake, alert, cooperative, no apparent distress.  Eyes: Conjunctiva and pupils examined and normal.  HEENT: Moist mucous membranes, normal dentition.  Respiratory: Clear to auscultation bilaterally, no crackles or wheezing.  Cardiovascular: Regular rate and rhythm, normal S1 and S2, and no murmur noted.  GI: Soft, non-distended, non-tender, normal bowel sounds.  Skin: No rashes, no cyanosis, no edema.  Musculoskeletal: No joint swelling, erythema or tenderness.  Neurologic: Cranial nerves 2-12 intact, normal strength and sensation.  Psychiatric: Alert, oriented to person only, no obvious anxiety or depression.      Medical Decision " Making       60 MINUTES SPENT BY ME on the date of service doing chart review, history, exam, documentation & further activities per the note.      Data     I have personally reviewed the following data over the past 24 hrs:    6.3  \   12.2   / 226     139 102 12.7 /  103 (H)   4.1 28 0.66 \     Trop: N/A BNP: N/A     TSH: N/A T4: N/A A1C: 6.0 (H)     INR:  N/A PTT:  N/A   D-dimer:  N/A Fibrinogen:  N/A       Imaging results reviewed over the past 24 hrs:   Recent Results (from the past 24 hour(s))   XR Shoulder Left G/E 3 Views    Narrative    EXAM: XR SHOULDER LEFT G/E 3 VIEWS  DATE/TIME: 10/17/2024 3:37 PM    INDICATION: fall paibn  COMPARISON: None available.       Impression    IMPRESSION: Mild degenerative arthrosis of the acromioclavicular and  glenohumeral joint. No acute fracture.    JANETH MATHEWS DO         SYSTEM ID:  [id number]   XR Femur Left 2 Views    Narrative    EXAM: XR FEMUR LEFT 2 VIEWS, XR TIBIA AND FIBULA LEFT 2 VIEWS  DATE/TIME: 10/17/2024 3:38 PM    INDICATION: fall pain  COMPARISON: None available.       Impression    IMPRESSION: Diffuse osseous demineralization. Mild degenerative  arthrosis of the left hip. Left total knee arthroplasty. No acute  fracture.       JANETH MATHEWS DO         SYSTEM ID:  [id number]   XR Tibia and Fibula Left 2 Views    Narrative    EXAM: XR FEMUR LEFT 2 VIEWS, XR TIBIA AND FIBULA LEFT 2 VIEWS  DATE/TIME: 10/17/2024 3:38 PM    INDICATION: fall pain  COMPARISON: None available.       Impression    IMPRESSION: Diffuse osseous demineralization. Mild degenerative  arthrosis of the left hip. Left total knee arthroplasty. No acute  fracture.       JANETH MATHEWS DO         SYSTEM ID:  [id number]   CT Cervical Spine w/o Contrast    Narrative    CT CERVICAL SPINE WITHOUT CONTRAST 10/17/2024 4:10 PM     HISTORY: fall neck pain     TECHNIQUE: Axial images of the cervical spine were obtained without  intravenous contrast. Multiplanar reformations were performed.    Radiation dose for this scan was reduced using automated exposure  control, adjustment of the mA and/or kV according to patient size, or  iterative reconstruction technique.    COMPARISON: None.    FINDINGS: No evidence of fracture. No prevertebral soft tissue  swelling. Minimal grade 1 anterolisthesis of C6 on C7, C7 on T1, and  T2 on T3. Vertebral body height is maintained. No destructive osseous  lesion. Multiple level disc space height loss with associated endplate  osteophyte formation and facet arthropathy. Osseous ankylosis across  the C3-C5 disc spaces and posterior elements. Moderate/severe neural  foraminal narrowing throughout the cervical spine. No high-grade bony  canal stenosis.    Visualized paraspinous tissues: Two right thyroid nodules, the larger  of which measures up to 17 mm. Bilateral carotid bulb atherosclerotic  calcifications.      Impression    IMPRESSION:   1. No evidence of acute fracture or subluxation in the cervical spine.  2. Right thyroid nodules. Nonurgent dedicated thyroid ultrasound is  recommended for further evaluation.      ERICA HERRERA MD         SYSTEM ID:  [id number]   CT Thoracic Spine w/o Contrast    Narrative    CT THORACIC SPINE WITHOUT CONTRAST   10/17/2024 4:10 PM     HISTORY: head injury     TECHNIQUE: Axial images of the thoracic spine were obtained without  intravenous contrast. Multiplanar reformations were performed.   Radiation dose for this scan was reduced using automated exposure  control, adjustment of the mA and/or kV according to patient size, or  iterative reconstruction technique.    COMPARISON: 6/25/2024 chest radiograph.    FINDINGS:  Diffuse osteopenia, which limits evaluation. Mildly  exaggerated thoracic kyphosis. Grade 1 anterolisthesis of T2 on T3 and  T3 on T4. Minimal thoracic levocurvature and thoracolumbar junction  dextrocurvature. No lucent fracture lines identified. No definite  vertebral body height loss. Osseous ankylosis across  multiple disc  spaces. Extensive multilevel disc space height loss. Minimal  multilevel endplate osteophyte formation. Moderate facet arthropathy  at a few levels. Neural foraminal narrowing is greatest/severe at  T7-T8 bilaterally. No high-grade bony canal stenosis.    Minimal dependent atelectasis throughout both lung bases. Scattered  aortoiliac atherosclerotic calcifications. Paraspinal muscle atrophy.      Impression    IMPRESSION:   No evidence of acute fracture or subluxation in the  thoracic spine.    ERICA HERRERA MD         SYSTEM ID:  [id number]   CT Chest/Abdomen/Pelvis w Contrast    Narrative    CT CHEST/ABDOMEN/PELVIS W CONTRAST 10/17/2024 4:11 PM    CLINICAL HISTORY: fall, anticoagulant use, neck pain    TECHNIQUE: CT scan of the chest, abdomen, and pelvis was performed  following injection of IV contrast. Multiplanar reformats were  obtained. Dose reduction techniques were used.   CONTRAST: 66mL Isovue 370    COMPARISON: CXR 8/27/2024, CT abdomen/pelvis 6/28/2024    FINDINGS:   LUNGS AND PLEURA: Mild bibasilar hypoventilatory changes without  evidence of pulmonary laceration or contusion. No pleural effusion or  pneumothorax.    MEDIASTINUM/AXILLAE: No evidence of acute injury. No suspicious  lymphadenopathy. Small hiatal hernia. Heart is at upper limits of  normal in size. Focus of hypoattenuation in the left ventricular apex  (series 4 image 83).    CORONARY ARTERY CALCIFICATION: Moderate.    HEPATOBILIARY: Normal.    PANCREAS: Normal.    SPLEEN: Normal.    ADRENAL GLANDS: Normal.    KIDNEYS/BLADDER: No hydronephrosis. Urinary bladder is minimally  distended with some questionable wall thickening. Minimal surrounding  fat stranding.    BOWEL: Diverticulosis in the colon. No acute inflammatory change. No  obstruction. No mesenteric hematoma or pneumoperitoneum.    PELVIC ORGANS: Unremarkable.    ADDITIONAL FINDINGS: No suspicious lymphadenopathy or abdominal pelvic  free fluid. No evidence of  significant peritoneal or retroperitoneal  hematoma.    MUSCULOSKELETAL: No acute bony abnormality. Right shoulder  arthroplasty partially visualized. Likely sequela of diffuse hepatic  skeletal hyperostosis (DISH) in the thoracic spine without definite  fracture but please see report from concurrent spine CT reformats for  additional findings.      Impression    IMPRESSION:  1.  No evidence of acute trauma in the chest, abdomen or pelvis.  2.  Questionable urinary bladder wall thickening, correlate with  urinalysis to exclude cystitis.  3.  Suggestion of hypoattenuation in the left ventricular apex, could  be seen in the setting of old infarct and/or small focus of thrombus.  Could consider further evaluation with echocardiogram if clinically  indicated.    SAMIR TUTTLE MD         SYSTEM ID:  [id number]   CT Head w/o Contrast    Narrative    CT SCAN OF THE HEAD WITHOUT CONTRAST   10/17/2024 4:12 PM     HISTORY: fall, hit head    TECHNIQUE:  Axial images of the head and coronal reformations without  IV contrast material. Radiation dose for this scan was reduced using  automated exposure control, adjustment of the mA and/or kV according  to patient size, or iterative reconstruction technique.    COMPARISON: None.    FINDINGS: Wedge-shaped hypodensity and loss of gray-white  differentiation in the left occipital lobe. No definite acute  intracranial hemorrhage. No evidence of an intracranial mass. Mild to  moderate generalized parenchymal volume loss with associated ex vacuo  dilatation of the ventricles. Confluent periventricular and deep white  matter hypodensity, favored to reflect advanced sequela of chronic  microvascular ischemia.     The visualized portions of the sinuses and mastoids appear normal. The  bony calvarium and bones of the skull base appear intact. Large left  parieto-occipital scalp hematoma.      Impression    IMPRESSION:     1. Hypodensity and loss of gray-white differentiation in the  left  occipital lobe, concerning for an acute/subacute infarct. MRI can be  performed for further evaluation.  2. No definite acute intracranial hemorrhage.  3. Diffuse parenchymal volume loss and white matter changes likely due  to chronic microvascular ischemic change.  4. Large left parieto-occipital scalp hematoma.      ERICA HERRERA MD         SYSTEM ID:  [id number]   Echocardiogram Complete - For age > 60 yrs   Result Value    LVEF  60-65%    Narrative    [id number]  [id number]  [id number]^AMY^TOM     [facility]  Echocardiography Laboratory  [address] [place] [id number]     Name: FLAQUITO PALAFOX  MRN: [id number]  : 1940  Study Date: 10/18/2024 11:34 AM  Age: 84 yrs  Gender: Female  Patient Location: [facility]  Reason For Study: Cerebrovascular Incident  Ordering Physician: TOM REYES  Performed By: Charu Hays     BSA: 1.8 m2  Height: 65 in  Weight: 168 lb  HR: 70  BP: 135/76 mmHg  ______________________________________________________________________________  Procedure  Complete Portable Echo Adult. Definity (NDC #16982-210) given intravenously.  Contrast Definity.  ______________________________________________________________________________  Interpretation Summary     The left ventricle is normal in size.  There is moderate concentric left ventricular hypertrophy.  Left ventricular systolic function is normal. The visual ejection fraction is  60-65%.  Normal left ventricular wall motion  Diastolic Doppler findings (E/E' ratio and/or other parameters) suggest left  ventricular filling pressures are increased.  There is mild (1+) tricuspid regurgitation.  Sinus rhythm was noted.  Compared to prior study, there is no significant change.  ______________________________________________________________________________  Left Ventricle  The left ventricle is normal in size. There is moderate concentric left  ventricular hypertrophy. Left ventricular systolic  function is normal. The  visual ejection fraction is 60-65%. Diastolic Doppler findings (E/E' ratio  and/or other parameters) suggest left ventricular filling pressures are  increased. Normal left ventricular wall motion.     Right Ventricle  The right ventricle is normal in structure, function and size.     Atria  Normal left atrial size. Right atrial size is normal. There is no atrial shunt  seen.     Mitral Valve  There is moderate mitral annular calcification. There is trace mitral  regurgitation.     Tricuspid Valve  The tricuspid valve is normal in structure and function. There is mild (1+)  tricuspid regurgitation. The right ventricular systolic pressure is  approximated at 24.9 mmHg plus the right atrial pressure. IVC diameter <2.1 cm  collapsing >50% with sniff suggests a normal RA pressure of 3 mmHg. Right  ventricle systolic pressure estimate normal.     Aortic Valve  The aortic valve is trileaflet with aortic valve sclerosis. No aortic  regurgitation is present. No aortic stenosis is present.     Pulmonic Valve  The pulmonic valve is not well seen, but is grossly normal. There is trace  pulmonic valvular regurgitation.     Vessels  Normal size aorta.     Pericardium  There is no pericardial effusion.     Rhythm  Sinus rhythm was noted.  ______________________________________________________________________________  MMode/2D Measurements & Calculations     IVSd: 1.4 cm  LVIDd: 3.5 cm  LVIDs: 2.3 cm  LVPWd: 1.2 cm  FS: 36.0 %  LV mass(C)d: 152.7 grams  LV mass(C)dI: 83.1 grams/m2  Ao root diam: 3.2 cm  asc Aorta Diam: 3.4 cm  LVOT diam: 2.1 cm  LVOT area: 3.5 cm2  Ao root diam index Ht(cm/m): 1.9  Ao root diam index BSA (cm/m2): 1.8  Asc Ao diam index BSA (cm/m2): 1.9  Asc Ao diam index Ht(cm/m): 2.1  RWT: 0.66  TAPSE: 2.5 cm     Doppler Measurements & Calculations  MV E max marlene: 80.7 cm/sec  MV A max marlene: 91.2 cm/sec  MV E/A: 0.89     MV dec slope: 314.0 cm/sec2  MV dec time: 0.26 sec  Ao V2 max: 139.2  cm/sec  Ao max P.0 mmHg  Ao V2 mean: 97.7 cm/sec  Ao mean P.3 mmHg  Ao V2 VTI: 27.5 cm  VIVEK(I,D): 2.8 cm2  VIVEK(V,D): 2.6 cm2  LV V1 max P.2 mmHg  LV V1 max: 102.6 cm/sec  LV V1 VTI: 22.5 cm  SV(LVOT): 78.2 ml  SI(LVOT): 42.6 ml/m2  PA acc time: 0.13 sec  TR max vlad: 249.5 cm/sec  TR max P.9 mmHg  AV Vlad Ratio (DI): 0.74  VIVEK Index (cm2/m2): 1.5  E/E' av.6  Lateral E/e': 18.5  Medial E/e': 20.6  RV S Vlad: 22.5 cm/sec     ______________________________________________________________________________  Report approved by: Christina Rizvi 10/18/2024 01:08 PM

## 2024-10-18 NOTE — PHARMACY-CONSULT NOTE
Pharmacy Consult to evaluate for medication related stroke core measures    Jennifer Venegas, 84 year old female admitted for possible stroke on 10/17/2024.    Thrombolytic was not given because of Patient history contraindications    VTE Prophylaxis SCDs /PCDs placed on 10-17-24, as appropriate prior to end of hospital day 2.    Antithrombotic:  cuurently not started yet  Continue antithrombotic therapy on discharge to meet quality measures, unless contraindicated.    Anticoagulation if history of A-fib/flutter: currently not started yet    LDL Cholesterol Calculated   Date Value Ref Range Status   10/17/2024 111 (H) <100 mg/dL Final       No statin has currently started    Recommendations: Start Anticoagulation and statin when appropriate    Thank you for the consult.    Timmy Gonzalez [facility] 10/18/2024 1:22 PM

## 2024-10-18 NOTE — CONSULTS
Care Management Initial Consult    General Information  Assessment completed with: Children, Daughter Jana  Type of CM/SW Visit: Initial Assessment    Primary Care Provider verified and updated as needed: No   Readmission within the last 30 days: no previous admission in last 30 days      Reason for Consult: discharge planning  Advance Care Planning: Advance Care Planning Reviewed: present on chart       Communication Assessment  Patient's communication style: spoken language (English or Bilingual)     Cognitive  Cognitive/Neuro/Behavioral: .WDL except  Level of Consciousness: confused  Arousal Level: arouses to voice  Orientation: disoriented to, time, place, situation  Mood/Behavior: calm  Best Language: 0 - No aphasia  Speech: spontaneous    Living Environment:   People in home: facility resident     Current living Arrangements: residential facility, assisted living ([facility])  Name of Facility: [facility]   Able to return to prior arrangements: yes     Family/Social Support:  Care provided by: self, child(loli), other (see comments)  Provides care for: no one  Marital Status:   Support system: Children          Description of Support System: Supportive, Involved    Support Assessment: Adequate family and caregiver support, Adequate social supports    Current Resources:   Patient receiving home care services: No, lives in [facility]note 3]    Employment/Financial:  Employment Status: retired     Financial Concerns: none     Does the patient's insurance plan have a 3 day qualifying hospital stay waiver?  No    Lifestyle & Psychosocial Needs:  Social Determinants of Health     Food Insecurity: No Food Insecurity (1/22/2024)    Received from [employer]    Hunger Vital Sign     Worried About Running Out of Food in the Last Year: Never true     Ran Out of Food in the Last Year: Never true   Depression: At risk (1/29/2024)    PHQ-2     PHQ-2 Score: 5   Housing Stability:  Unknown (1/22/2024)    Received from [facility]    Housing Stability Vital Sign     Unable to Pay for Housing in the Last Year: No     Number of Places Lived in the Last Year: Not on file     Unstable Housing in the Last Year: No   Tobacco Use: Low Risk  (8/5/2024)    Patient History     Smoking Tobacco Use: Never     Smokeless Tobacco Use: Never     Passive Exposure: Not on file   Financial Resource Strain: Not on file   Alcohol Use: Not on file   Transportation Needs: No Transportation Needs (1/22/2024)    Received from [employer]    PRAPARE - Transportation     Lack of Transportation (Medical): No     Lack of Transportation (Non-Medical): No   Physical Activity: Not on file   Interpersonal Safety: Unknown (1/22/2024)    Received from [facility]    Humiliation, Afraid, Rape, and Kick questionnaire     Fear of Current or Ex-Partner: Not on file     Emotionally Abused: Not on file     Physically Abused: No     Sexually Abused: No   Stress: Not on file   Social Connections: Not on file   Health Literacy: Not on file     Functional Status:  Prior to admission patient needed assistance:   Dependent ADLs:: Ambulation-walker  Dependent IADLs:: Cleaning, Cooking, Meal Preparation, Medication Management, Money Management, Transportation, Laundry, Shopping     Mental Health Status:  Mental Health Status: No Current Concerns     Chemical Dependency Status:  Chemical Dependency Status: No Current Concerns          Values/Beliefs:  Spiritual, Cultural Beliefs, Congregational Practices, Values that affect care: yes          Values/Beliefs Comment: Jarrett Benavides    Discussed  Partnership in Safe Discharge Planning  document with patient/family: No    Additional Information:  SW consulted for discharge planning and completed chart review. Per ED provider notes, patient presented via EMS after having a fall at her memory care. Patient is confused so SW contacted her healthcare  "agent/daughter Jana to introduce self/role and explain discharge planning process. Jana confirms that patient resides at [facility]. She uses a walker at all times. She was in the TCU a few months ago very briefly and did not like it because it was \"depressing and like a hospital\". She said that she understands patient fell while at lunch at [facility] yesterday, and she is very focused with SW today on whether or not the patient had a stroke. She was hoping mostly for a medical update, SW redirected her several times back to the nurse and hospitalist. Jana lives in [place] and patient's sons live locally here. They are hoping she can discharge back to her [facility] memory [facility] unit. SW explained that therapies will assess and make recommendations for an appropriate next step. If patient were in need of rehab, she would need [employer] insurance prior-authorization.     Dalila Gonzalez, PHI, SW   Social Work   [facility]    "

## 2024-10-18 NOTE — CONSULTS
[facility]    Stroke Consult Note    Reason for Consult:  hypodensity and loss of gray-white differentiation in the left  occipital lobe, concerning for an acute/subacute infarct.     Chief Complaint: Fall, Head Injury, and Neck Pain       HPI  Jennifer Venegas is a 84 year old with PMH of Parkinson's disease, Atrial fibrillation on Eliquis, and dementia. The patient states that she had a fall from standing in her living room and hit her left forehead. States that she may have been reaching for something before the fall but she does not remember. In general,  history is limited by her dementia. She shares that she is in assisted living and someone gives her medications.     CTH was concerning for left occipital hypodensity, MRI brain is pending. Today, Jennifer is lying in bed does not remember the events prior to the fall. She denies focal neurologic deficits.     Stroke Evaluation Summarized    MRI/Head CT CTH: Hypodensity and loss of gray-white differentiation in the left occipital lobe, concerning for an acute/subacute infarct  MRI: Pending   Intracranial Vasculature Pending   Cervical Vasculature Pending     Echocardiogram EF 60-65%, moderate concentric LVH, normal LA   EKG/Telemetry Sinus Rhythm   Other Testing CT CAP: no evidence of acute trauma, noted to have hypo attenuation in the left ventricular apex concerning for old infarct vs thrombus.      LDL  10/17/2024: 111 mg/dL   A1C  10/17/2024: 6.0 %   Troponin 10/17/2024: 66 ng/L       Impression  Suspected recent left occipital stroke due to undetermined etiology. She has a history of atrial fibrillation.     Recommendations   - Neurochecks and Vital Signs every 4 hrs  - Permissive HTN; goal SBP < 220 mmHg.  Hold Lasix  - Eliquis held due to large scalp hematoma  - Not on statin PTA, . Will start atorvastatin 20 mg daily, titrate to goal LDL 40-70  - A1c within goal <7.0  - Telemetry, EKG  - Bedside Glucose Monitoring  -  "PT/OT/SLP  - Stroke Education  - Euthermia, Euglycemia    Pending Evaluation  - MRI brain w/wo  - MRA head w/o  - MRA neck w/wo    Patient Follow-up    - final recommendation pending work-up    Thank you for this consult. We will continue to follow.     The Stroke Staff is Dr. Mendosa.    Wilber Bonilla NP Student    To page a member of the stroke/neurocritical care service, click here:  [facility]   Choose \"On Call\" tab at top, then search dropdown box for \"Neurology Adult\", select location, press Enter, then look for stroke/neuro ICU/telestroke.    I, Daisy Castellanos CNP, was present with the medical/KENTRELL student who participated in the service and in the documentation of the note.  I have verified the history and personally performed the physical exam and medical decision making.  I agree with the assessment and plan of care as documented in the note.       Daisy Castellanos CNP  _____________________________________________________    Clinically Significant Risk Factors Present on Admission                # Drug Induced Coagulation Defect: home medication list includes an anticoagulant medication    # Hypertension: Noted on problem list   # Dementia: noted on problem list       # Overweight: Estimated body mass index is 28.06 kg/m  as calculated from the following:    Height as of 10/2/24: 1.651 m (5' 5\").    Weight as of 10/2/24: 76.5 kg (168 lb 9.6 oz).       # Financial/Environmental Concerns: none         Past Medical History    Past Medical History:   Diagnosis Date    Acne rosacea     Anemia 1989    Arthritis     Basal cell carcinoma     Cataract, unspecified cataract type, unspecified laterality     Cervicalgia     Degeneration of cervical intervertebral disc 06/1999    C3-C7    Hearing loss     Hyperlipidemia     Lumbago     Osteoarthrosis     Pain in right shoulder     Parkinson's disease (H)     Resting tremor     Rhinitis, allergic     Right leg weakness     Varicose vein of leg      Medications   Home " Meds  Prior to Admission medications    Medication Sig Start Date End Date Taking? Authorizing Provider   acetaminophen (TYLENOL) 500 MG tablet TAKE TWO TABLETS (1000MG) BY MOUTH TWICE DAILY;AND TAKE TWO TABLETS (1000MG) DAILY AS NEEDED 10/2/24  Yes Sean Singh APRN CNP   apixaban ANTICOAGULANT (ELIQUIS) 5 MG tablet Take 1 tablet (5 mg) by mouth 2 times daily 4/26/24  Yes Sean Singh APRN CNP   calcium polycarbophil (FIBERCON) 625 MG tablet Take 2 tablets by mouth daily   Yes Reported, Patient   carbidopa-levodopa (SINEMET CR)  MG CR tablet TAKE 1 TABLET BY MOUTH EVERY EVENING 9/6/24  Yes Sean Singh APRN CNP   carbidopa-levodopa (SINEMET CR)  MG CR tablet TAKE 1 TABLET BY MOUTH AT BEDTIME 9/6/24  Yes Sean Singh APRN CNP   carbidopa-levodopa (SINEMET)  MG tablet Take 1 tablet by mouth 3 times daily  Patient taking differently: Take 1 tablet by mouth 3 times daily. 0700, 1100, 1500 7/16/24  Yes Tanvi Lindquist MD   donepezil (ARICEPT) 10 MG tablet TAKE 1 TABLET BY MOUTH AT BEDTIME 10/2/24  Yes Sean Singh APRN CNP   entacapone (COMTAN) 200 MG tablet Take 1 tablet (200 mg) by mouth 3 times daily  Patient taking differently: Take 200 mg by mouth 3 times daily. 0700, 1100, 1500 4/29/24  Yes Tanvi Lindquist MD   furosemide (LASIX) 20 MG tablet Take 1 tablet (20 mg) by mouth daily. 9/4/24  Yes Sean Singh APRN CNP   hydrocortisone 2.5 % cream Apply topically every 6 hours as needed for other (hemorrhoids) 5/29/24  Yes Reported, Patient   metroNIDAZOLE (METROGEL) 1 % external gel Apply topically daily. To forehead and face until resolved.  Avoid contact with eyes 10/2/24  Yes Sean Singh APRN CNP   nystatin (MYCOSTATIN) 673495 UNIT/GM external powder Apply topically 2 times daily Apply under breast twice daily for rash flare until healed. Use as directed with any rash flare. Ok to keep bedside. 5/29/24  Yes Sean Singh,  APRN CNP   SENEXON-S 8.6-50 MG tablet TAKE 1 TABLET BY MOUTH AT BEDTIME;AND TAKE ONE TABLET TWICE DAILY AS NEEDED 9/2/24  Yes Sean Singh APRN CNP       Scheduled Meds  Current Facility-Administered Medications   Medication Dose Route Frequency Provider Last Rate Last Admin    acetaminophen (TYLENOL) tablet 1,000 mg  1,000 mg Oral BID Clari Salazar MD   1,000 mg at 10/18/24 0847    calcium polycarbophil (FIBERCON) tablet 1,250 mg  1,250 mg Oral Daily Clari Salazar MD   1,250 mg at 10/18/24 0847    carbidopa-levodopa (SINEMET CR)  MG per CR tablet 1 tablet  1 tablet Oral QPM Clari Salazar MD   1 tablet at 10/17/24 2243    carbidopa-levodopa (SINEMET CR)  MG per CR tablet 1 tablet  1 tablet Oral At Bedtime Clari Salazar MD        carbidopa-levodopa (SINEMET)  MG per tablet 1 tablet  1 tablet Oral TID Clari Salazar MD   1 tablet at 10/18/24 0846    cefTRIAXone (ROCEPHIN) 1 g vial to attach to  mL bag for ADULTS or NS 50 mL bag for PEDS  1 g Intravenous Q24H Clari Salazar MD   1 g at 10/17/24 2151    donepezil (ARICEPT) tablet 10 mg  10 mg Oral At Bedtime Clari Salazar MD   10 mg at 10/18/24 0024    entacapone (COMTAN) tablet 200 mg  200 mg Oral TID Clari Salazar MD   200 mg at 10/18/24 0846    sodium chloride (PF) 0.9% PF flush 3 mL  3 mL Intracatheter Q8H Clari Salazar MD   3 mL at 10/18/24 0024       Infusion Meds  Current Facility-Administered Medications   Medication Dose Route Frequency Provider Last Rate Last Admin       Allergies   Allergies   Allergen Reactions    Other [Seasonal Allergies]      ragweed          PHYSICAL EXAMINATION   Temp:  [97.7  F (36.5  C)-98.8  F (37.1  C)] 98.4  F (36.9  C)  Pulse:  [] 69  Resp:  [15-29] 16  BP: (125-177)/() 125/59  SpO2:  [94 %-99 %] 99 %    General Exam  General:  patient lying in bed without any acute distress    HEENT:  normocephalic/atraumatic  Cardio:  RRR  Pulmonary:  no respiratory distress  Abdomen:   soft  Extremities:  no edema  Skin:  scattered bruises BLE     Neuro Exam  Mental Status:  alert, oriented to self, stated she was in a ballroom, not oriented to situation, correctly states month but not age, follows commands, speech clear.  Cranial Nerves:  suspected homonymous right VF deficits with RLQ>RUQ impairment, facial sensation intact and symmetric, facial movements symmetric, hearing not formally tested but intact to conversation, no dysarthria, shoulder shrug strong bilaterally, tongue protrusion midline  Motor:  normal muscle tone and bulk, no abnormal movements, able to move all limbs spontaneously, no pronator drift  Reflexes:  toes down-going  Sensory:  light touch sensation intact and symmetric throughout upper and lower extremities, no extinction on double simultaneous stimulation   Coordination: no ataxia out of proportion to weakness   Station/Gait:  deferred    Stroke Scales    NIHSS  1a. Level of Consciousness 0-->Alert, keenly responsive   1b. LOC Questions 1-->Answers one question correctly   1c. LOC Commands 0-->Performs both tasks correctly   2.   Best Gaze 0-->Normal   3.   Visual 2-->Complete hemianopia   4.   Facial Palsy 0-->Normal symmetrical movements   5a. Motor Arm, Left 0-->No drift, limb holds 90 (or 45) degrees for full 10 secs   5b. Motor Arm, Right 0-->No drift, limb holds 90 (or 45) degrees for full 10 secs   6a. Motor Leg, Left 0-->No drift, leg holds 30 degree position for full 5 secs   6b. Motor Leg, right 0-->No drift, leg holds 30 degree position for full 5 secs   7.   Limb Ataxia 0-->Absent   8.   Sensory 0-->Normal, no sensory loss   9.   Best Language 0-->No aphasia, normal   10. Dysarthria 0-->Normal   11. Extinction and Inattention  0-->No abnormality   Total 3 (10/18/24 1300)       Imaging  I personally reviewed all imaging; relevant findings per HPI.    Labs Data   CBC  Recent Labs   Lab 10/18/24  0718 10/17/24  1312   WBC 6.3 9.3   RBC 4.18 4.50   HGB 12.2 13.1  "  HCT 38.2 40.9    244     Basic Metabolic Panel   Recent Labs   Lab 10/18/24  0734 10/18/24  0718 10/18/24  0147 10/17/24  2136 10/17/24  1312   NA  --  139  --   --  136   POTASSIUM  --  4.1  --   --  4.1   CHLORIDE  --  102  --   --  99   CO2  --  28  --   --  25   BUN  --  12.7  --   --  12.2   CR  --  0.66  --   --  0.52   * 103* 115*   < > 110*   SHANNON  --  9.6  --   --  9.7    < > = values in this interval not displayed.     Liver Panel  No results for input(s): \"PROTTOTAL\", \"ALBUMIN\", \"BILITOTAL\", \"ALKPHOS\", \"AST\", \"ALT\", \"BILIDIRECT\" in the last 168 hours.  INR    Recent Labs   Lab Test 10/17/24  1312 06/26/24  1156   INR 1.14 1.32*           I have personally spent a total of 35 minutes providing care today, time spent in reviewing medical records and devising the plan as recorded above.    "

## 2024-10-18 NOTE — PHARMACY-ADMISSION MEDICATION HISTORY
Pharmacy Intern Admission Medication History    Admission medication history is complete. The information provided in this note is only as accurate as the sources available at the time of the update.    Information Source(s): Caregiver, Facility (TCU/NH/) medication list/MAR, and CareEverywhere/SureScripts via phone at     Pertinent Information: Verified last doses with pt's caregiver at [phone]    Changes made to PTA medication list:  Added: None  Deleted: None  Changed: None    Allergies reviewed with patient and updates made in EHR: unable to assess    Medication History Completed By: Cirs Sam 10/17/2024 9:13 PM    PTA Med List   Medication Sig Note Last Dose    acetaminophen (TYLENOL) 500 MG tablet TAKE TWO TABLETS (1000MG) BY MOUTH TWICE DAILY;AND TAKE TWO TABLETS (1000MG) DAILY AS NEEDED  10/17/2024 at am    apixaban ANTICOAGULANT (ELIQUIS) 5 MG tablet Take 1 tablet (5 mg) by mouth 2 times daily  10/17/2024 at am    calcium polycarbophil (FIBERCON) 625 MG tablet Take 2 tablets by mouth daily  10/17/2024 at am    carbidopa-levodopa (SINEMET CR)  MG CR tablet TAKE 1 TABLET BY MOUTH EVERY EVENING 10/17/2024: Takes at 1900 10/16/2024 at pm    carbidopa-levodopa (SINEMET CR)  MG CR tablet TAKE 1 TABLET BY MOUTH AT BEDTIME 10/17/2024: Takes at 1900 10/16/2024 at pm    carbidopa-levodopa (SINEMET)  MG tablet Take 1 tablet by mouth 3 times daily (Patient taking differently: Take 1 tablet by mouth 3 times daily. 0700, 1100, 1500)  10/17/2024 at 1100    donepezil (ARICEPT) 10 MG tablet TAKE 1 TABLET BY MOUTH AT BEDTIME  10/16/2024 at pm    entacapone (COMTAN) 200 MG tablet Take 1 tablet (200 mg) by mouth 3 times daily (Patient taking differently: Take 200 mg by mouth 3 times daily. 0700, 1100, 1500)  10/17/2024 at 1100    furosemide (LASIX) 20 MG tablet Take 1 tablet (20 mg) by mouth daily.  10/17/2024 at am    hydrocortisone 2.5 % cream Apply topically every 6 hours as needed for other  (hemorrhoids)   at prn    metroNIDAZOLE (METROGEL) 1 % external gel Apply topically daily. To forehead and face until resolved.  Avoid contact with eyes  10/17/2024 at am    nystatin (MYCOSTATIN) 614680 UNIT/GM external powder Apply topically 2 times daily Apply under breast twice daily for rash flare until healed. Use as directed with any rash flare. Ok to keep bedside.  10/17/2024 at am    SENEXON-S 8.6-50 MG tablet TAKE 1 TABLET BY MOUTH AT BEDTIME;AND TAKE ONE TABLET TWICE DAILY AS NEEDED  10/16/2024 at pm

## 2024-10-18 NOTE — PLAN OF CARE
Pt here with Possible acute/subacute infarct, Fall, Head injury with back of scalp hematoma. A&Ox2, disoriented to time and place. Neuros are stable. VSS. Tele A-Fib. Regular diet, thin liquids. Takes pills whole. Bedrest this shift. Pure wick in place.  Skin with scattered bruising. Denies pain. Pt scoring green on the Aggression Stop Light Tool. Discharge pending.

## 2024-10-18 NOTE — PROGRESS NOTES
10/18/24 5162   Appointment Info   Signing Clinician's Name / Credentials (PT) Cathryn Corbett DPT   Living Environment   People in Home facility resident   Current Living Arrangements   ([facility])   Home Accessibility no concerns   Living Environment Comments Pt unable to provide subjective history.   Self-Care   Activity/Exercise/Self-Care Comment Pt unable to provide subjective history regarding baseline mobility. Attempted to call [facility], unable to get ahold of staff to clarify baseline mobility.   General Information   Onset of Illness/Injury or Date of Surgery 10/17/24   Referring Physician Clari Salazar MD   Patient/Family Therapy Goals Statement (PT) None stated.   Pertinent History of Current Problem (include personal factors and/or comorbidities that impact the POC) 83 y/o female admitted after a fall when transferring from bed-wheelchair. Noted to have large left parieto-occipital scalp hematoma. PMH including Parkinson's disease, atrial fibrillation on chronic anticoagulation with Eliquis, hypertension, dementia .   Existing Precautions/Restrictions fall   General Observations Pt in supine upon arrival of therapist.   Cognition   Affect/Mental Status (Cognition) confused   Orientation Status (Cognition) oriented to;person   Follows Commands (Cognition) physical/tactile prompts required;repetition of directions required;verbal cues/prompting required   Pain Assessment   Patient Currently in Pain No   Integumentary/Edema   Integumentary/Edema Comments Laceration on back of head per chart, not observed d/t hair covering laceration.   Posture    Posture Comments Noted forward head and shoulder posture sitting EOB and standing at Cristy Stedy.   Range of Motion (ROM)   ROM Comment Limited B knee extension, otherwise B LEs WFL.   Strength (Manual Muscle Testing)   Strength Comments Not formally assessed, pt demonstrates B LE weakness with mobility.   Bed Mobility   Comment,  (Bed Mobility) Supine-sit with modA.   Transfers   Comment, (Transfers) Sit <> stand with modA and B UE support.   Gait/Stairs (Locomotion)   Comment, (Gait/Stairs) NT.   Balance   Balance Comments Noted fair sitting balance with forward flexed posture.   Clinical Impression   Criteria for Skilled Therapeutic Intervention Yes, treatment indicated   PT Diagnosis (PT) Difficulty with functional mobility.   Influenced by the following impairments Generalized weakness, Decreased activity tolerance, Impaired balance   Functional limitations due to impairments Limited functional mobility requiring Cristy Stedy and assist of 1.   Clinical Presentation (PT Evaluation Complexity) stable   Clinical Presentation Rationale Based on PMH, current presentation, and social support.   Clinical Decision Making (Complexity) low complexity   Planned Therapy Interventions (PT) balance training;bed mobility training;gait training;strengthening;transfer training   Risk & Benefits of therapy have been explained patient   PT Total Evaluation Time   PT Eval, Low Complexity Minutes (64234) 5   Physical Therapy Goals   PT Frequency 6x/week   PT Predicted Duration/Target Date for Goal Attainment 10/23/24   PT Goals Bed Mobility;Transfers;Gait   PT: Bed Mobility Supervision/stand-by assist;Supine to/from sit   PT: Transfers Minimal assist;Bed to/from chair;Assistive device   Interventions   Interventions Quick Adds Therapeutic Activity   Therapeutic Activity   Therapeutic Activities: dynamic activities to improve functional performance Minutes (83661) 23   Symptoms Noted During/After Treatment Fatigue   Treatment Detail/Skilled Intervention PT: Pt in supine sleeping soundly upon arrival of therapist. Time spent monitoring vitals throughout session, noted hypertension, RN aware and within BP parameters, see VS FS for details. Pt performed supine-sit with modA, use of reposition sheet to scoot hips towards EOB and cues to utilize bed railing to  assist with transfer. Attempted a stand pivot transfer with modA, pt unable to achieve full upright standing posture. Sit <> stand from EOB with Cristy Stedy and Nolvia, cues provided for hand placement and upright posture upon standing. Pt transferred from EOB to recliner with Cristy Stedy and assist of 1. Pt completed an additional stand from lower recliner with Cristy Stedy and min-modA, cues provided for upright posture upon standing. Pt sitting up in chair at end of session, chair alarm on and needs within reach.   PT Discharge Planning   PT Plan Progress transfers with FWW vs Cristy Stedy   PT Discharge Recommendation (DC Rec) home with assist;home with home care physical therapy;Transitional Care Facility   PT Rationale for DC Rec Unclear baseline mobility, unable to reach facility to clarify baseline mobility. [facility] with use of lift device (pt currently utilizing Cristy Stedy) pt could return to U with home PT. If U is unable to provide assist of 1-2 with lift then pt will require TCU at discharge.   PT Brief overview of current status Assist of 1-2 with Cristy Stedy. Goals of therapy will be to address safe mobility and make recs for d/c to next level of care. Pt and RN will continue to follow all falls risk precautions as documented by RN staff while hospitalized   Total Session Time   Timed Code Treatment Minutes 23   Total Session Time (sum of timed and untimed services) 28

## 2024-10-19 ENCOUNTER — APPOINTMENT (OUTPATIENT)
Dept: ULTRASOUND IMAGING | Facility: CLINIC | Age: 84
DRG: 065 | End: 2024-10-19
Attending: NURSE PRACTITIONER
Payer: COMMERCIAL

## 2024-10-19 ENCOUNTER — APPOINTMENT (OUTPATIENT)
Dept: CT IMAGING | Facility: CLINIC | Age: 84
DRG: 065 | End: 2024-10-19
Attending: NURSE PRACTITIONER
Payer: COMMERCIAL

## 2024-10-19 ENCOUNTER — APPOINTMENT (OUTPATIENT)
Dept: PHYSICAL THERAPY | Facility: CLINIC | Age: 84
DRG: 065 | End: 2024-10-19
Payer: COMMERCIAL

## 2024-10-19 LAB
APTT PPP: 61 SECONDS (ref 22–38)
ERYTHROCYTE [DISTWIDTH] IN BLOOD BY AUTOMATED COUNT: 13.6 % (ref 10–15)
GLUCOSE BLDC GLUCOMTR-MCNC: 148 MG/DL (ref 70–99)
HCT VFR BLD AUTO: 38.4 % (ref 35–47)
HGB BLD-MCNC: 12.2 G/DL (ref 11.7–15.7)
HOLD SPECIMEN: NORMAL
MCH RBC QN AUTO: 28.8 PG (ref 26.5–33)
MCHC RBC AUTO-ENTMCNC: 31.8 G/DL (ref 31.5–36.5)
MCV RBC AUTO: 91 FL (ref 78–100)
PLATELET # BLD AUTO: 246 10E3/UL (ref 150–450)
RBC # BLD AUTO: 4.23 10E6/UL (ref 3.8–5.2)
WBC # BLD AUTO: 7.9 10E3/UL (ref 4–11)

## 2024-10-19 PROCEDURE — 120N000001 HC R&B MED SURG/OB

## 2024-10-19 PROCEDURE — 93880 EXTRACRANIAL BILAT STUDY: CPT

## 2024-10-19 PROCEDURE — 250N000013 HC RX MED GY IP 250 OP 250 PS 637: Performed by: NURSE PRACTITIONER

## 2024-10-19 PROCEDURE — 250N000011 HC RX IP 250 OP 636: Performed by: NURSE PRACTITIONER

## 2024-10-19 PROCEDURE — 250N000009 HC RX 250: Performed by: NURSE PRACTITIONER

## 2024-10-19 PROCEDURE — 99233 SBSQ HOSP IP/OBS HIGH 50: CPT | Mod: FS | Performed by: NURSE PRACTITIONER

## 2024-10-19 PROCEDURE — 85730 THROMBOPLASTIN TIME PARTIAL: CPT | Performed by: STUDENT IN AN ORGANIZED HEALTH CARE EDUCATION/TRAINING PROGRAM

## 2024-10-19 PROCEDURE — 85027 COMPLETE CBC AUTOMATED: CPT | Performed by: NURSE PRACTITIONER

## 2024-10-19 PROCEDURE — 250N000011 HC RX IP 250 OP 636: Performed by: INTERNAL MEDICINE

## 2024-10-19 PROCEDURE — 70496 CT ANGIOGRAPHY HEAD: CPT

## 2024-10-19 PROCEDURE — 97530 THERAPEUTIC ACTIVITIES: CPT | Mod: GP

## 2024-10-19 PROCEDURE — 99232 SBSQ HOSP IP/OBS MODERATE 35: CPT | Performed by: STUDENT IN AN ORGANIZED HEALTH CARE EDUCATION/TRAINING PROGRAM

## 2024-10-19 PROCEDURE — 99418 PROLNG IP/OBS E/M EA 15 MIN: CPT | Mod: FS | Performed by: NURSE PRACTITIONER

## 2024-10-19 PROCEDURE — 36415 COLL VENOUS BLD VENIPUNCTURE: CPT | Performed by: NURSE PRACTITIONER

## 2024-10-19 PROCEDURE — 36415 COLL VENOUS BLD VENIPUNCTURE: CPT | Performed by: STUDENT IN AN ORGANIZED HEALTH CARE EDUCATION/TRAINING PROGRAM

## 2024-10-19 PROCEDURE — 250N000013 HC RX MED GY IP 250 OP 250 PS 637: Performed by: INTERNAL MEDICINE

## 2024-10-19 RX ORDER — HEPARIN SODIUM 10000 [USP'U]/100ML
0-5000 INJECTION, SOLUTION INTRAVENOUS CONTINUOUS
Status: DISPENSED | OUTPATIENT
Start: 2024-10-19 | End: 2024-10-21

## 2024-10-19 RX ORDER — ASPIRIN 325 MG
325 TABLET ORAL DAILY
Status: DISCONTINUED | OUTPATIENT
Start: 2024-10-19 | End: 2024-10-19

## 2024-10-19 RX ORDER — ATORVASTATIN CALCIUM 40 MG/1
40 TABLET, FILM COATED ORAL EVERY EVENING
Status: DISCONTINUED | OUTPATIENT
Start: 2024-10-19 | End: 2024-10-21 | Stop reason: HOSPADM

## 2024-10-19 RX ORDER — IOPAMIDOL 755 MG/ML
67 INJECTION, SOLUTION INTRAVASCULAR ONCE
Status: COMPLETED | OUTPATIENT
Start: 2024-10-19 | End: 2024-10-19

## 2024-10-19 RX ORDER — HYDRALAZINE HYDROCHLORIDE 20 MG/ML
10 INJECTION INTRAMUSCULAR; INTRAVENOUS EVERY 6 HOURS PRN
Status: DISCONTINUED | OUTPATIENT
Start: 2024-10-19 | End: 2024-10-21 | Stop reason: HOSPADM

## 2024-10-19 RX ADMIN — CARBIDOPA AND LEVODOPA 1 TABLET: 25; 250 TABLET ORAL at 19:03

## 2024-10-19 RX ADMIN — CALCIUM POLYCARBOPHIL 1250 MG: 625 TABLET, FILM COATED ORAL at 08:59

## 2024-10-19 RX ADMIN — HYDRALAZINE HYDROCHLORIDE 10 MG: 20 INJECTION INTRAMUSCULAR; INTRAVENOUS at 20:22

## 2024-10-19 RX ADMIN — ACETAMINOPHEN 1000 MG: 500 TABLET ORAL at 08:58

## 2024-10-19 RX ADMIN — DONEPEZIL HYDROCHLORIDE 10 MG: 10 TABLET ORAL at 21:48

## 2024-10-19 RX ADMIN — SODIUM CHLORIDE 100 ML: 9 INJECTION, SOLUTION INTRAVENOUS at 11:20

## 2024-10-19 RX ADMIN — CARBIDOPA AND LEVODOPA 1 TABLET: 25; 250 TABLET ORAL at 08:59

## 2024-10-19 RX ADMIN — ACETAMINOPHEN 1000 MG: 500 TABLET ORAL at 20:01

## 2024-10-19 RX ADMIN — ENTACAPONE 200 MG: 200 TABLET, FILM COATED ORAL at 14:00

## 2024-10-19 RX ADMIN — IOPAMIDOL 67 ML: 755 INJECTION, SOLUTION INTRAVENOUS at 11:20

## 2024-10-19 RX ADMIN — ATORVASTATIN CALCIUM 40 MG: 40 TABLET, FILM COATED ORAL at 19:58

## 2024-10-19 RX ADMIN — HEPARIN SODIUM 900 UNITS/HR: 10000 INJECTION, SOLUTION INTRAVENOUS at 14:19

## 2024-10-19 RX ADMIN — ENTACAPONE 200 MG: 200 TABLET, FILM COATED ORAL at 08:59

## 2024-10-19 RX ADMIN — CARBIDOPA AND LEVODOPA 1 TABLET: 25; 100 TABLET, EXTENDED RELEASE ORAL at 19:59

## 2024-10-19 RX ADMIN — CEFTRIAXONE SODIUM 1 G: 1 INJECTION, POWDER, FOR SOLUTION INTRAMUSCULAR; INTRAVENOUS at 20:08

## 2024-10-19 RX ADMIN — CARBIDOPA AND LEVODOPA 1 TABLET: 25; 250 TABLET ORAL at 14:00

## 2024-10-19 RX ADMIN — ENTACAPONE 200 MG: 200 TABLET, FILM COATED ORAL at 19:03

## 2024-10-19 RX ADMIN — CARBIDOPA AND LEVODOPA 1 TABLET: 50; 200 TABLET, EXTENDED RELEASE ORAL at 19:58

## 2024-10-19 ASSESSMENT — ACTIVITIES OF DAILY LIVING (ADL)
ADLS_ACUITY_SCORE: 53
ADLS_ACUITY_SCORE: 57
ADLS_ACUITY_SCORE: 51
ADLS_ACUITY_SCORE: 51
ADLS_ACUITY_SCORE: 53
ADLS_ACUITY_SCORE: 52
ADLS_ACUITY_SCORE: 53
ADLS_ACUITY_SCORE: 55
ADLS_ACUITY_SCORE: 53
ADLS_ACUITY_SCORE: 57
ADLS_ACUITY_SCORE: 52
ADLS_ACUITY_SCORE: 57
ADLS_ACUITY_SCORE: 51
ADLS_ACUITY_SCORE: 53
ADLS_ACUITY_SCORE: 57
ADLS_ACUITY_SCORE: 57
ADLS_ACUITY_SCORE: 55
ADLS_ACUITY_SCORE: 52
ADLS_ACUITY_SCORE: 51
ADLS_ACUITY_SCORE: 53
ADLS_ACUITY_SCORE: 51
ADLS_ACUITY_SCORE: 53
ADLS_ACUITY_SCORE: 53

## 2024-10-19 NOTE — PROVIDER NOTIFICATION
Call received from [facility] at 6:06pm. Per provider, they attempted to get ahold of stroke neurology and were unsuccessful. Recent imaging showing an acute L ICA occlusion. Writer paged Dr. Garcia and informed him of findings. Message received back that he would review. Per Dr. Garcia - no new orders at this time, continue heparin gtt and monitor clinically.

## 2024-10-19 NOTE — PLAN OF CARE
Reason for Admission: possible acute/subacute infarct, fall with scalp hematoma    Cognitive/Mentation: A/Ox 2 (disoriented to situation and time)  Neuros/CMS: Intact ex BUE 5/5, BLE 4/5, intermittently follows commands, R neglect/field cut  VS: stable on RA. SBP<220  Tele: NSR  /GI: Incontinent. Last BM PTA.   Pulmonary: LS clear.  Pain: denies.     Drains/Lines: PIV SL  Skin: swelling/hematoma posterior scalp from fall MARGARETH, scattered bruising, dry/flaky skin  Activity: Assist x 2 with SS.  Diet: combination/soft bite sized with thin liquids. Takes pills crushed in pudding.     Therapies recs: home with home care, home with home care PT, TCU  Discharge: pending    Aggression Stoplight Tool: yellow - confusion, frustration, and repeatedly trying to get out of bed.      End of shift summary: Pt attempted to get out of bed repeatedly throughout shift.  Became slightly agitated when redirected to go back to bed.   Pt has hx of dementia.  PTA Eliquis being held due to scalp hematoma

## 2024-10-19 NOTE — PROGRESS NOTES
"      [facility]    Vascular Neurology Progress Note    Interval History     Today she is more conversational and is asking questions more appropriately. When we informed her about her stroke diagnosis, she asked, \"how do we fix that?\".    I spoke with Mitesh this afternoon. We reviewed imaging, stroke etiology, and plan for secondary stroke prevention. They shared that at memory care, Jennifer utilizes a walker and needs assistance with all ADLs. We discussed medical and surgical management of carotid stenosis and they confirmed that surgery would not be within goals of care.  Family would like to request that when she returns to [place], she be placed on a toileting schedule as she is often unaware of needing to use the bathroom and may not ask for assistance when needed.     Hospital Course     Chief complaint: Fall, Head Injury, and Neck Pain    84 year old with PMH of Parkinson's disease, Atrial fibrillation on Eliquis, and dementia. CTH was concerning for left occipital hypodensity, MRI brain showed scattered left hemisphere infarcts. She resides in memory care and is unable to given additional details surrounding her fall.   10/17 - Eliquis held given scalp hematoma  10/19 - low intensity heparin drip started     Assessment and Plan     1. Acute ischemic stroke of left hemisphere watershed territory and left temporo-occipital junction due to large-artery atherosclerosis in the setting of >90% proximal L ICA stenosis. Vessel imaging also demonstrates multifocal intra and extracranial atherosclerotic disease.     - Given  mg x1 this morning, now discontinued. Started on low intensity heparin/no boluses.   --- Monitor scalp hematoma for expansion while anticoagulated  - , started on atorvastatin 40 mg daily with goal LDL 40-70  - A1c 6.0, within goal <7.0  - Inpatient goal SBP <180. Long term goal BP <140/90 with tighter control associated with decreased overall CV " risk, if tolerated  - PT/OT/SLP  - Stroke education    Pending Evaluation  - CUS  - Repeat CTH when heparin is therapeutic  - Repeat CTA in coming days to evaluate for improvement in L ICA    DVT Prophylaxis: heparin drip    Patient Follow-up    - final recommendation pending work-up    We will continue to follow.     Wilber Bonilla NP Student    I, Daisy Castellanos CNP, was present with the medical/KENTRELL student who participated in the service and in the documentation of the note.  I have verified the history and personally performed the physical exam and medical decision making.  I agree with the assessment and plan of care as documented in the note.       Daisy Castellanos CNP      Physical Examination     Temp: 98.8  F (37.1  C) Temp src: Axillary BP: (!) 175/108 Pulse: 71   Resp: 16 SpO2: 98 % O2 Device: None (Room air)      Neurologic  Mental Status:  more alert and conversational today, follows commands, speech clear.  Cranial Nerves:  homonymous R lower quadrantanopia (some RUQ impairment but no dense field cut), facial sensation intact and symmetric, mild right facial weakness, hearing not formally tested but intact to conversation, no dysarthria, shoulder shrug strong bilaterally, tongue protrusion midline  Motor:  normal muscle tone and bulk, no abnormal movements, able to move all limbs spontaneously, mild drift in RUE  Reflexes:  toes down-going  Sensory:  light touch sensation intact and symmetric throughout upper and lower extremities, no extinction on double simultaneous stimulation   Coordination: no ataxia out of proportion to weakness   Station/Gait:  deferred    Stroke Scales       NIHSS  1a. Level of Consciousness 0-->Alert, keenly responsive   1b. LOC Questions 1-->Answers one question correctly   1c. LOC Commands 0-->Performs both tasks correctly   2.   Best Gaze 0-->Normal   3.   Visual 1-->Partial hemianopia   4.   Facial Palsy 1-->Minor paralysis (flattened nasolabial fold, asymmetry on smiling)    5a. Motor Arm, Left 0-->No drift, limb holds 90 (or 45) degrees for full 10 secs   5b. Motor Arm, Right 1-->Drift, limb holds 90 (or 45) degrees, but drifts down before full 10 secs, does not hit bed or other support   6a. Motor Leg, Left 0-->No drift, leg holds 30 degree position for full 5 secs   6b. Motor Leg, right 0-->No drift, leg holds 30 degree position for full 5 secs   7.   Limb Ataxia 0-->Absent   8.   Sensory 0-->Normal, no sensory loss   9.   Best Language 0-->No aphasia, normal   10. Dysarthria 0-->Normal   11. Extinction and Inattention  0-->No abnormality   Total 4 (10/19/24 1200)       Imaging/Labs   (Bolded imaging and labs new and/or personally reviewed or re-reviewed by me today)    MRI/Head CT MRI brain: Moderate acute/early subacute infarction left temporal occipital region extending into left parietal lobe.   Additional multiple small patchy and punctate foci of acute/early subacute infarction in left frontal and parietal lobe. Small amount of associated the petechial hemorrhage in the left temporal occipital region.    Intracranial Vasculature CTA:   1.  Retrograde filling in the R V4 segment.  2.  Focal moderate stenosis in the R A2 segment.  3.  Moderate to severe focal stenosis in the L P1 segment, moderate stenosis beyond P2 segment.  4.  Multifocal mild stenosis in the anterior and posterior circulation   Cervical Vasculature Greater than 90% stenosis of the left ICA bifurcation secondary to hypodense, likely ulcerated plaque.  2.  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      Echocardiogram EF 60-65%, moderate concentric LVH, normal LA   EKG/Telemetry Sinus Rhythm   Other Testing CT CAP: no evidence of acute trauma, noted to have hypo attenuation in the left ventricular apex concerning for old infarct vs thrombus.       LDL  10/17/2024: 111 mg/dL   A1C  10/17/2024: 6.0 %   Troponin 10/17/2024: 66 ng/L          Time Spent on  this Encounter   Billing: I have personally spent a total of 35 minutes providing care today, time spent in reviewing medical records and devising the plan as recorded above.

## 2024-10-19 NOTE — PLAN OF CARE
Reason for Admission:  fall, L CVA, L ICA stenosis    Cognitive/Mentation: A/Ox 2-3, disoriented to time and situation  Neuros/CMS: confused, R field cut, RUE drift, unable to do finger to nose due to field cut, unable to lift BLE, unable to do heel to shin, weak plantarflexion. Generalized weakness all extremities 4/5  VS: VSS on RA.   Tele: afib CVR.  : Incontinent, purewick in place  GI: Incontinent.  Pulmonary: LS clear.  Pain: denies, on scheduled Tylenol.     Drains/Lines: new PIV started, heparin infusing at 900 units/hr, PTT check at 2030  Skin: large lump to back of head, scattered bruising, large bruise to L inner thigh  Activity: Assist x 2 with lift.  Diet: soft and bite sized with thin liquids. Takes pills crushed in pudding.     Therapies recs: TCU  Discharge: pending    Aggression Stoplight Tool: green    End of shift summary: plan for repeat heat CT once heparin therapeutic.

## 2024-10-19 NOTE — PROGRESS NOTES
[facility]    Medicine Progress Note - Hospitalist Service    Date of Admission:  10/17/2024    Assessment & Plan   Jennifer Venegas is a 84 year old female with history of Parkinson's disease, atrial fibrillation on chronic anticoagulation with Eliquis, hypertension, dementia who presented on 101/7 with head injury and neck pain after a fall.    Acute ischemic stroke of left hemisphere watershed territory and left temporo-occipital junction due to large-artery atherosclerosis in the setting of >90% proximal L ICA stenosis.   Multifocal intra and extracranial atherosclerotic disease.   Unwitnessed fall  Scalp hematoma   Pt is a poor historian 2/2 underlying dementia. Presented to the emergency department after an unwitnessed fall at her memory care facility.   * CT head obtained in the ED showed large left parietal occipital scalp hematoma.  It also shows hypodensity and loss of gray-white differentiation in the left occipital lobe concerning for acute/subacute infarct  * Brain MRI obtained and showed Moderate acute/early subacute infarction left temporal occipital region extending into left parietal lobe.   Additional multiple small patchy and punctate foci of acute/early subacute infarction in left frontal and parietal lobe. Small amount of associated the petechial hemorrhage in the left temporal occipital region. There is also intra and extracranial atherosclerotic disease including greater than 90% stenosis of the left ICA bifurcation secondary to hypodense, likely ulcerated plaque.   - Admit to inpatient   - Every 4 neurochecks  - Stroke neurology consulted  - Given findings of severe ICA stenosis and possible ulcerated plaque, stroke neuro recommending heparin drip   - Planning for repeat CTA in the coming days to evaluate for improvement in L ICA   - Unclear if pt would be candidate for CEA   - Atorvastatin 40mg daily   - Monitor scalp hematoma while on heparin  - Fall precaution  -  "A1c 6.0, within goal <7.0  - Inpatient goal SBP <180. Long term goal BP <140/90 with tighter control associated with decreased overall CV risk, if tolerated  - PT OT speech evaluation and treatment  - Family would like patient discharged back to memory care, not TCU    Urinary tract infection   UA noted to be abnormal on admission. Urine culture now growing gram negative bacilli  - Continue Ceftriaxone   - Follow culture    History of atrial fibrillation on chronic anticoagulation with Eliquis  -Holding PTA Eliquis as above given scalp hematoma, appropriate timing of resumption as guided by stroke neurology  - Starting on heparin as noted above     Hypertension  Dyslipidemia  - Holding PTA Lasix to allow permissive hypertension  - Not on statin PTA     History of Parkinson's disease  History of dementia  Resides in memory care at [facility].   - Resume PTA Sinemet, Comtan, Aricept         Diet: Combination Diet Soft and Bite Sized Diet (level 6); Thin Liquids (level 0) (direct assist (set up, may need hand over hand but allow to self-feed), slow rate, single bites/sips, liquid wash PRN)    DVT Prophylaxis: Pneumatic Compression Devices  [place] Catheter: Not present  Lines: None     Cardiac Monitoring: ACTIVE order. Indication: Stroke, acute (48 hours)  Code Status: No CPR- Do NOT Intubate      Clinically Significant Risk Factors                   # Hypertension: Noted on problem list    # Dementia: noted on problem list        # Overweight: Estimated body mass index is 28.73 kg/m  as calculated from the following:    Height as of 10/2/24: 1.651 m (5' 5\").    Weight as of this encounter: 78.3 kg (172 lb 9.9 oz)., PRESENT ON ADMISSION       # Financial/Environmental Concerns: none         Disposition Plan     Medically Ready for Discharge: Anticipated Tomorrow      Dayan Olivas MD  Hospitalist Service  [facility]  Securely message with [employer] (more info)  Text page via [employer] " Lara/Yoandy   ______________________________________________________________________    Interval History   NAEO. Pt does not provide any accurate history. Thinks she is in a hotel room. Denies pain or other concerns today.     Physical Exam   Vital Signs: Temp: 98.1  F (36.7  C) Temp src: Axillary BP: (!) 141/79 Pulse: 80   Resp: 16 SpO2: 97 % O2 Device: None (Room air)    Weight: 172 lbs 9.92 oz    Constitutional: Awake, alert, cooperative, no apparent distress.  Eyes: Conjunctiva and pupils examined and normal.  HEENT: Moist mucous membranes, normal dentition.  Respiratory: Clear to auscultation bilaterally, no crackles or wheezing.  Cardiovascular: Regular rate and rhythm, normal S1 and S2, and no murmur noted.  GI: Soft, non-distended, non-tender, normal bowel sounds.  Skin: No rashes, no cyanosis, no edema.  Musculoskeletal: No joint swelling, erythema or tenderness.  Neurologic: Cranial nerves 2-12 intact, normal strength and sensation.  Psychiatric: Alert, oriented to person only, no obvious anxiety or depression.      Medical Decision Making       60 MINUTES SPENT BY ME on the date of service doing chart review, history, exam, documentation & further activities per the note.      Data     I have personally reviewed the following data over the past 24 hrs:    7.9  \   12.2   / 246     N/A N/A N/A /  158 (H)   N/A N/A N/A \       Imaging results reviewed over the past 24 hrs:   Recent Results (from the past 24 hour(s))   MR Brain w/o & w Contrast    Narrative    EXAM: MRI head without and with contrast, MRA head without contrast, MRA neck without and with contrast.     LOCATION: [facility]  DATE: 10/18/2024    INDICATION: Acute infarction  COMPARISON: CT head 10/17/2024  CONTRAST: 10 mL Gadavist  TECHNIQUE:   1) Routine multiplanar multisequence head MRI without and with intravenous contrast.  2) 3D time-of-flight head MRA without intravenous contrast.  3) Neck MRA without and  with IV contrast. Stenosis measurements made according to NASCET criteria unless otherwise specified.    FINDINGS:  HEAD MRI:  INTRACRANIAL CONTENTS: Moderate acute/early subacute infarction left temporal occipital region extending into left parietal lobe. Additional multiple small patchy and punctate foci of acute/early subacute infarction in left frontal and parietal lobe.   Small amount of associated the petechial hemorrhage in the left temporal occipital region. No significant mass effect. Patchy and confluent nonspecific T2/FLAIR hyperintensities within the cerebral white matter most consistent with moderate chronic   microvascular ischemic change. Moderate generalized cerebral atrophy. No hydrocephalus. Normal position of the cerebellar tonsils. Small areas of patchy enhancement in the left temporal occipital region of ischemia; consistent with more subacute stage.    SELLA: No abnormality accounting for technique.    OSSEOUS STRUCTURES/SOFT TISSUES: Normal marrow signal. Asymmetrically diminished flow void in left petrous and cavernous ICA.     ORBITS: No abnormality accounting for technique.     SINUSES/MASTOIDS: No paranasal sinus mucosal disease. No middle ear or mastoid effusion.     HEAD MRA:   ANTERIOR CIRCULATION: Nonvisualization of petrous and cavernous ICA. The left ICA is reconstituted in the distal supraclinoid segment. The left M1 segment is asymmetrically less opacified compared with the right side. Moderate to marked narrowing left   A1. Mild to moderate areas of narrowing right A1. Short segment marked narrowing mid to distal right A2. Additional mild to moderate areas of narrowings throughout anterior circulation including short segment mild to moderate narrowing at the proximal   aspect of right M2. Fetal origin right posterior cerebral artery.    POSTERIOR CIRCULATION: Mild to moderate narrowing proximal and mid right posterior communicating artery. Marked narrowing distal right P1. Short  segment moderate to marked narrowing proximal right P2. Marked narrowing to occlusion of proximal left P2.   Paucity of more distal branches of left posterior circulation. Dominant left vertebral artery with moderate narrowing proximal basilar artery.     NECK MRA:   RIGHT CAROTID: Less than 50% narrowing.    LEFT CAROTID: Short segment marked narrowing proximal left ICA, just distal to bifurcation; narrowing is measured at over 90%. Mildly diminished flow in more distal left ICA compared with the right side.    VERTEBRAL ARTERIES: Short segment mild narrowing distal left vertebral artery. Smaller right vertebral artery is only segmentally visualized with high T1 signal in its proximal and mid aspect; this could be due to a dissection. Dominant left and smaller   right vertebral arteries.    AORTIC ARCH: Classic aortic arch anatomy with no significant stenosis at the origin of the great vessels.      Impression    IMPRESSION:  HEAD MRI:   1.  Moderate acute/early subacute infarction left temporal occipital region extending into left parietal lobe.   2.  Additional multiple small patchy and punctate foci of acute/early subacute infarction in left frontal and parietal lobe.   3.  Small amount of associated the petechial hemorrhage in the left temporal occipital region.   4.  No significant mass effect.   5.  Moderate chronic small vessel ischemia.   6.  Moderate generalized cerebral atrophy. No hydrocephalus.   7.  Small areas of patchy enhancement in the left temporal occipital region of ischemia; consistent with more subacute stage.    HEAD MRA:   1.  Nonvisualization of petrous and cavernous ICA. The left ICA is reconstituted in the distal supraclinoid segment.   2.  The left M1 segment is asymmetrically less opacified compared with the right side.   3.  Moderate to marked narrowing left A1.   4.  Mild to moderate areas of narrowing right A1.   5.  Short segment marked narrowing mid to distal right A2.   6.   Additional mild to moderate areas of narrowings throughout anterior circulation including short segment mild to moderate narrowing at the proximal aspect of right M2.  7.  Mild to moderate narrowing proximal and mid right posterior communicating artery.   8.  Marked narrowing distal right P1.   9.  Short segment moderate to marked narrowing proximal right P2.   10.  Marked narrowing to occlusion of proximal left P2.   11.  Paucity of more distal branches of left posterior circulation.    NECK MRA:  1.  Short segment marked narrowing proximal left ICA, just distal to bifurcation; narrowing is measured at over 90%.   2.  Mildly diminished flow in more distal left ICA compared with the right side.  3.  No hemodynamically significant narrowing proximal right ICA.  4.  Short segment mild narrowing distal left vertebral artery. Smaller right vertebral artery is only segmentally visualized with high T1 signal in its proximal and mid aspect; this could be due to a dissection.   MRA Neck (Carotids) wo & w Contrast    Narrative    EXAM: MRI head without and with contrast, MRA head without contrast, MRA neck without and with contrast.     LOC[facility]: [facility]  DATE: 10/18/2024    INDICATION: Acute infarction  COMPARISON: CT head 10/17/2024  CONTRAST: 10 mL Gadavist  TECHNIQUE:   1) Routine multiplanar multisequence head MRI without and with intravenous contrast.  2) 3D time-of-flight head MRA without intravenous contrast.  3) Neck MRA without and with IV contrast. Stenosis measurements made according to NASCET criteria unless otherwise specified.    FINDINGS:  HEAD MRI:  INTRACRANIAL CONTENTS: Moderate acute/early subacute infarction left temporal occipital region extending into left parietal lobe. Additional multiple small patchy and punctate foci of acute/early subacute infarction in left frontal and parietal lobe.   Small amount of associated the petechial hemorrhage in the left temporal occipital  region. No significant mass effect. Patchy and confluent nonspecific T2/FLAIR hyperintensities within the cerebral white matter most consistent with moderate chronic   microvascular ischemic change. Moderate generalized cerebral atrophy. No hydrocephalus. Normal position of the cerebellar tonsils. Small areas of patchy enhancement in the left temporal occipital region of ischemia; consistent with more subacute stage.    SELLA: No abnormality accounting for technique.    OSSEOUS STRUCTURES/SOFT TISSUES: Normal marrow signal. Asymmetrically diminished flow void in left petrous and cavernous ICA.     ORBITS: No abnormality accounting for technique.     SINUSES/MASTOIDS: No paranasal sinus mucosal disease. No middle ear or mastoid effusion.     HEAD MRA:   ANTERIOR CIRCULATION: Nonvisualization of petrous and cavernous ICA. The left ICA is reconstituted in the distal supraclinoid segment. The left M1 segment is asymmetrically less opacified compared with the right side. Moderate to marked narrowing left   A1. Mild to moderate areas of narrowing right A1. Short segment marked narrowing mid to distal right A2. Additional mild to moderate areas of narrowings throughout anterior circulation including short segment mild to moderate narrowing at the proximal   aspect of right M2. Fetal origin right posterior cerebral artery.    POSTERIOR CIRCULATION: Mild to moderate narrowing proximal and mid right posterior communicating artery. Marked narrowing distal right P1. Short segment moderate to marked narrowing proximal right P2. Marked narrowing to occlusion of proximal left P2.   Paucity of more distal branches of left posterior circulation. Dominant left vertebral artery with moderate narrowing proximal basilar artery.     NECK MRA:   RIGHT CAROTID: Less than 50% narrowing.    LEFT CAROTID: Short segment marked narrowing proximal left ICA, just distal to bifurcation; narrowing is measured at over 90%. Mildly diminished flow in  more distal left ICA compared with the right side.    VERTEBRAL ARTERIES: Short segment mild narrowing distal left vertebral artery. Smaller right vertebral artery is only segmentally visualized with high T1 signal in its proximal and mid aspect; this could be due to a dissection. Dominant left and smaller   right vertebral arteries.    AORTIC ARCH: Classic aortic arch anatomy with no significant stenosis at the origin of the great vessels.      Impression    IMPRESSION:  HEAD MRI:   1.  Moderate acute/early subacute infarction left temporal occipital region extending into left parietal lobe.   2.  Additional multiple small patchy and punctate foci of acute/early subacute infarction in left frontal and parietal lobe.   3.  Small amount of associated the petechial hemorrhage in the left temporal occipital region.   4.  No significant mass effect.   5.  Moderate chronic small vessel ischemia.   6.  Moderate generalized cerebral atrophy. No hydrocephalus.   7.  Small areas of patchy enhancement in the left temporal occipital region of ischemia; consistent with more subacute stage.    HEAD MRA:   1.  Nonvisualization of petrous and cavernous ICA. The left ICA is reconstituted in the distal supraclinoid segment.   2.  The left M1 segment is asymmetrically less opacified compared with the right side.   3.  Moderate to marked narrowing left A1.   4.  Mild to moderate areas of narrowing right A1.   5.  Short segment marked narrowing mid to distal right A2.   6.  Additional mild to moderate areas of narrowings throughout anterior circulation including short segment mild to moderate narrowing at the proximal aspect of right M2.  7.  Mild to moderate narrowing proximal and mid right posterior communicating artery.   8.  Marked narrowing distal right P1.   9.  Short segment moderate to marked narrowing proximal right P2.   10.  Marked narrowing to occlusion of proximal left P2.   11.  Paucity of more distal branches of left  posterior circulation.    NECK MRA:  1.  Short segment marked narrowing proximal left ICA, just distal to bifurcation; narrowing is measured at over 90%.   2.  Mildly diminished flow in more distal left ICA compared with the right side.  3.  No hemodynamically significant narrowing proximal right ICA.  4.  Short segment mild narrowing distal left vertebral artery. Smaller right vertebral artery is only segmentally visualized with high T1 signal in its proximal and mid aspect; this could be due to a dissection.   MRA Brain ([place]) wo Contrast    Narrative    EXAM: MRI head without and with contrast, MRA head without contrast, MRA neck without and with contrast.     LOCATION: [facility]  DATE: 10/18/2024    INDICATION: Acute infarction  COMPARISON: CT head 10/17/2024  CONTRAST: 10 mL Gadavist  TECHNIQUE:   1) Routine multiplanar multisequence head MRI without and with intravenous contrast.  2) 3D time-of-flight head MRA without intravenous contrast.  3) Neck MRA without and with IV contrast. Stenosis measurements made according to NASCET criteria unless otherwise specified.    FINDINGS:  HEAD MRI:  INTRACRANIAL CONTENTS: Moderate acute/early subacute infarction left temporal occipital region extending into left parietal lobe. Additional multiple small patchy and punctate foci of acute/early subacute infarction in left frontal and parietal lobe.   Small amount of associated the petechial hemorrhage in the left temporal occipital region. No significant mass effect. Patchy and confluent nonspecific T2/FLAIR hyperintensities within the cerebral white matter most consistent with moderate chronic   microvascular ischemic change. Moderate generalized cerebral atrophy. No hydrocephalus. Normal position of the cerebellar tonsils. Small areas of patchy enhancement in the left temporal occipital region of ischemia; consistent with more subacute stage.    SELLA: No abnormality accounting for  technique.    OSSEOUS STRUCTURES/SOFT TISSUES: Normal marrow signal. Asymmetrically diminished flow void in left petrous and cavernous ICA.     ORBITS: No abnormality accounting for technique.     SINUSES/MASTOIDS: No paranasal sinus mucosal disease. No middle ear or mastoid effusion.     HEAD MRA:   ANTERIOR CIRCULATION: Nonvisualization of petrous and cavernous ICA. The left ICA is reconstituted in the distal supraclinoid segment. The left M1 segment is asymmetrically less opacified compared with the right side. Moderate to marked narrowing left   A1. Mild to moderate areas of narrowing right A1. Short segment marked narrowing mid to distal right A2. Additional mild to moderate areas of narrowings throughout anterior circulation including short segment mild to moderate narrowing at the proximal   aspect of right M2. Fetal origin right posterior cerebral artery.    POSTERIOR CIRCULATION: Mild to moderate narrowing proximal and mid right posterior communicating artery. Marked narrowing distal right P1. Short segment moderate to marked narrowing proximal right P2. Marked narrowing to occlusion of proximal left P2.   Paucity of more distal branches of left posterior circulation. Dominant left vertebral artery with moderate narrowing proximal basilar artery.     NECK MRA:   RIGHT CAROTID: Less than 50% narrowing.    LEFT CAROTID: Short segment marked narrowing proximal left ICA, just distal to bifurcation; narrowing is measured at over 90%. Mildly diminished flow in more distal left ICA compared with the right side.    VERTEBRAL ARTERIES: Short segment mild narrowing distal left vertebral artery. Smaller right vertebral artery is only segmentally visualized with high T1 signal in its proximal and mid aspect; this could be due to a dissection. Dominant left and smaller   right vertebral arteries.    AORTIC ARCH: Classic aortic arch anatomy with no significant stenosis at the origin of the great vessels.      Impression     IMPRESSION:  HEAD MRI:   1.  Moderate acute/early subacute infarction left temporal occipital region extending into left parietal lobe.   2.  Additional multiple small patchy and punctate foci of acute/early subacute infarction in left frontal and parietal lobe.   3.  Small amount of associated the petechial hemorrhage in the left temporal occipital region.   4.  No significant mass effect.   5.  Moderate chronic small vessel ischemia.   6.  Moderate generalized cerebral atrophy. No hydrocephalus.   7.  Small areas of patchy enhancement in the left temporal occipital region of ischemia; consistent with more subacute stage.    HEAD MRA:   1.  Nonvisualization of petrous and cavernous ICA. The left ICA is reconstituted in the distal supraclinoid segment.   2.  The left M1 segment is asymmetrically less opacified compared with the right side.   3.  Moderate to marked narrowing left A1.   4.  Mild to moderate areas of narrowing right A1.   5.  Short segment marked narrowing mid to distal right A2.   6.  Additional mild to moderate areas of narrowings throughout anterior circulation including short segment mild to moderate narrowing at the proximal aspect of right M2.  7.  Mild to moderate narrowing proximal and mid right posterior communicating artery.   8.  Marked narrowing distal right P1.   9.  Short segment moderate to marked narrowing proximal right P2.   10.  Marked narrowing to occlusion of proximal left P2.   11.  Paucity of more distal branches of left posterior circulation.    NECK MRA:  1.  Short segment marked narrowing proximal left ICA, just distal to bifurcation; narrowing is measured at over 90%.   2.  Mildly diminished flow in more distal left ICA compared with the right side.  3.  No hemodynamically significant narrowing proximal right ICA.  4.  Short segment mild narrowing distal left vertebral artery. Smaller right vertebral artery is only segmentally visualized with high T1 signal in its proximal  and mid aspect; this could be due to a dissection.   CTA Head Neck with Contrast    Narrative    EXAM: CTA HEAD NECK W CONTRAST  LOCATION: [facility]  DATE: 10/19/2024    INDICATION: furthere evaluate multifocal stenosis on MRA  COMPARISON: MRA from yesterday.  CONTRAST: 67mL Isovue 370  TECHNIQUE: Head and neck CT angiogram with IV contrast. Noncontrast head CT followed by axial helical CT images of the head and neck vessels obtained during the arterial phase of intravenous contrast administration. Axial 2D reconstructed images and   multiplanar 3D MIP reconstructed images of the head and neck vessels were performed by the technologist. Dose reduction techniques were used. All stenosis measurements made according to NASCET criteria unless otherwise specified.    FINDINGS:   HEAD CTA:  ANTERIOR CIRCULATION: Patent bilateral ICAs. Calcified atherosclerotic plaques in bilateral carotid siphons bones with mild stenosis in bilateral clinoid ICAs. Multifocal mild stenosis in the M1 segment of the right MCA. Multifocal mild stenosis in the   A1 segment and focal moderate stenosis in the A2 segment of the right EDWIGE. No stenosis/occlusion, aneurysm, or high flow vascular malformation. Standard [place] anatomy.    POSTERIOR CIRCULATION: Moderate to severe focal stenosis in the P1 segment of the left PCA with diffuse moderate to severe stenosis beyond P2 segment. Multifocal mild stenosis in the right PCA. Multifocal mild stenosis in the basilar artery. Retrograde   attenuated contrast filling in the V4 segment of the right vertebral artery.     DURAL VENOUS SINUSES: Expected enhancement of the major dural venous sinuses.    NECK CTA:  RIGHT CAROTID: Calcified atherosclerotic plaques at the bifurcation without significant stenosis.    LEFT CAROTID: Greater than 90% stenosis of the right proximal ICA at the bifurcation secondary to likely ulcerated plaque.    VERTEBRAL ARTERIES: Occlusion of  the right vertebral artery from the origin with attenuated contrast filling in the distal V2, V3 and V4 segment. Balanced vertebral arteries.    AORTIC ARCH: Classic aortic arch anatomy with no significant stenosis at the origin of the great vessels.    NONVASCULAR STRUCTURES: Unremarkable.      Impression    IMPRESSION:   HEAD CTA:   1.  Retrograde contrast filling in the V4 segment of the right vertebral artery. The remaining major intracranial arteries are patent.  2.   Focal moderate stenosis in the A2 segment of the right EDWIGE.  3.  Moderate to severe focal stenosis in the P1 segment of the left PCA with diffuse moderate stenosis beyond P2 segment.  4.  Multifocal mild stenosis in the anterior and posterior circulation as detailed above.    NECK CTA:  1.  Greater than 90% stenosis of the left ICA at the bifurcation secondary to hypodense, likely ulcerated plaque.  2.  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      Helical Rim Text: The closure involved the helical rim.

## 2024-10-20 ENCOUNTER — APPOINTMENT (OUTPATIENT)
Dept: SPEECH THERAPY | Facility: CLINIC | Age: 84
DRG: 065 | End: 2024-10-20
Payer: COMMERCIAL

## 2024-10-20 ENCOUNTER — APPOINTMENT (OUTPATIENT)
Dept: CT IMAGING | Facility: CLINIC | Age: 84
DRG: 065 | End: 2024-10-20
Attending: NURSE PRACTITIONER
Payer: COMMERCIAL

## 2024-10-20 LAB
APTT PPP: 50 SECONDS (ref 22–38)
APTT PPP: 67 SECONDS (ref 22–38)
APTT PPP: 82 SECONDS (ref 22–38)
BACTERIA UR CULT: ABNORMAL
BACTERIA UR CULT: ABNORMAL

## 2024-10-20 PROCEDURE — 99232 SBSQ HOSP IP/OBS MODERATE 35: CPT | Performed by: STUDENT IN AN ORGANIZED HEALTH CARE EDUCATION/TRAINING PROGRAM

## 2024-10-20 PROCEDURE — 250N000011 HC RX IP 250 OP 636: Performed by: NURSE PRACTITIONER

## 2024-10-20 PROCEDURE — 250N000013 HC RX MED GY IP 250 OP 250 PS 637: Performed by: INTERNAL MEDICINE

## 2024-10-20 PROCEDURE — 36415 COLL VENOUS BLD VENIPUNCTURE: CPT | Performed by: STUDENT IN AN ORGANIZED HEALTH CARE EDUCATION/TRAINING PROGRAM

## 2024-10-20 PROCEDURE — 70450 CT HEAD/BRAIN W/O DYE: CPT

## 2024-10-20 PROCEDURE — 120N000001 HC R&B MED SURG/OB

## 2024-10-20 PROCEDURE — 250N000011 HC RX IP 250 OP 636: Performed by: INTERNAL MEDICINE

## 2024-10-20 PROCEDURE — 99232 SBSQ HOSP IP/OBS MODERATE 35: CPT | Performed by: NURSE PRACTITIONER

## 2024-10-20 PROCEDURE — 92526 ORAL FUNCTION THERAPY: CPT | Mod: GN

## 2024-10-20 PROCEDURE — 85730 THROMBOPLASTIN TIME PARTIAL: CPT | Performed by: STUDENT IN AN ORGANIZED HEALTH CARE EDUCATION/TRAINING PROGRAM

## 2024-10-20 PROCEDURE — 250N000013 HC RX MED GY IP 250 OP 250 PS 637: Performed by: NURSE PRACTITIONER

## 2024-10-20 RX ADMIN — CARBIDOPA AND LEVODOPA 1 TABLET: 25; 250 TABLET ORAL at 17:22

## 2024-10-20 RX ADMIN — ATORVASTATIN CALCIUM 40 MG: 40 TABLET, FILM COATED ORAL at 20:13

## 2024-10-20 RX ADMIN — CEFTRIAXONE SODIUM 1 G: 1 INJECTION, POWDER, FOR SOLUTION INTRAMUSCULAR; INTRAVENOUS at 20:26

## 2024-10-20 RX ADMIN — ENTACAPONE 200 MG: 200 TABLET, FILM COATED ORAL at 13:31

## 2024-10-20 RX ADMIN — HYDRALAZINE HYDROCHLORIDE 10 MG: 20 INJECTION INTRAMUSCULAR; INTRAVENOUS at 04:25

## 2024-10-20 RX ADMIN — ACETAMINOPHEN 1000 MG: 500 TABLET ORAL at 20:13

## 2024-10-20 RX ADMIN — DONEPEZIL HYDROCHLORIDE 10 MG: 10 TABLET ORAL at 21:36

## 2024-10-20 RX ADMIN — ENTACAPONE 200 MG: 200 TABLET, FILM COATED ORAL at 10:01

## 2024-10-20 RX ADMIN — CARBIDOPA AND LEVODOPA 1 TABLET: 25; 250 TABLET ORAL at 13:30

## 2024-10-20 RX ADMIN — CARBIDOPA AND LEVODOPA 1 TABLET: 25; 100 TABLET, EXTENDED RELEASE ORAL at 18:55

## 2024-10-20 RX ADMIN — CALCIUM POLYCARBOPHIL 1250 MG: 625 TABLET, FILM COATED ORAL at 10:01

## 2024-10-20 RX ADMIN — CARBIDOPA AND LEVODOPA 1 TABLET: 50; 200 TABLET, EXTENDED RELEASE ORAL at 20:16

## 2024-10-20 RX ADMIN — ACETAMINOPHEN 1000 MG: 500 TABLET ORAL at 10:00

## 2024-10-20 RX ADMIN — HEPARIN SODIUM 700 UNITS/HR: 10000 INJECTION, SOLUTION INTRAVENOUS at 13:34

## 2024-10-20 RX ADMIN — CARBIDOPA AND LEVODOPA 1 TABLET: 25; 250 TABLET ORAL at 10:01

## 2024-10-20 RX ADMIN — ENTACAPONE 200 MG: 200 TABLET, FILM COATED ORAL at 18:49

## 2024-10-20 ASSESSMENT — ACTIVITIES OF DAILY LIVING (ADL)
ADLS_ACUITY_SCORE: 55
ADLS_ACUITY_SCORE: 57
ADLS_ACUITY_SCORE: 57
ADLS_ACUITY_SCORE: 55
ADLS_ACUITY_SCORE: 55
ADLS_ACUITY_SCORE: 54
ADLS_ACUITY_SCORE: 57
ADLS_ACUITY_SCORE: 55
ADLS_ACUITY_SCORE: 54
ADLS_ACUITY_SCORE: 57
ADLS_ACUITY_SCORE: 57
ADLS_ACUITY_SCORE: 55
ADLS_ACUITY_SCORE: 54
ADLS_ACUITY_SCORE: 57
ADLS_ACUITY_SCORE: 57
ADLS_ACUITY_SCORE: 55
ADLS_ACUITY_SCORE: 54

## 2024-10-20 NOTE — PROGRESS NOTES
[facility]    Vascular Neurology Progress Note    Interval History     - CUS completed yesterday demonstrates L ICA thrombus with occlusion.   - Updated Jana and Philip yesterday. Plan to proceed with medical management.  - She reports feeling well this morning, exam is stable.   - CTH with continued evolution of left occipital infarct with small petechial hemorrhage within the stroke bed. Reviewed with stroke attending and will continue heparin.     ADDENDUM:   Spoke with Jana this afternoon to update her on CUS, CT and plan for medication management.     Hospital Course     Chief complaint: Fall, Head Injury, and Neck Pain    84 year old with PMH of Parkinson's disease, Atrial fibrillation on Eliquis, and dementia. CTH was concerning for left occipital hypodensity, MRI brain showed scattered left hemisphere infarcts. She resides in memory care and is unable to given additional details surrounding her fall. Family shares that at [facility], Jennifer utilizes a walker and needs assistance with all ADLs. We discussed medical and surgical management of carotid stenosis and they confirmed that surgery would not be within goals of care.  Family would like to request that when she returns to [place], she be placed on a toileting schedule as she is often unaware of needing to use the bathroom and may not ask for assistance when needed.   10/17 - Eliquis held given scalp hematoma  10/19 - low intensity heparin drip started     Assessment and Plan     1. Acute ischemic stroke of left hemisphere watershed territory and left temporo-occipital junction due to large-artery atherosclerosis in the setting of >90% proximal L ICA stenosis. Vessel imaging also demonstrates multifocal intra and extracranial atherosclerotic disease.     - Continue low intensity heparin/no boluses.   --- Monitor scalp hematoma for expansion while anticoagulated  --- Anticipate that she could transition back to  "Eliquis tomorrow if she remains stable. Would consider addition of ASA 81 mg daily. Final plan pending.   - , started on atorvastatin 40 mg daily with goal LDL 40-70  - A1c 6.0, within goal <7.0  - Okay to  restart home antihypertensive regimen. Inpatient goal SBP <180. Long term goal BP <140/90 with tighter control associated with decreased overall CV risk, if tolerated  - PT/OT/SLP  - Stroke education    Pending Evaluation  - Consider repeat CTA in coming days to evaluate for improvement in L ICA    2. History of atrial fibrillation  - Anticoagulation, as above.     3. History of Parkinson's disease and dementia  - Continue PTA medications     DVT Prophylaxis: heparin drip    Patient Follow-up    - final recommendation pending work-up    We will continue to follow.     NAREN Jacinto, CNP  Neurology  10/20/2024 7:59 AM  To page stroke neurology after hours or on a subsequent day, click here: [facility]COM  Choose \"On Call\" tab at top, then search dropdown box for \"Neurology Adult\" & press Enter, look for Neuro ICU/Stroke     Physical Examination     Temp: 97.5  F (36.4  C) Temp src: Axillary BP: (!) 172/90 Pulse: 102   Resp: 16 SpO2: 97 % O2 Device: None (Room air)      Neurologic  Mental Status: wakes to voice, follows commands, speech clear, no word finding difficulty  Cranial Nerves:  homonymous R lower quadrantanopia (some RUQ impairment but no dense field cut), facial sensation intact and symmetric, mild right facial weakness, hearing not formally tested but intact to conversation, no dysarthria, tongue protrusion midline  Motor:  normal muscle tone and bulk, no abnormal movements, able to move all limbs spontaneously, mild drift in RUE  Reflexes:  toes down-going  Sensory:  light touch sensation intact and symmetric throughout upper and lower extremities, no extinction on double simultaneous stimulation   Coordination: no ataxia out of proportion to weakness   Station/Gait:  deferred    Stroke Scales "       NIHSS  1a. Level of Consciousness 0-->Alert, keenly responsive   1b. LOC Questions 1-->Answers one question correctly   1c. LOC Commands 0-->Performs both tasks correctly   2.   Best Gaze 0-->Normal   3.   Visual 1-->Partial hemianopia   4.   Facial Palsy 1-->Minor paralysis (flattened nasolabial fold, asymmetry on smiling)   5a. Motor Arm, Left 0-->No drift, limb holds 90 (or 45) degrees for full 10 secs   5b. Motor Arm, Right 1-->Drift, limb holds 90 (or 45) degrees, but drifts down before full 10 secs, does not hit bed or other support   6a. Motor Leg, Left 0-->No drift, leg holds 30 degree position for full 5 secs   6b. Motor Leg, right 0-->No drift, leg holds 30 degree position for full 5 secs   7.   Limb Ataxia 0-->Absent   8.   Sensory 0-->Normal, no sensory loss   9.   Best Language 0-->No aphasia, normal   10. Dysarthria 0-->Normal   11. Extinction and Inattention  0-->No abnormality   Total 4 (10/20/24 1200)       Imaging/Labs   (Bolded imaging and labs new and/or personally reviewed or re-reviewed by me today)    MRI/Head CT MRI brain: Moderate acute/early subacute infarction left temporal occipital region extending into left parietal lobe.   Additional multiple small patchy and punctate foci of acute/early subacute infarction in left frontal and parietal lobe. Small amount of associated the petechial hemorrhage in the left temporal occipital region.   CTH 10/20: Continued evolution of subacute infarct within the left occipital lobe. There is likely petechial hemorrhage within the region of infarct. No chante lobar hemorrhage.    Intracranial Vasculature CTA:   1.  Retrograde filling in the R V4 segment.  2.  Focal moderate stenosis in the R A2 segment.  3.  Moderate to severe focal stenosis in the L P1 segment, moderate stenosis beyond P2 segment.  4.  Multifocal mild stenosis in the anterior and posterior circulation   Cervical Vasculature Greater than 90% stenosis of the left ICA bifurcation  secondary to hypodense, likely ulcerated plaque.  2.  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  CUS: Occluded L ICA, <50% stenosis R ICA      Echocardiogram EF 60-65%, moderate concentric LVH, normal LA   EKG/Telemetry Sinus Rhythm   Other Testing CT CAP: no evidence of acute trauma, noted to have hypo attenuation in the left ventricular apex concerning for old infarct vs thrombus.       LDL  10/17/2024: 111 mg/dL   A1C  10/17/2024: 6.0 %   Troponin 10/17/2024: 66 ng/L          Time Spent on this Encounter   Billing: I have personally spent a total of 35 minutes providing care today, time spent in reviewing medical records and devising the plan as recorded above.

## 2024-10-20 NOTE — PLAN OF CARE
Reason for Admission: L CVA, L ICA stenosis, fall with scalp hematoma    Cognitive/Mentation: A/Ox 1-2 (disoriented to place, time, situation)  Neuros/CMS: Intact ex R field cut  VS: stable ex pt hypertensive (185/110, 200/115).  Administered prn 10mg hydralazine x2.   Tele: NSR.  Moments of A-fib RVR this am  /GI: Incontinent.  Purewick placed/changed overnight. Last BM PTA.   Pulmonary: LS clear.  Pain: denies.     Drains/Lines: L PIV with Heparin infusing @ 700 units per hour.  Next PTT recheck @ 11:21 am.    Skin: kodi, dry, flaky. Scattered bruising, hematoma occipital scalp  Activity: Assist x 2 with SS.  Diet: soft bite sized with thin liquids. Takes pills crushed in pudding.  Assisted feeding.     Therapies recs: PT recommending TCU, but family has stated they would prefer to have pt discharge back to memory care  Discharge: pending    Aggression Stoplight Tool: green    End of shift summary: Ptt @ 2030 in goal range.  PTT rechecked @ 0353 was 82 and out of range.  Followed heparin protocol (stopped infusion for 60 minutes, and subtracted 200 units from previous rate). Next PTT draw @ 11:21 am.  Pt. Hypertensive this shift.  Messaged provider to obtain prn.  Administered hydralazine x2 (next dose available at 10:25 am).  Planning on repeat CT in coming days to evaluate for improvement in L ICA.  SLP today

## 2024-10-20 NOTE — PROGRESS NOTES
Notified hospitalist of pt /102 and no prn ordered.  MD ordered 10mg hydralazine @ 2148.  Ordered q6 hours.  RN administered and will continue to monitor.

## 2024-10-20 NOTE — PLAN OF CARE
Goal Outcome Evaluation:    Plan of Care Reviewed With: patient, child    Overall Patient Progress: improvingOverall Patient Progress: improving    Pt here with L temporo-occipital stroke,  L ICA stenosis, fall with posterior scalp hematoma. Pt A&O x1-2, knows she is in the hospital, confused to time and situation. Hx of Parkinson, Afib, and dementia. Elevated BP, SBP<220, goal is <180, otherwise VSS, on RA. LS clear. Tele Afib with CVR. Denies pain. CMS and Neuro's intact except for R field cut, generalize weakness, BLE 3/5 strength. T&R q2hrs, A2, lift. Incont B&B, purewick in place, adequate output. +BS, passing flatus, no BM this shift. Hep drip increased to 850units, next PTT lab redraw scheduled for 1900. Soft bite sized thin liquid diet, total feed, takes pills crushed in pudding. Repeat head CT, today showed evolving stroke. Pt scoring green on Aggression Stop Light Tool. Recommending TCU, family would like patient to go back to  [facility]. Discharge pending when medically stable.

## 2024-10-20 NOTE — PROGRESS NOTES
[facility]    Medicine Progress Note - Hospitalist Service    Date of Admission:  10/17/2024    Assessment & Plan   Jennifer Venegas is a 84 year old female with history of Parkinson's disease, atrial fibrillation on chronic anticoagulation with Eliquis, hypertension, dementia who presented on 101/7 with head injury and neck pain after a fall.    Acute ischemic stroke of left hemisphere watershed territory and left temporo-occipital junction due to large-artery atherosclerosis in the setting of >90% proximal L ICA stenosis.   Multifocal intra and extracranial atherosclerotic disease.   Unwitnessed fall  Scalp hematoma   Pt is a poor historian 2/2 underlying dementia. Presented to the emergency department after an unwitnessed fall at her memory care facility.   * CT head obtained in the ED showed large left parietal occipital scalp hematoma.  It also shows hypodensity and loss of gray-white differentiation in the left occipital lobe concerning for acute/subacute infarct  * Brain MRI obtained and showed Moderate acute/early subacute infarction left temporal occipital region extending into left parietal lobe.   Additional multiple small patchy and punctate foci of acute/early subacute infarction in left frontal and parietal lobe. Small amount of associated the petechial hemorrhage in the left temporal occipital region. There is also intra and extracranial atherosclerotic disease including greater than 90% stenosis of the left ICA bifurcation secondary to hypodense, likely ulcerated plaque.   * Ultrasound of the carotid artery demonstrated L ICA thrombus with occlusion.   * CTH on 10/20 with continued evolution of the left occipital infarct with small petechial hemorrhage within the stroke bed.   - Admit to inpatient   - Every 4 neurochecks  - Stroke neurology consulted  - Given findings of severe ICA stenosis and possible ulcerated plaque, stroke neuro recommending heparin drip, continuing for  "now   - Planning for repeat CTA in the coming days to evaluate for improvement in L ICA   - Pt's family would not be interested in CEA or other intervention given pt's dementia. They support limited care plan and hope to discharge back to memory care on 10/21 if possible.    - Atorvastatin 40mg daily   - Fall precaution  - A1c 6.0, within goal <7.0  - Inpatient goal SBP <180. Long term goal BP <140/90 with tighter control associated with decreased overall CV risk, if tolerated  - PT OT speech evaluation and treatment  - Family would like patient discharged back to memory care on 10/21 if possible.     Klebsiella Pneumoniae Urinary tract infection   UA noted to be abnormal on admission. Urine culture now growing gram negative bacilli  - Continue Ceftriaxone (day 4)     History of atrial fibrillation on chronic anticoagulation with Eliquis  -Holding PTA Eliquis as above given scalp hematoma, appropriate timing of resumption as guided by stroke neurology  - Continuing on heparin as noted above     Hypertension  Dyslipidemia  - Holding PTA Lasix to allow permissive hypertension  - Not on statin PTA     History of Parkinson's disease  History of dementia  Resides in memory care at [place].   - Resume PTA Sinemet, Comtan, Aricept         Diet: Combination Diet Soft and Bite Sized Diet (level 6); Thin Liquids (level 0) (direct assist (set up, may need hand over hand but allow to self-feed), slow rate, single bites/sips, liquid wash PRN)    DVT Prophylaxis: Pneumatic Compression Devices  [place] Catheter: Not present  Lines: None     Cardiac Monitoring: ACTIVE order. Indication: Stroke, acute (48 hours)  Code Status: No CPR- Do NOT Intubate      Clinically Significant Risk Factors                   # Hypertension: Noted on problem list    # Dementia: noted on problem list        # Overweight: Estimated body mass index is 28.73 kg/m  as calculated from the following:    Height as of 10/2/24: 1.651 m (5' 5\").    Weight as of " this encounter: 78.3 kg (172 lb 9.9 oz)., PRESENT ON ADMISSION       # Financial/Environmental Concerns: none         Disposition Plan     Medically Ready for Discharge: Anticipated Tomorrow      Dayan Olivas MD  Hospitalist Service  [facility]  Securely message with [employer] (more info)  Text page via [employer] Paging/Directory   ______________________________________________________________________    Interval History   NAEO. Pt does not provide any accurate history. Thinks she is in a hotel room. Denies pain or other concerns today. Eating lunch.     Physical Exam   Vital Signs: Temp: 97.5  F (36.4  C) Temp src: Axillary BP: 113/62 Pulse: 79   Resp: 16 SpO2: 97 % O2 Device: None (Room air)    Weight: 172 lbs 9.92 oz    Constitutional: Awake, alert, cooperative, no apparent distress.  Eyes: Conjunctiva and pupils examined and normal.  HEENT: Moist mucous membranes, normal dentition.  Respiratory: Clear to auscultation bilaterally, no crackles or wheezing.  Cardiovascular: Regular rate and rhythm, normal S1 and S2, and no murmur noted.  GI: Soft, non-distended, non-tender, normal bowel sounds.  Skin: No rashes, no cyanosis, no edema.  Musculoskeletal: No joint swelling, erythema or tenderness.  Neurologic: Cranial nerves 2-12 intact, normal strength and sensation.  Psychiatric: Alert, oriented to person only, no obvious anxiety or depression.      Medical Decision Making       60 MINUTES SPENT BY ME on the date of service doing chart review, history, exam, documentation & further activities per the note.      Data     I have personally reviewed the following data over the past 24 hrs:    INR:  N/A PTT:  50 (H)   D-dimer:  N/A Fibrinogen:  N/A       Imaging results reviewed over the past 24 hrs:   Recent Results (from the past 24 hour(s))   CT Head w/o Contrast    Narrative    EXAM: CT HEAD W/O CONTRAST  LOCATION: [facility]  DATE: 10/20/2024    INDICATION: recent  stroke, on heparin. evaluate for hemorrhagic transformation.  COMPARISON: None.  TECHNIQUE: Routine CT Head without IV contrast. Multiplanar reformats. Dose reduction techniques were used.    FINDINGS:  INTRACRANIAL CONTENTS: Continued evolution of subacute infarct within the left occipital lobe. There is likely petechial hemorrhage within the region of infarct. No chante lobar hemorrhage. No other evidence of acute intracranial hemorrhage or mass   effect. Scattered foci of decreased attenuation within the cerebral hemispheric white matter which are nonspecific, though most commonly ascribed to chronic small vessel ischemic disease. The ventricles and sulci are prominent consistent with moderate   brain parenchymal volume loss. Gray-white matter differentiation is otherwise maintained. The basilar cisterns are patent.    VISUALIZED ORBITS/SINUSES/MASTOIDS: The globes are unremarkable. The partially imaged paranasal sinuses, mastoid air cells and middle ear cavities are unremarkable.     BONES/SOFT TISSUES: The visualized skull base and calvarium are unremarkable.      Impression    IMPRESSION:    1.  Continued evolution of subacute infarct within the left occipital lobe. There is likely petechial hemorrhage within the region of infarct. No chante lobar hemorrhage.   2.  No other evidence of acute intracranial hemorrhage or mass effect.

## 2024-10-20 NOTE — PROGRESS NOTES
Care Management Follow Up    Length of Stay (days): 3    Expected Discharge Date:       Concerns to be Addressed: discharge planning     Patient plan of care discussed at interdisciplinary rounds: Yes    Anticipated Discharge Disposition:      Anticipated Discharge Services:    Anticipated Discharge DME:      Patient/family educated on Medicare website which has current facility and service quality ratings:    Education Provided on the Discharge Plan:    Patient/Family in Agreement with the Plan:      Referrals Placed by CM/JAMARI:    Private pay costs discussed: Not applicable    Discussed  Partnership in Safe Discharge Planning  document with patient/family: No     Handoff Completed: No, handoff not indicated or clinically appropriate    Additional Information:  JAMARI messaged Juancho from [facility] to ask if pt can return with assist of 1-2 w/ anish steady. JAMARI left a vm for nursing staff at the [facility] asking for a callback.     JAMARI spoke with a RN from [facility] ([phone]). They said pt can come back to [facility] w/ assist 1-2 w/ anish steady and the pt will need to have a care conference because she is returning at a different ability than he baseline.     JAMARI confirmed with MD that pt is medically ready tomorrow. JAMARI called pt son, Philip, to inform him pt can go back to [facility] and asked his preferences for transport. Philip is working tomorrow so he requested a wheelchair ride for pt and is aware of costs. JAMARI set up a transport ride for tomorrow, 10/21 between 1040 and 1120. JAMARI notified [facility] of transport plan.     Next Steps: Coordinate safe discharge to [facility].     Cierra Martinez, [facility]  Inpatient Care Management

## 2024-10-21 VITALS
OXYGEN SATURATION: 96 % | DIASTOLIC BLOOD PRESSURE: 97 MMHG | RESPIRATION RATE: 18 BRPM | TEMPERATURE: 97.4 F | WEIGHT: 172.62 LBS | SYSTOLIC BLOOD PRESSURE: 168 MMHG | BODY MASS INDEX: 28.73 KG/M2 | HEART RATE: 87 BPM

## 2024-10-21 DIAGNOSIS — I63.9 ISCHEMIC STROKE (H): Primary | ICD-10-CM

## 2024-10-21 LAB — APTT PPP: 75 SECONDS (ref 22–38)

## 2024-10-21 PROCEDURE — 250N000013 HC RX MED GY IP 250 OP 250 PS 637: Performed by: INTERNAL MEDICINE

## 2024-10-21 PROCEDURE — 99239 HOSP IP/OBS DSCHRG MGMT >30: CPT | Performed by: INTERNAL MEDICINE

## 2024-10-21 PROCEDURE — 36415 COLL VENOUS BLD VENIPUNCTURE: CPT | Performed by: INTERNAL MEDICINE

## 2024-10-21 PROCEDURE — 85730 THROMBOPLASTIN TIME PARTIAL: CPT | Performed by: INTERNAL MEDICINE

## 2024-10-21 RX ORDER — ENTACAPONE 200 MG/1
200 TABLET ORAL 3 TIMES DAILY
Status: SHIPPED
Start: 2024-10-21

## 2024-10-21 RX ORDER — ATORVASTATIN CALCIUM 40 MG/1
40 TABLET, FILM COATED ORAL EVERY EVENING
Qty: 30 TABLET | Refills: 0 | Status: SHIPPED | OUTPATIENT
Start: 2024-10-21 | End: 2024-10-28

## 2024-10-21 RX ORDER — CARBIDOPA/LEVODOPA 25MG-250MG
1 TABLET ORAL 3 TIMES DAILY
Status: SHIPPED
Start: 2024-10-21 | End: 2024-10-28

## 2024-10-21 RX ORDER — SULFAMETHOXAZOLE AND TRIMETHOPRIM 800; 160 MG/1; MG/1
1 TABLET ORAL 2 TIMES DAILY
Qty: 6 TABLET | Refills: 0 | Status: SHIPPED | OUTPATIENT
Start: 2024-10-21 | End: 2024-10-24

## 2024-10-21 RX ORDER — ASPIRIN 81 MG/1
81 TABLET ORAL DAILY
Qty: 30 TABLET | Refills: 0 | Status: SHIPPED | OUTPATIENT
Start: 2024-10-21 | End: 2024-11-01

## 2024-10-21 RX ADMIN — APIXABAN 5 MG: 5 TABLET, FILM COATED ORAL at 08:16

## 2024-10-21 RX ADMIN — CALCIUM POLYCARBOPHIL 1250 MG: 625 TABLET, FILM COATED ORAL at 08:16

## 2024-10-21 RX ADMIN — ENTACAPONE 200 MG: 200 TABLET, FILM COATED ORAL at 08:16

## 2024-10-21 RX ADMIN — ACETAMINOPHEN 1000 MG: 500 TABLET ORAL at 08:16

## 2024-10-21 RX ADMIN — CARBIDOPA AND LEVODOPA 1 TABLET: 25; 250 TABLET ORAL at 08:16

## 2024-10-21 ASSESSMENT — ACTIVITIES OF DAILY LIVING (ADL)
ADLS_ACUITY_SCORE: 57
ADLS_ACUITY_SCORE: 55
ADLS_ACUITY_SCORE: 57
ADLS_ACUITY_SCORE: 55

## 2024-10-21 NOTE — PLAN OF CARE
Reason for Admission: L CVA, posterior scalp hematoma    Cognitive/Mentation: A/Ox 1-2, disoriented to time/place  Neuros/CMS: Intact ex R field cut, generalized weakness, BLE 3/5. Not following many commands.  VS: stable on RA, SBP goal <180.   Tele: Afib CVR.  /GI: Incontinent. Last BM PTA. Purewick in place while in bed.   Pulmonary: LS clear.  Pain: denies.     Drains/Lines: PIV infusing Heparin at 850 units/hr, recheck at 1900  Skin: kodi, scattered bruising/scabbing, posterior scalp hematoma MARGARETH  Activity: Assist x 2 with lift.  Diet: Soft/bite sized with thin liquids. Takes pills crushed w/ pudding.     Therapies recs: TCU vs memory care  Discharge: pending    Aggression Stoplight Tool: green    End of shift summary: Assumed care from [id number]. Pt stable this shift, pleasant with cares.

## 2024-10-21 NOTE — PLAN OF CARE
Physical Therapy Discharge Summary    Reason for therapy discharge:    Discharged to long term care facility.    Progress towards therapy goal(s). See goals on Care Plan in [facility] electronic health record for goal details.  Goals partially met.  Barriers to achieving goals:   discharge from facility.    Therapy recommendation(s):    Pt remains well below baseline functional independence. Pt limited by decreased activity tolerance, decreased strength, impaired balance, impaired proprioception, cognition. Pt at baseline lives in memory care facility, uses walker for ambulation ind, assist for ADLs and IADLs. [facility], unable to safely ambulate at this time.

## 2024-10-21 NOTE — DISCHARGE INSTRUCTIONS
Your risk factors for stroke or TIA (transient ischemic attack):     Your Risk Factors Your Results Goals   [] High blood pressure BP: (!) 168/97 (10/21/24 0847) Less than 120/80   [] Cholesterol          Total 10/17/2024: 204 mg/dL   Less than 150    Triglycerides   10/17/2024: 231 mg/dL Less than 150    LDL 10/17/2024: 111 mg/dL    Less than 70    HDL 10/17/2024: 47 mg/dL         Greater than 40 (men)  Greater than 50 (women)   [] Diabetes                A1C 10/17/2024: 6.0 % Less than 5.7   [] Atrial fibrillation Atrial fibrillation noted on cardiac monitoring Manage per physician orders   [] Smoking/tobacco use   Tobacco Use      Smoking status: Never      Smokeless tobacco: Never   Quit smoking and tobacco   [] Overweight Body mass index is 28.73 kg/m .  Less than 25     Other risk factors include: carotid (neck) artery disease, other heart diseases, prior stroke or TIA, poor diet, lack of exercise, and excessive alcohol consumption.       Know the warning signs and symptoms of stroke: BE FAST     B = Balance loss   E = Eyesight changes   F = Facial droop or numbness   A = Arm or leg weakness   S = Speech difficulty, slurred speech   T = Time to call 911 for help

## 2024-10-21 NOTE — PROGRESS NOTES
Care Management Discharge Note    Discharge Date: 10/21/2024       Discharge Disposition: Skilled Nursing Facility    Discharge Services: None    Discharge DME: None    Discharge Transportation:      Private pay costs discussed: transportation costs    Does the patient's insurance plan have a 3 day qualifying hospital stay waiver?  No    PAS Confirmation Code:    Patient/family educated on Medicare website which has current facility and service quality ratings: no    Education Provided on the Discharge Plan: Yes  Persons Notified of Discharge Plans: Bedside nurse, family   Patient/Family in Agreement with the Plan: yes    Handoff Referral Completed: No, handoff not indicated or clinically appropriate    Additional Information:  Patient is medically ready to go back to [facility] today.[facility] wheel chair ride was arranged per the families request and cost was also discussed. Writer called facility to get a fax number for discharge orders.     Writer updated patients son Philip on discharge time as well as bedside nurse.    OMAIRA SORIA  [facility]  INPATIENT CARE COORDINATION

## 2024-10-21 NOTE — PROGRESS NOTES
"Brief Stroke Note:    Planning for discharge back to [facility] today.  Okay to stop heparin drip and transition to Eliquis 5 mg twice daily plus aspirin 81 mg.  No indication for CTA head/neck today as this would not change anticoagulation plan.    1. Acute ischemic stroke of left hemisphere watershed territory and left temporo-occipital junction due to large-artery atherosclerosis in the setting of >90% proximal L ICA stenosis. Vessel imaging also demonstrates multifocal intra and extracranial atherosclerotic disease.      - Eliquis 5 mg twice daily indefinitely.  ASA 81 mg x 30 days then stop  - , started on atorvastatin 40 mg daily with goal LDL 40-70  - A1c 6.0, within goal <7.0  - Okay to  restart home antihypertensive regimen. Inpatient goal SBP <180. Long term goal BP <140/90 with tighter control associated with decreased overall CV risk, if tolerated  - PT/OT/SLP  - Stroke education     2. History of atrial fibrillation  - Anticoagulation, as above.      3. History of Parkinson's disease and dementia  - Continue PTA medications     Follow-Up:  - 1-2 weeks with PCP   - 6 to 8 weeks with stroke neurology    Thank you for this consult. No further stroke evaluation is indicated, we will sign off. Please contact us with additional questions.     NAREN Jacinto, CNP  Neurology  10/21/2024 5:03 PM  To page stroke neurology after hours or on a subsequent day, click here: [facility]  Choose \"On Call\" tab at top, then search dropdown box for \"Neurology Adult\" & press Enter, look for Neuro ICU/Stroke         "

## 2024-10-21 NOTE — DISCHARGE SUMMARY
[facility] [place]    Discharge Summary  Hospitalist    Date of Admission:  10/17/2024  Date of Discharge:  10/21/2024  Discharging Provider: Hoa Nuñez DO    Discharge Diagnoses   Acute ischemic stroke of left hemisphere watershed territory and left temporo-occipital junction dt large-artery atherosclerosis in the setting of >90% proximal L ICA stenosis  Multifocal intra and extracranial atherosclerotic disease  Unwitnessed fall  Occipital scalp hematoma   Afib, on chronic anticoagulation with DOAC  Hypertension  Dyslipidemia  Uncomplicated UTI dt Klebsiella Pneumoniae  Parkinson's disease  Dementia    History of Present Illness   Jennifer Venegas is a 84 year old female with PMHx of afib on chronic anticoagulation with DOAC, hypertension, hyperlipidemia, Parkinson's disease and dementia who was admitted on 10/17/2024 with an acute ischemic stroke, scalp hematoma and neck pain after a fall.     Hospital Course   Jennifer Venegas was admitted on 10/17/2024.  The following problems were addressed during her hospitalization:    Acute ischemic stroke of left hemisphere watershed territory and left temporo-occipital junction dt large-artery atherosclerosis in the setting of >90% proximal L ICA stenosis  Multifocal intra and extracranial atherosclerotic disease  Unwitnessed fall  Occipital scalp hematoma   *Patient is a poor historian due to underlying dementia.  Was brought to the ED for evaluation after an unwitnessed fall at her memory care facility.  *Head CT showed a large left parietal occipital scalp hematoma and a hypodensity and loss of gray-white differentiation in the left occipital lobe concerning for acute/subacute infarct.  Admitted for further evaluation.  Stroke team consulted.  *MRI brain showed moderate acute/early subacute infarction left temporal occipital region extending into left parietal lobe; additional multiple small patchy and punctate foci of acute/early subacute  infarction in left frontal and parietal lobe. Small amount of associated the petechial hemorrhage in the left temporal occipital region. There is also intra and extracranial atherosclerotic disease including greater than 90% stenosis of the left ICA bifurcation secondary to hypodense, likely ulcerated plaque.   *Carotid US 10/19 showed a L ICA thrombus with occlusion.  Was subsequently initiated on a heparin drip.  Patient's family stated they would not be interested in pursuing a CEA or other interventions given patient's dementia.  They supported a limited care plan with hope to discharge back to her memory care facility.  *Repeat head CT on 10/20 showed continued evolution of the left occipital infarct with small petechial hemorrhage within the stroke bed.   *Remainder of stroke workup was unremarkable -- A1c 6.0, goal SBP <140/90, FLP with  so was started on statin this stay.   *DOAC resumed 10/21 as above. Stroke team recommended addition of ASA 81mg daily as well.  *OP follow up with PCP.  *Cont soft/bite sized diet (level 6) with thin liquids (level 0)  *PT recommended TCU, patient's family prefers discharge back to memory care      Afib, on chronic anticoagulation with DOAC  Hypertension  Dyslipidemia  *DOAC held on admission dt scalp hematoma.  Was started on heparin drip on 10/19 given findings of ICA thrombus with occlusion.  Remained stable on heparin drip, so was ultimately transition back to DOAC on 10/21  *PTA Lasix held on admission to allow for permissive hypertension  *Had not been on a statin PTA, was initiated on atorvastatin this day as above     Uncomplicated UTI dt Klebsiella Pneumoniae  *Abnl UA on admission but no s/sx of sepsis. UC ultimately grew 2 strains of klebsiella pneumonia (>100k).   *Managed with ceftriaxone this stay(4 days). Transitioned to Bactrim at discharge to complete 7d total treatment course     Parkinson's disease  Dementia  *Resides in memory care. Chronic and  stable on Sinemet, Comtan, Aricept. Discharged back to memory care facility.     Hoa Nuñez DO    Code Status   DNR / DNI       Primary Care Physician   Sean Singh    Physical Exam   Temp: 98.9  F (37.2  C) Temp src: Axillary BP: (!) 142/82 Pulse: 108   Resp: 18 SpO2: 98 % O2 Device: None (Room air)    Vitals:    10/19/24 1300   Weight: 78.3 kg (172 lb 9.9 oz)     Vital Signs with Ranges  Temp:  [97.5  F (36.4  C)-98.9  F (37.2  C)] 98.9  F (37.2  C)  Pulse:  [] 108  Resp:  [16-18] 18  BP: (109-149)/(62-88) 142/82  SpO2:  [93 %-98 %] 98 %  I/O last 3 completed shifts:  In: 480 [P.O.:480]  Out: 400 [Urine:400]      General: Resting comfortably, wakes to name, oriented to self, NAD  CVS: HRRR, no MGR, no LE edema  Respiratory: CTAB, no wheeze, no increased work of breathing  GI: S, NT, ND, +BS  Skin: Warm/dry  Neuro: CNs intact, no focal motor/sensory deficits    Discharge Disposition   Discharged to long-term care facility  Condition at discharge: Stable    Consultations This Hospital Stay   NEUROLOGY IP STROKE CONSULT  ---------------------------------  SPEECH LANGUAGE PATH ADULT IP CONSULT  PHYSICAL THERAPY ADULT IP CONSULT  OCCUPATIONAL THERAPY ADULT IP CONSULT  REHAB ADMISSIONS LIAISON IP CONSULT  CARE MANAGEMENT / SOCIAL WORK IP CONSULT  SMOKING CESSATION PROGRAM IP CONSULT  PHARMACY IP CONSULT    Time Spent on this Encounter   Hoa NOGUEIRA DO, personally saw the patient today and spent greater than 30 minutes discharging this patient.    Discharge Orders      Activity    Your activity upon discharge: activity as tolerated, up with nursing assistance     Follow-up and recommended labs and tests     1.  Follow-up with facility physician in the next week, recommend basic labs including a BMP and CBC.     Reason for your hospital stay    Evaluation after your fall, for which you were found to have a hematoma on your scalp.  Additionally you were found to have sustained  strokes due to a thrombus in your carotid artery.  You were managed with blood thinners during your stay.     Diet    Follow this diet upon discharge: Soft and Bite Sized Diet (level 6); Thin Liquids (level 0) (direct assist (set up, may need hand over hand but allow to self-feed), slow rate, single bites/sips, liquid wash PRN)     Discharge Medications   Current Discharge Medication List        START taking these medications    Details   aspirin 81 MG EC tablet Take 1 tablet (81 mg) by mouth daily.  Qty: 30 tablet, Refills: 0    Associated Diagnoses: Ischemic stroke (H)      atorvastatin (LIPITOR) 40 MG tablet Take 1 tablet (40 mg) by mouth every evening.  Qty: 30 tablet, Refills: 0    Associated Diagnoses: Ischemic stroke (H)      sulfamethoxazole-trimethoprim (BACTRIM DS) 800-160 MG tablet Take 1 tablet by mouth 2 times daily for 3 days.  Qty: 6 tablet, Refills: 0    Associated Diagnoses: UTI due to Klebsiella species           CONTINUE these medications which have CHANGED    Details   carbidopa-levodopa (SINEMET)  MG tablet Take 1 tablet by mouth 3 times daily. 0700, 1100, 1500    Associated Diagnoses: Parkinson's disease without dyskinesia, with fluctuating manifestations (H)      entacapone (COMTAN) 200 MG tablet Take 1 tablet (200 mg) by mouth 3 times daily. 0700, 1100, 1500    Associated Diagnoses: Parkinson's disease without dyskinesia, with fluctuating manifestations (H)           CONTINUE these medications which have NOT CHANGED    Details   acetaminophen (TYLENOL) 500 MG tablet TAKE TWO TABLETS (1000MG) BY MOUTH TWICE DAILY;AND TAKE TWO TABLETS (1000MG) DAILY AS NEEDED  Qty: 186 tablet, Refills: 11    Comments: CYCLE FILL REFILL REQUEST FOR CYCLE FILL THAT STARTS 10/24/2024  Associated Diagnoses: Generalized pain      apixaban ANTICOAGULANT (ELIQUIS) 5 MG tablet Take 1 tablet (5 mg) by mouth 2 times daily      calcium polycarbophil (FIBERCON) 625 MG tablet Take 2 tablets by mouth daily       carbidopa-levodopa (SINEMET CR)  MG CR tablet TAKE 1 TABLET BY MOUTH EVERY EVENING  Qty: 90 tablet, Refills: 97    Associated Diagnoses: Parkinson's disease (H)      carbidopa-levodopa (SINEMET CR)  MG CR tablet TAKE 1 TABLET BY MOUTH AT BEDTIME  Qty: 90 tablet, Refills: 97    Associated Diagnoses: Parkinson's disease (H)      donepezil (ARICEPT) 10 MG tablet TAKE 1 TABLET BY MOUTH AT BEDTIME  Qty: 31 tablet, Refills: 11    Comments: CYCLE FILL REFILL REQUEST FOR CYCLE FILL THAT STARTS 10/24/2024  Associated Diagnoses: Parkinson's disease without dyskinesia, with fluctuating manifestations (H)      furosemide (LASIX) 20 MG tablet Take 1 tablet (20 mg) by mouth daily.  Qty: 30 tablet, Refills: 11    Associated Diagnoses: Pulmonary vascular congestion      hydrocortisone 2.5 % cream Apply topically every 6 hours as needed for other (hemorrhoids)      metroNIDAZOLE (METROGEL) 1 % external gel Apply topically daily. To forehead and face until resolved.  Avoid contact with eyes  Qty: 60 g, Refills: 4    Associated Diagnoses: Rosacea      nystatin (MYCOSTATIN) 440178 UNIT/GM external powder Apply topically 2 times daily Apply under breast twice daily for rash flare until healed. Use as directed with any rash flare. Ok to keep bedside.  Qty: 30 g, Refills: 11    Associated Diagnoses: Rash      SENEXON-S 8.6-50 MG tablet TAKE 1 TABLET BY MOUTH AT BEDTIME;AND TAKE ONE TABLET TWICE DAILY AS NEEDED  Qty: 270 tablet, Refills: 97    Comments: Please authorize quantity 90 day supply with prn refills for cycle. Thank you!  Associated Diagnoses: Slow transit constipation           Allergies   Allergies   Allergen Reactions    Other [Seasonal Allergies]      ragweed     Data   Results for orders placed or performed during the hospital encounter of 10/17/24   CT Cervical Spine w/o Contrast    Narrative    CT CERVICAL SPINE WITHOUT CONTRAST 10/17/2024 4:10 PM     HISTORY: fall neck pain     TECHNIQUE: Axial images of the  cervical spine were obtained without  intravenous contrast. Multiplanar reformations were performed.   Radiation dose for this scan was reduced using automated exposure  control, adjustment of the mA and/or kV according to patient size, or  iterative reconstruction technique.    COMPARISON: None.    FINDINGS: No evidence of fracture. No prevertebral soft tissue  swelling. Minimal grade 1 anterolisthesis of C6 on C7, C7 on T1, and  T2 on T3. Vertebral body height is maintained. No destructive osseous  lesion. Multiple level disc space height loss with associated endplate  osteophyte formation and facet arthropathy. Osseous ankylosis across  the C3-C5 disc spaces and posterior elements. Moderate/severe neural  foraminal narrowing throughout the cervical spine. No high-grade bony  canal stenosis.    Visualized paraspinous tissues: Two right thyroid nodules, the larger  of which measures up to 17 mm. Bilateral carotid bulb atherosclerotic  calcifications.      Impression    IMPRESSION:   1. No evidence of acute fracture or subluxation in the cervical spine.  2. Right thyroid nodules. Nonurgent dedicated thyroid ultrasound is  recommended for further evaluation.      ERICA HERRERA MD         SYSTEM ID:  [id number]   CT Chest/Abdomen/Pelvis w Contrast    Narrative    CT CHEST/ABDOMEN/PELVIS W CONTRAST 10/17/2024 4:11 PM    CLINICAL HISTORY: fall, anticoagulant use, neck pain    TECHNIQUE: CT scan of the chest, abdomen, and pelvis was performed  following injection of IV contrast. Multiplanar reformats were  obtained. Dose reduction techniques were used.   CONTRAST: 66mL Isovue 370    COMPARISON: CXR 8/27/2024, CT abdomen/pelvis 6/28/2024    FINDINGS:   LUNGS AND PLEURA: Mild bibasilar hypoventilatory changes without  evidence of pulmonary laceration or contusion. No pleural effusion or  pneumothorax.    MEDIASTINUM/AXILLAE: No evidence of acute injury. No suspicious  lymphadenopathy. Small hiatal hernia. Heart is at  upper limits of  normal in size. Focus of hypoattenuation in the left ventricular apex  (series 4 image 83).    CORONARY ARTERY CALCIFICATION: Moderate.    HEPATOBILIARY: Normal.    PANCREAS: Normal.    SPLEEN: Normal.    ADRENAL GLANDS: Normal.    KIDNEYS/BLADDER: No hydronephrosis. Urinary bladder is minimally  distended with some questionable wall thickening. Minimal surrounding  fat stranding.    BOWEL: Diverticulosis in the colon. No acute inflammatory change. No  obstruction. No mesenteric hematoma or pneumoperitoneum.    PELVIC ORGANS: Unremarkable.    ADDITIONAL FINDINGS: No suspicious lymphadenopathy or abdominal pelvic  free fluid. No evidence of significant peritoneal or retroperitoneal  hematoma.    MUSCULOSKELETAL: No acute bony abnormality. Right shoulder  arthroplasty partially visualized. Likely sequela of diffuse hepatic  skeletal hyperostosis (DISH) in the thoracic spine without definite  fracture but please see report from concurrent spine CT reformats for  additional findings.      Impression    IMPRESSION:  1.  No evidence of acute trauma in the chest, abdomen or pelvis.  2.  Questionable urinary bladder wall thickening, correlate with  urinalysis to exclude cystitis.  3.  Suggestion of hypoattenuation in the left ventricular apex, could  be seen in the setting of old infarct and/or small focus of thrombus.  Could consider further evaluation with echocardiogram if clinically  indicated.    SAMIR TUTTLE MD         SYSTEM ID:  [id number]   XR Shoulder Left G/E 3 Views    Narrative    EXAM: XR SHOULDER LEFT G/E 3 VIEWS  DATE/TIME: 10/17/2024 3:37 PM    INDICATION: fall paibn  COMPARISON: None available.       Impression    IMPRESSION: Mild degenerative arthrosis of the acromioclavicular and  glenohumeral joint. No acute fracture.    JANETH MATHEWS DO         SYSTEM ID:  [id number]   XR Femur Left 2 Views    Narrative    EXAM: XR FEMUR LEFT 2 VIEWS, XR TIBIA AND FIBULA LEFT 2 VIEWS  DATE/TIME:  10/17/2024 3:38 PM    INDICATION: fall pain  COMPARISON: None available.       Impression    IMPRESSION: Diffuse osseous demineralization. Mild degenerative  arthrosis of the left hip. Left total knee arthroplasty. No acute  fracture.       JANETH LITZY DO BISI         SYSTEM ID:  [id number]   XR Tibia and Fibula Left 2 Views    Narrative    EXAM: XR FEMUR LEFT 2 VIEWS, XR TIBIA AND FIBULA LEFT 2 VIEWS  DATE/TIME: 10/17/2024 3:38 PM    INDICATION: fall pain  COMPARISON: None available.       Impression    IMPRESSION: Diffuse osseous demineralization. Mild degenerative  arthrosis of the left hip. Left total knee arthroplasty. No acute  fracture.       JANETH MATHEWS DO         SYSTEM ID:  [id number]   CT Thoracic Spine w/o Contrast    Narrative    CT THORACIC SPINE WITHOUT CONTRAST   10/17/2024 4:10 PM     HISTORY: head injury     TECHNIQUE: Axial images of the thoracic spine were obtained without  intravenous contrast. Multiplanar reformations were performed.   Radiation dose for this scan was reduced using automated exposure  control, adjustment of the mA and/or kV according to patient size, or  iterative reconstruction technique.    COMPARISON: 6/25/2024 chest radiograph.    FINDINGS:  Diffuse osteopenia, which limits evaluation. Mildly  exaggerated thoracic kyphosis. Grade 1 anterolisthesis of T2 on T3 and  T3 on T4. Minimal thoracic levocurvature and thoracolumbar junction  dextrocurvature. No lucent fracture lines identified. No definite  vertebral body height loss. Osseous ankylosis across multiple disc  spaces. Extensive multilevel disc space height loss. Minimal  multilevel endplate osteophyte formation. Moderate facet arthropathy  at a few levels. Neural foraminal narrowing is greatest/severe at  T7-T8 bilaterally. No high-grade bony canal stenosis.    Minimal dependent atelectasis throughout both lung bases. Scattered  aortoiliac atherosclerotic calcifications. Paraspinal muscle atrophy.      Impression     IMPRESSION:   No evidence of acute fracture or subluxation in the  thoracic spine.    ERICA HERRERA MD         SYSTEM ID:  [id number]   CT Head w/o Contrast    Narrative    CT SCAN OF THE HEAD WITHOUT CONTRAST   10/17/2024 4:12 PM     HISTORY: fall, hit head    TECHNIQUE:  Axial images of the head and coronal reformations without  IV contrast material. Radiation dose for this scan was reduced using  automated exposure control, adjustment of the mA and/or kV according  to patient size, or iterative reconstruction technique.    COMPARISON: None.    FINDINGS: Wedge-shaped hypodensity and loss of gray-white  differentiation in the left occipital lobe. No definite acute  intracranial hemorrhage. No evidence of an intracranial mass. Mild to  moderate generalized parenchymal volume loss with associated ex vacuo  dilatation of the ventricles. Confluent periventricular and deep white  matter hypodensity, favored to reflect advanced sequela of chronic  microvascular ischemia.     The visualized portions of the sinuses and mastoids appear normal. The  bony calvarium and bones of the skull base appear intact. Large left  parieto-occipital scalp hematoma.      Impression    IMPRESSION:     1. Hypodensity and loss of gray-white differentiation in the left  occipital lobe, concerning for an acute/subacute infarct. MRI can be  performed for further evaluation.  2. No definite acute intracranial hemorrhage.  3. Diffuse parenchymal volume loss and white matter changes likely due  to chronic microvascular ischemic change.  4. Large left parieto-occipital scalp hematoma.      ERICA HERRERA MD         SYSTEM ID:  [id number]   MR Brain w/o & w Contrast    Narrative    EXAM: MRI head without and with contrast, MRA head without contrast, MRA neck without and with contrast.     LOCATION: [facility]  DATE: 10/18/2024    INDICATION: Acute infarction  COMPARISON: CT head 10/17/2024  CONTRAST: 10 mL  Gadavist  TECHNIQUE:   1) Routine multiplanar multisequence head MRI without and with intravenous contrast.  2) 3D time-of-flight head MRA without intravenous contrast.  3) Neck MRA without and with IV contrast. Stenosis measurements made according to NASCET criteria unless otherwise specified.    FINDINGS:  HEAD MRI:  INTRACRANIAL CONTENTS: Moderate acute/early subacute infarction left temporal occipital region extending into left parietal lobe. Additional multiple small patchy and punctate foci of acute/early subacute infarction in left frontal and parietal lobe.   Small amount of associated the petechial hemorrhage in the left temporal occipital region. No significant mass effect. Patchy and confluent nonspecific T2/FLAIR hyperintensities within the cerebral white matter most consistent with moderate chronic   microvascular ischemic change. Moderate generalized cerebral atrophy. No hydrocephalus. Normal position of the cerebellar tonsils. Small areas of patchy enhancement in the left temporal occipital region of ischemia; consistent with more subacute stage.    SELLA: No abnormality accounting for technique.    OSSEOUS STRUCTURES/SOFT TISSUES: Normal marrow signal. Asymmetrically diminished flow void in left petrous and cavernous ICA.     ORBITS: No abnormality accounting for technique.     SINUSES/MASTOIDS: No paranasal sinus mucosal disease. No middle ear or mastoid effusion.     HEAD MRA:   ANTERIOR CIRCULATION: Nonvisualization of petrous and cavernous ICA. The left ICA is reconstituted in the distal supraclinoid segment. The left M1 segment is asymmetrically less opacified compared with the right side. Moderate to marked narrowing left   A1. Mild to moderate areas of narrowing right A1. Short segment marked narrowing mid to distal right A2. Additional mild to moderate areas of narrowings throughout anterior circulation including short segment mild to moderate narrowing at the proximal   aspect of right M2.  Fetal origin right posterior cerebral artery.    POSTERIOR CIRCULATION: Mild to moderate narrowing proximal and mid right posterior communicating artery. Marked narrowing distal right P1. Short segment moderate to marked narrowing proximal right P2. Marked narrowing to occlusion of proximal left P2.   Paucity of more distal branches of left posterior circulation. Dominant left vertebral artery with moderate narrowing proximal basilar artery.     NECK MRA:   RIGHT CAROTID: Less than 50% narrowing.    LEFT CAROTID: Short segment marked narrowing proximal left ICA, just distal to bifurcation; narrowing is measured at over 90%. Mildly diminished flow in more distal left ICA compared with the right side.    VERTEBRAL ARTERIES: Short segment mild narrowing distal left vertebral artery. Smaller right vertebral artery is only segmentally visualized with high T1 signal in its proximal and mid aspect; this could be due to a dissection. Dominant left and smaller   right vertebral arteries.    AORTIC ARCH: Classic aortic arch anatomy with no significant stenosis at the origin of the great vessels.      Impression    IMPRESSION:  HEAD MRI:   1.  Moderate acute/early subacute infarction left temporal occipital region extending into left parietal lobe.   2.  Additional multiple small patchy and punctate foci of acute/early subacute infarction in left frontal and parietal lobe.   3.  Small amount of associated the petechial hemorrhage in the left temporal occipital region.   4.  No significant mass effect.   5.  Moderate chronic small vessel ischemia.   6.  Moderate generalized cerebral atrophy. No hydrocephalus.   7.  Small areas of patchy enhancement in the left temporal occipital region of ischemia; consistent with more subacute stage.    HEAD MRA:   1.  Nonvisualization of petrous and cavernous ICA. The left ICA is reconstituted in the distal supraclinoid segment.   2.  The left M1 segment is asymmetrically less opacified  compared with the right side.   3.  Moderate to marked narrowing left A1.   4.  Mild to moderate areas of narrowing right A1.   5.  Short segment marked narrowing mid to distal right A2.   6.  Additional mild to moderate areas of narrowings throughout anterior circulation including short segment mild to moderate narrowing at the proximal aspect of right M2.  7.  Mild to moderate narrowing proximal and mid right posterior communicating artery.   8.  Marked narrowing distal right P1.   9.  Short segment moderate to marked narrowing proximal right P2.   10.  Marked narrowing to occlusion of proximal left P2.   11.  Paucity of more distal branches of left posterior circulation.    NECK MRA:  1.  Short segment marked narrowing proximal left ICA, just distal to bifurcation; narrowing is measured at over 90%.   2.  Mildly diminished flow in more distal left ICA compared with the right side.  3.  No hemodynamically significant narrowing proximal right ICA.  4.  Short segment mild narrowing distal left vertebral artery. Smaller right vertebral artery is only segmentally visualized with high T1 signal in its proximal and mid aspect; this could be due to a dissection.   MRA Neck (Carotids) wo & w Contrast    Narrative    EXAM: MRI head without and with contrast, MRA head without contrast, MRA neck without and with contrast.     LOCATION: [facility]  DATE: 10/18/2024    INDICATION: Acute infarction  COMPARISON: CT head 10/17/2024  CONTRAST: 10 mL Gadavist  TECHNIQUE:   1) Routine multiplanar multisequence head MRI without and with intravenous contrast.  2) 3D time-of-flight head MRA without intravenous contrast.  3) Neck MRA without and with IV contrast. Stenosis measurements made according to NASCET criteria unless otherwise specified.    FINDINGS:  HEAD MRI:  INTRACRANIAL CONTENTS: Moderate acute/early subacute infarction left temporal occipital region extending into left parietal lobe. Additional  multiple small patchy and punctate foci of acute/early subacute infarction in left frontal and parietal lobe.   Small amount of associated the petechial hemorrhage in the left temporal occipital region. No significant mass effect. Patchy and confluent nonspecific T2/FLAIR hyperintensities within the cerebral white matter most consistent with moderate chronic   microvascular ischemic change. Moderate generalized cerebral atrophy. No hydrocephalus. Normal position of the cerebellar tonsils. Small areas of patchy enhancement in the left temporal occipital region of ischemia; consistent with more subacute stage.    SELLA: No abnormality accounting for technique.    OSSEOUS STRUCTURES/SOFT TISSUES: Normal marrow signal. Asymmetrically diminished flow void in left petrous and cavernous ICA.     ORBITS: No abnormality accounting for technique.     SINUSES/MASTOIDS: No paranasal sinus mucosal disease. No middle ear or mastoid effusion.     HEAD MRA:   ANTERIOR CIRCULATION: Nonvisualization of petrous and cavernous ICA. The left ICA is reconstituted in the distal supraclinoid segment. The left M1 segment is asymmetrically less opacified compared with the right side. Moderate to marked narrowing left   A1. Mild to moderate areas of narrowing right A1. Short segment marked narrowing mid to distal right A2. Additional mild to moderate areas of narrowings throughout anterior circulation including short segment mild to moderate narrowing at the proximal   aspect of right M2. Fetal origin right posterior cerebral artery.    POSTERIOR CIRCULATION: Mild to moderate narrowing proximal and mid right posterior communicating artery. Marked narrowing distal right P1. Short segment moderate to marked narrowing proximal right P2. Marked narrowing to occlusion of proximal left P2.   Paucity of more distal branches of left posterior circulation. Dominant left vertebral artery with moderate narrowing proximal basilar artery.     NECK MRA:    RIGHT CAROTID: Less than 50% narrowing.    LEFT CAROTID: Short segment marked narrowing proximal left ICA, just distal to bifurcation; narrowing is measured at over 90%. Mildly diminished flow in more distal left ICA compared with the right side.    VERTEBRAL ARTERIES: Short segment mild narrowing distal left vertebral artery. Smaller right vertebral artery is only segmentally visualized with high T1 signal in its proximal and mid aspect; this could be due to a dissection. Dominant left and smaller   right vertebral arteries.    AORTIC ARCH: Classic aortic arch anatomy with no significant stenosis at the origin of the great vessels.      Impression    IMPRESSION:  HEAD MRI:   1.  Moderate acute/early subacute infarction left temporal occipital region extending into left parietal lobe.   2.  Additional multiple small patchy and punctate foci of acute/early subacute infarction in left frontal and parietal lobe.   3.  Small amount of associated the petechial hemorrhage in the left temporal occipital region.   4.  No significant mass effect.   5.  Moderate chronic small vessel ischemia.   6.  Moderate generalized cerebral atrophy. No hydrocephalus.   7.  Small areas of patchy enhancement in the left temporal occipital region of ischemia; consistent with more subacute stage.    HEAD MRA:   1.  Nonvisualization of petrous and cavernous ICA. The left ICA is reconstituted in the distal supraclinoid segment.   2.  The left M1 segment is asymmetrically less opacified compared with the right side.   3.  Moderate to marked narrowing left A1.   4.  Mild to moderate areas of narrowing right A1.   5.  Short segment marked narrowing mid to distal right A2.   6.  Additional mild to moderate areas of narrowings throughout anterior circulation including short segment mild to moderate narrowing at the proximal aspect of right M2.  7.  Mild to moderate narrowing proximal and mid right posterior communicating artery.   8.  Marked  narrowing distal right P1.   9.  Short segment moderate to marked narrowing proximal right P2.   10.  Marked narrowing to occlusion of proximal left P2.   11.  Paucity of more distal branches of left posterior circulation.    NECK MRA:  1.  Short segment marked narrowing proximal left ICA, just distal to bifurcation; narrowing is measured at over 90%.   2.  Mildly diminished flow in more distal left ICA compared with the right side.  3.  No hemodynamically significant narrowing proximal right ICA.  4.  Short segment mild narrowing distal left vertebral artery. Smaller right vertebral artery is only segmentally visualized with high T1 signal in its proximal and mid aspect; this could be due to a dissection.   MRA Brain ([place] [facility] [place]) wo Contrast    Narrative    EXAM: MRI head without and with contrast, MRA head without contrast, MRA neck without and with contrast.     LOCATION: [facility]  DATE: 10/18/2024    INDICATION: Acute infarction  COMPARISON: CT head 10/17/2024  CONTRAST: 10 mL Gadavist  TECHNIQUE:   1) Routine multiplanar multisequence head MRI without and with intravenous contrast.  2) 3D time-of-flight head MRA without intravenous contrast.  3) Neck MRA without and with IV contrast. Stenosis measurements made according to NASCET criteria unless otherwise specified.    FINDINGS:  HEAD MRI:  INTRACRANIAL CONTENTS: Moderate acute/early subacute infarction left temporal occipital region extending into left parietal lobe. Additional multiple small patchy and punctate foci of acute/early subacute infarction in left frontal and parietal lobe.   Small amount of associated the petechial hemorrhage in the left temporal occipital region. No significant mass effect. Patchy and confluent nonspecific T2/FLAIR hyperintensities within the cerebral white matter most consistent with moderate chronic   microvascular ischemic change. Moderate generalized cerebral atrophy. No hydrocephalus. Normal  position of the cerebellar tonsils. Small areas of patchy enhancement in the left temporal occipital region of ischemia; consistent with more subacute stage.    SELLA: No abnormality accounting for technique.    OSSEOUS STRUCTURES/SOFT TISSUES: Normal marrow signal. Asymmetrically diminished flow void in left petrous and cavernous ICA.     ORBITS: No abnormality accounting for technique.     SINUSES/MASTOIDS: No paranasal sinus mucosal disease. No middle ear or mastoid effusion.     HEAD MRA:   ANTERIOR CIRCULATION: Nonvisualization of petrous and cavernous ICA. The left ICA is reconstituted in the distal supraclinoid segment. The left M1 segment is asymmetrically less opacified compared with the right side. Moderate to marked narrowing left   A1. Mild to moderate areas of narrowing right A1. Short segment marked narrowing mid to distal right A2. Additional mild to moderate areas of narrowings throughout anterior circulation including short segment mild to moderate narrowing at the proximal   aspect of right M2. Fetal origin right posterior cerebral artery.    POSTERIOR CIRCULATION: Mild to moderate narrowing proximal and mid right posterior communicating artery. Marked narrowing distal right P1. Short segment moderate to marked narrowing proximal right P2. Marked narrowing to occlusion of proximal left P2.   Paucity of more distal branches of left posterior circulation. Dominant left vertebral artery with moderate narrowing proximal basilar artery.     NECK MRA:   RIGHT CAROTID: Less than 50% narrowing.    LEFT CAROTID: Short segment marked narrowing proximal left ICA, just distal to bifurcation; narrowing is measured at over 90%. Mildly diminished flow in more distal left ICA compared with the right side.    VERTEBRAL ARTERIES: Short segment mild narrowing distal left vertebral artery. Smaller right vertebral artery is only segmentally visualized with high T1 signal in its proximal and mid aspect; this could be  due to a dissection. Dominant left and smaller   right vertebral arteries.    AORTIC ARCH: Classic aortic arch anatomy with no significant stenosis at the origin of the great vessels.      Impression    IMPRESSION:  HEAD MRI:   1.  Moderate acute/early subacute infarction left temporal occipital region extending into left parietal lobe.   2.  Additional multiple small patchy and punctate foci of acute/early subacute infarction in left frontal and parietal lobe.   3.  Small amount of associated the petechial hemorrhage in the left temporal occipital region.   4.  No significant mass effect.   5.  Moderate chronic small vessel ischemia.   6.  Moderate generalized cerebral atrophy. No hydrocephalus.   7.  Small areas of patchy enhancement in the left temporal occipital region of ischemia; consistent with more subacute stage.    HEAD MRA:   1.  Nonvisualization of petrous and cavernous ICA. The left ICA is reconstituted in the distal supraclinoid segment.   2.  The left M1 segment is asymmetrically less opacified compared with the right side.   3.  Moderate to marked narrowing left A1.   4.  Mild to moderate areas of narrowing right A1.   5.  Short segment marked narrowing mid to distal right A2.   6.  Additional mild to moderate areas of narrowings throughout anterior circulation including short segment mild to moderate narrowing at the proximal aspect of right M2.  7.  Mild to moderate narrowing proximal and mid right posterior communicating artery.   8.  Marked narrowing distal right P1.   9.  Short segment moderate to marked narrowing proximal right P2.   10.  Marked narrowing to occlusion of proximal left P2.   11.  Paucity of more distal branches of left posterior circulation.    NECK MRA:  1.  Short segment marked narrowing proximal left ICA, just distal to bifurcation; narrowing is measured at over 90%.   2.  Mildly diminished flow in more distal left ICA compared with the right side.  3.  No hemodynamically  significant narrowing proximal right ICA.  4.  Short segment mild narrowing distal left vertebral artery. Smaller right vertebral artery is only segmentally visualized with high T1 signal in its proximal and mid aspect; this could be due to a dissection.   CTA Head Neck with Contrast    Narrative    EXAM: CTA HEAD NECK W CONTRAST  LOCATION: [facility]  DATE: 10/19/2024    INDICATION: furthere evaluate multifocal stenosis on MRA  COMPARISON: MRA from yesterday.  CONTRAST: 67mL Isovue 370  TECHNIQUE: Head and neck CT angiogram with IV contrast. Noncontrast head CT followed by axial helical CT images of the head and neck vessels obtained during the arterial phase of intravenous contrast administration. Axial 2D reconstructed images and   multiplanar 3D MIP reconstructed images of the head and neck vessels were performed by the technologist. Dose reduction techniques were used. All stenosis measurements made according to NASCET criteria unless otherwise specified.    FINDINGS:   HEAD CTA:  ANTERIOR CIRCULATION: Patent bilateral ICAs. Calcified atherosclerotic plaques in bilateral carotid siphons bones with mild stenosis in bilateral clinoid ICAs. Multifocal mild stenosis in the M1 segment of the right MCA. Multifocal mild stenosis in the   A1 segment and focal moderate stenosis in the A2 segment of the right EDWIGE. No stenosis/occlusion, aneurysm, or high flow vascular malformation. Standard [place] anatomy.    POSTERIOR CIRCULATION: Moderate to severe focal stenosis in the P1 segment of the left PCA with diffuse moderate to severe stenosis beyond P2 segment. Multifocal mild stenosis in the right PCA. Multifocal mild stenosis in the basilar artery. Retrograde   attenuated contrast filling in the V4 segment of the right vertebral artery.     DURAL VENOUS SINUSES: Expected enhancement of the major dural venous sinuses.    NECK CTA:  RIGHT CAROTID: Calcified atherosclerotic plaques at the  bifurcation without significant stenosis.    LEFT CAROTID: Greater than 90% stenosis of the right proximal ICA at the bifurcation secondary to likely ulcerated plaque.    VERTEBRAL ARTERIES: Occlusion of the right vertebral artery from the origin with attenuated contrast filling in the distal V2, V3 and V4 segment. Balanced vertebral arteries.    AORTIC ARCH: Classic aortic arch anatomy with no significant stenosis at the origin of the great vessels.    NONVASCULAR STRUCTURES: Unremarkable.      Impression    IMPRESSION:   HEAD CTA:   1.  Retrograde contrast filling in the V4 segment of the right vertebral artery. The remaining major intracranial arteries are patent.  2.   Focal moderate stenosis in the A2 segment of the right EDWIGE.  3.  Moderate to severe focal stenosis in the P1 segment of the left PCA with diffuse moderate stenosis beyond P2 segment.  4.  Multifocal mild stenosis in the anterior and posterior circulation as detailed above.    NECK CTA:  1.  Greater than 90% stenosis of the left ICA at the bifurcation secondary to hypodense, likely ulcerated plaque.  2.  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   US Carotid Bilateral    Narrative    EXAM: US CAROTID BILATERAL  LOCATION: [facility]  DATE: 10/19/2024    INDICATION: evaluate degree of L ICA stenosis; abnormal CTA  COMPARISON: CTA dated 10/19/2024  TECHNIQUE: Duplex exam performed utilizing 2D gray-scale imaging, Doppler interrogation with color-flow and spectral waveform analysis. The percent diameter stenosis is determined using Updated Recommendations for Carotid Stenosis Interpretation Criteria   from [employer] [facility].    FINDINGS:    RIGHT: Minimal plaque at the bifurcation. The peak systolic velocity in the ICA is less than 180 cm/sec, consistent with less than 50% stenosis. Normal velocities in the ECA. Antegrade flow within the vertebral  artery.     LEFT: Significant thrombus within the proximal left ICA resulting in marked narrowing. Or distally, the left ICA is occluded.  Normal velocities in the ECA. Antegrade flow within the vertebral artery.    VELOCITY CHART:  CCA   Right: 131 cm/s   Left: 64 cm/s  ICA   Right: 90 cm/s   Left: Occluded  ECA   Right: 88 cm/s   Left: 97 cm/s  ICA/CCA PSV Ratio   Right: 1.1   Left: Not applicable      Impression    IMPRESSION:  1.  Mild plaque formation, velocities consistent with less than 50% stenosis in the right internal carotid artery.  2.  Occluded left ICA present (new compared to recent CTA)..  3.  Flow within the vertebral arteries is antegrade.    Critical Result: Acute left ICA occlusion    Finding was identified on 10/19/2024 5:36 PM CDT.    Raeann May RN was contacted by me on 10/19/2024 5:36 PM / 6:08PM CDT and verbalized understanding of the critical result.      CT Head w/o Contrast    Narrative    EXAM: CT HEAD W/O CONTRAST  LOCATION: [facility]  DATE: 10/20/2024    INDICATION: recent stroke, on heparin. evaluate for hemorrhagic transformation.  COMPARISON: None.  TECHNIQUE: Routine CT Head without IV contrast. Multiplanar reformats. Dose reduction techniques were used.    FINDINGS:  INTRACRANIAL CONTENTS: Continued evolution of subacute infarct within the left occipital lobe. There is likely petechial hemorrhage within the region of infarct. No chante lobar hemorrhage. No other evidence of acute intracranial hemorrhage or mass   effect. Scattered foci of decreased attenuation within the cerebral hemispheric white matter which are nonspecific, though most commonly ascribed to chronic small vessel ischemic disease. The ventricles and sulci are prominent consistent with moderate   brain parenchymal volume loss. Gray-white matter differentiation is otherwise maintained. The basilar cisterns are patent.    VISUALIZED ORBITS/SINUSES/MASTOIDS: The globes are unremarkable.  The partially imaged paranasal sinuses, mastoid air cells and middle ear cavities are unremarkable.     BONES/SOFT TISSUES: The visualized skull base and calvarium are unremarkable.      Impression    IMPRESSION:    1.  Continued evolution of subacute infarct within the left occipital lobe. There is likely petechial hemorrhage within the region of infarct. No chante lobar hemorrhage.   2.  No other evidence of acute intracranial hemorrhage or mass effect.   Echocardiogram Complete - For age > 60 yrs     Value    LVEF  60-65%    Narrative    [id number]  [id number]  [id number]  [id number]^AMY^TOM     [facility]  Echocardiography Laboratory  [address] [place] [address]     Name: FLAQUITO PALAFOX  MRN: [id number]  : 1940  Study Date: 10/18/2024 11:34 AM  Age: 84 yrs  Gender: Female  Patient Location: [facility]  Reason For Study: Cerebrovascular Incident  Ordering Physician: TOM REYES  Performed By: Charu Hays     BSA: 1.8 m2  Height: 65 in  Weight: 168 lb  HR: 70  BP: 135/76 mmHg  ______________________________________________________________________________  Procedure  Complete Portable Echo Adult. Definity (NDC #62414-859) given intravenously.  Contrast Definity.  ______________________________________________________________________________  Interpretation Summary     The left ventricle is normal in size.  There is moderate concentric left ventricular hypertrophy.  Left ventricular systolic function is normal. The visual ejection fraction is  60-65%.  Normal left ventricular wall motion  Diastolic Doppler findings (E/E' ratio and/or other parameters) suggest left  ventricular filling pressures are increased.  There is mild (1+) tricuspid regurgitation.  Sinus rhythm was noted.  Compared to prior study, there is no significant change.  ______________________________________________________________________________  Left Ventricle  The left ventricle is normal in size. There is  moderate concentric left  ventricular hypertrophy. Left ventricular systolic function is normal. The  visual ejection fraction is 60-65%. Diastolic Doppler findings (E/E' ratio  and/or other parameters) suggest left ventricular filling pressures are  increased. Normal left ventricular wall motion.     Right Ventricle  The right ventricle is normal in structure, function and size.     Atria  Normal left atrial size. Right atrial size is normal. There is no atrial shunt  seen.     Mitral Valve  There is moderate mitral annular calcification. There is trace mitral  regurgitation.     Tricuspid Valve  The tricuspid valve is normal in structure and function. There is mild (1+)  tricuspid regurgitation. The right ventricular systolic pressure is  approximated at 24.9 mmHg plus the right atrial pressure. IVC diameter <2.1 cm  collapsing >50% with sniff suggests a normal RA pressure of 3 mmHg. Right  ventricle systolic pressure estimate normal.     Aortic Valve  The aortic valve is trileaflet with aortic valve sclerosis. No aortic  regurgitation is present. No aortic stenosis is present.     Pulmonic Valve  The pulmonic valve is not well seen, but is grossly normal. There is trace  pulmonic valvular regurgitation.     Vessels  Normal size aorta.     Pericardium  There is no pericardial effusion.     Rhythm  Sinus rhythm was noted.  ______________________________________________________________________________  MMode/2D Measurements & Calculations     IVSd: 1.4 cm  LVIDd: 3.5 cm  LVIDs: 2.3 cm  LVPWd: 1.2 cm  FS: 36.0 %  LV mass(C)d: 152.7 grams  LV mass(C)dI: 83.1 grams/m2  Ao root diam: 3.2 cm  asc Aorta Diam: 3.4 cm  LVOT diam: 2.1 cm  LVOT area: 3.5 cm2  Ao root diam index Ht(cm/m): 1.9  Ao root diam index BSA (cm/m2): 1.8  Asc Ao diam index BSA (cm/m2): 1.9  Asc Ao diam index Ht(cm/m): 2.1  RWT: 0.66  TAPSE: 2.5 cm     Doppler Measurements & Calculations  MV E max marlene: 80.7 cm/sec  MV A max marlene: 91.2 cm/sec  MV E/A:  0.89     MV dec slope: 314.0 cm/sec2  MV dec time: 0.26 sec  Ao V2 max: 139.2 cm/sec  Ao max P.0 mmHg  Ao V2 mean: 97.7 cm/sec  Ao mean P.3 mmHg  Ao V2 VTI: 27.5 cm  VIVEK(I,D): 2.8 cm2  VIVEK(V,D): 2.6 cm2  LV V1 max P.2 mmHg  LV V1 max: 102.6 cm/sec  LV V1 VTI: 22.5 cm  SV(LVOT): 78.2 ml  SI(LVOT): 42.6 ml/m2  PA acc time: 0.13 sec  TR max vlad: 249.5 cm/sec  TR max P.9 mmHg  AV Vlad Ratio (DI): 0.74  VIVEK Index (cm2/m2): 1.5  E/E' av.6  Lateral E/e': 18.5  Medial E/e': 20.6  RV S Vlad: 22.5 cm/sec     ______________________________________________________________________________  Report approved by: Christina Rizvi 10/18/2024 01:08 PM           Most Recent 3 CBC's:  Recent Labs   Lab Test 10/19/24  1409 10/18/24  0718 10/17/24  1312   WBC 7.9 6.3 9.3   HGB 12.2 12.2 13.1   MCV 91 91 91    226 244     Most Recent 3 BMP's:  Recent Labs   Lab Test 10/19/24  1957 10/18/24  2048 10/18/24  1322 10/18/24  0734 10/18/24  0718 10/17/24  2136 10/17/24  1312 24  0904   NA  --   --   --   --  139  --  136 139   POTASSIUM  --   --   --   --  4.1  --  4.1 4.0   CHLORIDE  --   --   --   --  102  --  99 103   CO2  --   --   --   --  28  --  25 21*   BUN  --   --   --   --  12.7  --  12.2 10.6   CR  --   --   --   --  0.66  --  0.52 0.45*   ANIONGAP  --   --   --   --  9  --  12 15   SHANNON  --   --   --   --  9.6  --  9.7 9.8   * 158* 103*   < > 103*   < > 110* 115*    < > = values in this interval not displayed.     Most Recent 2 LFT's:  Recent Labs   Lab Test 24  0449 24  0844   AST 13 12   ALT 10 <5   ALKPHOS 71 63   BILITOTAL 0.8 0.3     Most Recent Cholesterol Panel:  Recent Labs   Lab Test 10/17/24  1312   CHOL 204*   *   HDL 47*   TRIG 231*     7-Day Micro Results       Collected Updated Procedure Result Status      10/17/2024 1741 10/20/2024 1055 Urine Culture [[id number]]    (Abnormal)   Urine, Catheter    Final result Component Value   Culture >100,000 CFU/mL  Klebsiella pneumoniae    >100,000 CFU/mL Klebsiella pneumoniae        Susceptibility        Klebsiella pneumoniae (1)      GULSHAN      Ampicillin  Resistant  [1]       Ampicillin/ Sulbactam 4 ug/mL Susceptible      Cefazolin <=4 ug/mL Susceptible  [2]       Cefepime <=1 ug/mL Susceptible      Cefoxitin <=4 ug/mL Susceptible      Ceftazidime <=1 ug/mL Susceptible      Ceftriaxone <=1 ug/mL Susceptible      Ciprofloxacin <=0.25 ug/mL Susceptible      Gentamicin <=1 ug/mL Susceptible      Levofloxacin <=0.12 ug/mL Susceptible      Nitrofurantoin 64 ug/mL Intermediate      Piperacillin/Tazobactam <=4 ug/mL Susceptible      Tobramycin <=1 ug/mL Susceptible      Trimethoprim/Sulfamethoxazole <=1/19 ug/mL Susceptible                   [1]  Intrinsically Resistant     [2]  Cefazolin GULSHAN breakpoints are for the treatment of uncomplicated urinary tract infections. For the treatment of systemic infections, please contact the laboratory for additional testing.               Susceptibility        Klebsiella pneumoniae (2)      GUSLHAN      Ampicillin  Resistant  [1]       Ampicillin/ Sulbactam 4 ug/mL Susceptible      Cefazolin <=4 ug/mL Susceptible  [2]       Cefepime <=1 ug/mL Susceptible      Cefoxitin <=4 ug/mL Susceptible      Ceftazidime <=1 ug/mL Susceptible      Ceftriaxone <=1 ug/mL Susceptible      Ciprofloxacin <=0.25 ug/mL Susceptible      Gentamicin <=1 ug/mL Susceptible      Levofloxacin <=0.12 ug/mL Susceptible      Nitrofurantoin 64 ug/mL Intermediate      Piperacillin/Tazobactam <=4 ug/mL Susceptible      Tobramycin <=1 ug/mL Susceptible      Trimethoprim/Sulfamethoxazole <=1/19 ug/mL Susceptible                   [1]  Intrinsically Resistant     [2]  Cefazolin GULSHAN breakpoints are for the treatment of uncomplicated urinary tract infections. For the treatment of systemic infections, please contact the laboratory for additional testing.                          Most Recent Hemoglobin A1c:  Recent Labs   Lab Test  10/17/24  2056   A1C 6.0*

## 2024-10-21 NOTE — PLAN OF CARE
"Speech Language Therapy Discharge Summary    Reason for therapy discharge:    Discharged to SN[facility]=F    Progress towards therapy goal(s). See goals on Care Plan in [facility] electronic health record for goal details.  Goals not met.  Barriers to achieving goals:   discharge from facility.    Therapy recommendation(s):    Continued therapy is recommended.  Rationale/Recommendations:  dysphagia follow up, ?baseline = regular thin?.    Per last SLP recs: \"Soft/bite sized solids, thin liquids with direct assist (set up, may need hand over hand but allow to self-feed), slow rate, single bites/sips, liquid wash PRN. Encourage up to chair for meals.\"    *Pt not seen by discharging therapist on this date, note written based on previous treating therapist's notes and recommendations     "

## 2024-10-21 NOTE — PLAN OF CARE
Pt here with R Thalmic CVA. Alert to self. Neuros confused, R field cut, and generalized weakness. Bilateral upper extremities 4/5 and bilateral lower extremities 3/5. VSS, SBP<180. Tele ***. Soft and bite size diet, thin liquids. Takes pills crushed in applesauce. Up with Ax2 lift. Denies pain. Pt scoring green on the Aggression Stop Light Tool. Discharge back to [facility] via wheelchair between [id number].

## 2024-10-21 NOTE — PLAN OF CARE
Reason for Admission: L CVA, L ICA stenosis, fall with scalp hematoma     Cognitive/Mentation: A/Ox 1-2 (disoriented to place, time, situation)  Neuros/CMS: Intact ex R field cut, generalized weakness, BLE 3/5, difficult to do assessment as pt doesn't follow many commands.    VS: stable.  BP's more stable today  Tele: NSR.    /GI: Incontinent.  Purewick placed/changed overnight. Last BM PTA.   Pulmonary: LS clear.  Pain: denies.      Drains/Lines: L PIV with Heparin infusing @ 850 units/hr with 60mL FWF q4 hours    Skin: kodi, dry, flaky. Scattered bruising, hematoma occipital scalp  Activity: Assist x 2 with lift.  Diet: soft bite sized with thin liquids. Takes pills crushed in pudding.  Assisted feeding.      Therapies recs: PT recommending TCU, but family has stated they would prefer to have pt discharge back to memory care  Discharge: pending     Aggression Stoplight Tool: green     End of shift summary: Next PTT lab draw is tomorrow am (10/22/24).

## 2024-10-22 ENCOUNTER — PATIENT OUTREACH (OUTPATIENT)
Dept: CARE COORDINATION | Facility: CLINIC | Age: 84
End: 2024-10-22
Payer: COMMERCIAL

## 2024-10-22 NOTE — PROGRESS NOTES
[facility]    Background: Transitional Care Management program identified per system criteria and reviewed by [facility] team for possible outreach.    Assessment: Upon chart review, [facility] Team member will not proceed with patient outreach related to this episode of Transitional Care Management program due to reason below:    Patient has discharged to a Memory Care, Long-term Care, Assisted Living or Group Home where patient is receiving on-site support with their daily cares, including support with hospital follow up plan.    Plan: Transitional Care Management episode addressed appropriately per reason noted above.          RUBI Rae  [phone]  [facility]

## 2024-10-23 ENCOUNTER — MYC MEDICAL ADVICE (OUTPATIENT)
Dept: PHARMACY | Facility: CLINIC | Age: 84
End: 2024-10-23
Payer: COMMERCIAL

## 2024-10-23 PROCEDURE — G0179 MD RECERTIFICATION HHA PT: HCPCS | Performed by: NURSE PRACTITIONER

## 2024-10-24 ENCOUNTER — TELEPHONE (OUTPATIENT)
Dept: NEUROLOGY | Facility: CLINIC | Age: 84
End: 2024-10-24
Payer: COMMERCIAL

## 2024-10-24 NOTE — TELEPHONE ENCOUNTER
[facility] Call Center    Phone Message    May a detailed message be left on voicemail: yes     Reason for Call: Pts son Philip requesting an urgent call back to discuss a recent stroke.    Please call Philip at [phone] to discuss further.    Action Taken: Message routed to:  Other: [facility]    Travel Screening: Not Applicable     Date of Service:

## 2024-10-24 NOTE — TELEPHONE ENCOUNTER
Jana Lin ([phone]):    She is en route to the airport to come be with her family.     They are planning on having a care meeting tomorrow at 11:30am and hospice will be present. They are really hoping that they could have a conversation with me between now and     Jennifer is currently living in memory care. Her son is worried that she may have had another stroke between the memory care unit and discharge as she seems worse. She is very slumped over and can only say yes/no.     She believes that Philip is concerned that Jennifer is dying. Jana is her power of  and health care decision maker.     They report that Jennifer is very firm on her DNR/DNI. They were never contacted by a surgery team for endovascular intervention as they felt that this was unlikely to be reasonable.     Her daughter states that even prior to these events, Jennifer was not living in a way consistent with goals. Jennifer is now requiring a yajaira lift since her stroke. Prior to her stroke, OT had been concerned that Jennifer had been only been able to walk 200 ft whereas previously she had been able to walk 600 ft. Jana wonders if Jennifer may have had a stroke prior to the one that resulted in her hospitalization.    Discussed that based on Jennifer's previously expressed goals of care, hospice seems like a reasonable consideration.     Advised that a sudden worsening of symptoms is concerning for another medical cause. I would expect symptoms of a stroke to gradually improve following a stroke (although not necessarily back to baseline) and Parkinson's disease is very slowly progressive. If there has been an abrupt change in symptoms is concerning another process. This could be recurrent stroke given her ICA stenosis. Acute stroke also causes risk of seizures. It is also possible that she has an underlying infection, metabolic derangement or other medical illness. If it is within [facility]'s goals of care to treat these potential causes,  they may want to consider additional medical treatment.     Jana states she thinks they would want to treat an infection if one was there. Jana is concerned that Jennifer has had recurrent UTIs.     I will try to touch base with Philip Bates and Danilo during a break in tomorrow morning schedule per Jana's preference.     They are supposed to make an appointment with stroke clinic 6-8 weeks after the stroke. I do not see anything scheduled at this time. Jana will talk to Philip about this.     Tanvi Lindquist MD  Movement Disorders Fellow

## 2024-10-25 NOTE — TELEPHONE ENCOUNTER
Called Philip's number ([phone]):     Danilo and Velma are also on the call. They have a care meeting about their mother with [facility] in a couple hours.     Danilo and Philip saw Jennifer yesterday for an hour or so. They report that she has significant weakness on the right side of her body and has been minimally verbal.     Their understanding from calls from [facility] is that Jennifer fell after standing up from her wheelchair after eating lunch. Staff does not know why she stood from her wheelchair when this occurred. She fell and was found to have a stroke when she went to the hospital for evaluation of this. Advised that it is difficult to know what occurred first, although on my read it looks like the stroke likely happened around the time of the fall.     Philip reports that she had seemed less coherent after she was discharged on Monday than when he saw her on Sunday. Danilo reports that he spoke to her over the phone on Friday/Saturday in the hospital and she wasn't able to talk much when he saw her yesterday.     Her speech has seemed to have gotten worse. Family is not sure who they should be reaching out to to discuss these concerns.     I advised that stroke symptoms are expected to be worst within the first 5 days of the stroke. I would not necessarily expect them to continue to progress or worsen after edema is resolving. If her symptoms are significantly worse, I would be concerned about another process going on (stroke expansion, seizure, infection or other underlying medical illness). Advised that neurological symptoms in general can worsen with any physical stressors on the body. I had discussed this with her daughter, Jana yesterday as well.         Ms. Venegas doesn't have a primary care doctor right now. There is medical staff through her facility.     Family is wondering if someone could do a follow up at her facility. I strongly encouraged them to ask for the facility medical providers to evaluate  Jennifer. Agree that she should be seen sooner rather than later.    Provided number for neurology clinic and advised to ask for vascular/stroke neurology clinic. As I do not see follow up with them scheduled.     Advised that Parkinson's disease is slowly progressive and not likely to be contributing significantly. I would not abruptly withdraw Carbidopa/Levodopa.     Messaged Stroke CNP who saw Ms. Venegas in the hospital to see if vascular team could reach out to discuss these concerns with family as they are largely related to her recent stroke.     Tanvi Lindquist MD  Movement Disorders Fellow

## 2024-10-25 NOTE — TELEPHONE ENCOUNTER
Called Philip.     He stated that his sister was able to get a hold of Dr. Lindquist, and they are having a phone conference today at 10 AM.     Upon further review of chart Dr. Lindquist spoke to Jana yesterday.  See 10/23 myChart encounter.     Philip requested that Dr. Lindquist call his phone @ [phone].    He will be with the rest of the family.  They may be in the car and he can put it on speaker.     Routing to Dr. Lindquist as RICHARD Finnegan, RN, BSN  [facility]

## 2024-10-25 NOTE — TELEPHONE ENCOUNTER
Called MONICA Palacios on file.     Relayed Daisy's recommendation to return to ED for worsening symptoms.   He verbalized understanding.     They are unsure what their plan of care is for patient, he said he is going to discuss further with the family and will call back to clinic if needed.     KIM Finnegan, BSN  [facility]

## 2024-10-25 NOTE — TELEPHONE ENCOUNTER
Stroke MA to review Monday for ER visit or Hospice care and check with family to offer Stroke KENTRELL follow up. Ok'd to use return stroke slot per provider.  Hold placed with Daisy Castellanos on 12/6/24 at 11 AM if needed.     MANNIE OCHOA, CMA

## 2024-10-25 NOTE — TELEPHONE ENCOUNTER
Reviewed encounter. Per notes, Jennifer has had worsening speech and right sided weakness since discharge. She has known L ICA occlusion and was discharged on Eliquis + aspirin. Per notes, family was also meeting with hospice at her care facility. If within goals of care, she should be brought to the ED for further evaluation of her worsening neurologic symptoms.     NAREN Jacinto, CNP  Neurology  10/25/2024 1:46 PM

## 2024-10-28 DIAGNOSIS — I63.9 ISCHEMIC STROKE (H): ICD-10-CM

## 2024-10-28 DIAGNOSIS — G20.A2 PARKINSON'S DISEASE WITHOUT DYSKINESIA, WITH FLUCTUATING MANIFESTATIONS (H): ICD-10-CM

## 2024-10-28 RX ORDER — ATORVASTATIN CALCIUM 40 MG/1
40 TABLET, FILM COATED ORAL EVERY EVENING
Qty: 90 TABLET | Refills: 3 | Status: SHIPPED | OUTPATIENT
Start: 2024-10-28

## 2024-10-28 RX ORDER — CARBIDOPA/LEVODOPA 25MG-250MG
TABLET ORAL
Qty: 360 TABLET | Refills: 3 | Status: SHIPPED | OUTPATIENT
Start: 2024-10-28 | End: 2024-11-01

## 2024-10-28 NOTE — TELEPHONE ENCOUNTER
Chart reviewed and care everywhere updated. Does not appear pt was brought to the ED over the weekend.     Called halfway to inquire on her status and was asked to provide a callback number and they will contact me regarding an update on the pt.      Bri MENDEZ, RN, SCRN  RN Stroke Neurology Care Coordinator  [facility]science Service Line

## 2024-10-28 NOTE — TELEPHONE ENCOUNTER
Received return call from [facility] regarding pt status. She confirmed worsening in pt functional status on Friday, but that pt was not brought to the ED as it was not felt to be necessary per their care team. Nurse stated that she had previously been a 1A, but is now requiring 2A with a yajaira. She states that overall the pt is stable and will be evaluated with house NP Wednesday and then will have PT and OT evals this week as well. I told her that our team will then follow up at the end of this week to assess those evaluations and can schedule OP stroke follow up at that time.      Bri Salinas BS, RN, SCRN  RN Stroke Neurology Care Coordinator  [facility] Neuroscience Service Line

## 2024-10-30 ENCOUNTER — ASSISTED LIVING VISIT (OUTPATIENT)
Dept: GERIATRICS | Facility: CLINIC | Age: 84
End: 2024-10-30
Payer: COMMERCIAL

## 2024-10-30 DIAGNOSIS — R41.4 HEMI-NEGLECT OF RIGHT SIDE: ICD-10-CM

## 2024-10-30 DIAGNOSIS — Z71.89 ENCOUNTER FOR HOSPICE CARE DISCUSSION: ICD-10-CM

## 2024-10-30 DIAGNOSIS — G20.A2 PARKINSON'S DISEASE WITHOUT DYSKINESIA, WITH FLUCTUATING MANIFESTATIONS (H): ICD-10-CM

## 2024-10-30 DIAGNOSIS — M62.81 GENERALIZED MUSCLE WEAKNESS: ICD-10-CM

## 2024-10-30 DIAGNOSIS — R29.6 FALLS FREQUENTLY: ICD-10-CM

## 2024-10-30 DIAGNOSIS — I63.9 ISCHEMIC STROKE (H): Primary | ICD-10-CM

## 2024-10-30 DIAGNOSIS — I65.22 STENOSIS OF LEFT CAROTID ARTERY: ICD-10-CM

## 2024-10-30 DIAGNOSIS — G20.B2 PARKINSON'S DISEASE WITH DYSKINESIA AND FLUCTUATING MANIFESTATIONS (H): ICD-10-CM

## 2024-10-30 DIAGNOSIS — I48.0 PAROXYSMAL ATRIAL FIBRILLATION (H): ICD-10-CM

## 2024-10-30 DIAGNOSIS — F02.B0 MODERATE DEMENTIA ASSOCIATED WITH OTHER UNDERLYING DISEASE, WITHOUT BEHAVIORAL DISTURBANCE, PSYCHOTIC DISTURBANCE, MOOD DISTURBANCE, OR ANXIETY (H): ICD-10-CM

## 2024-10-30 PROCEDURE — 99358 PROLONG SERVICE W/O CONTACT: CPT | Performed by: NURSE PRACTITIONER

## 2024-10-30 PROCEDURE — 99349 HOME/RES VST EST MOD MDM 40: CPT | Performed by: NURSE PRACTITIONER

## 2024-10-30 NOTE — LETTER
10/30/2024      Jennifer Venegas  C/o Jana Griesbach  [address] [address]        [place] [facility]  [facility] Record Number:  [id number]  Place of Service where encounter took place:  [facility] [[id number]]  Chief Complaint   Patient presents with     RECHECK       HPI:    Jennifer Venegas  is a 84 year old (1940), who is being seen today for an episodic care visit.  HPI information obtained from: facility chart records, facility staff, patient report, and [facility] chart review. Today's concern is:    Hospitalized 10/17-10/21 for unwitnessed fall and found to have sustained a scalp hematoma in addition to an acute ischemic stroke of left hemisphere watershed territory and left temporo-occipital junction due to large artery sclerosis in setting of >90% proximal left ICA stenosis. Started on heparin, CEA and other interventions not pursued per family given baseline dementia. Repeat heat CT showed continued evolution of left occipital infarct with small petechial hemorrhage within the stroke bed. Declined TCU with discharge back to memory care. New baseline is staff assist for feeding in addition to staff assist with most/all ADLs.  She was able to drink independently using her left hand today. Gross neuros not intact with severe deficits to right. Minimal speech, mostly nonsensical. Does not appear in pain.     Past Medical and Surgical History reviewed in Epic today.    MEDICATIONS:     Current Outpatient Medications   Medication Sig Dispense Refill     acetaminophen (TYLENOL) 500 MG tablet TAKE TWO TABLETS (1000MG) BY MOUTH TWICE DAILY;AND TAKE TWO TABLETS (1000MG) DAILY AS NEEDED 186 tablet 11     apixaban ANTICOAGULANT (ELIQUIS) 5 MG tablet Take 1 tablet (5 mg) by mouth 2 times daily       aspirin 81 MG EC tablet Take 1 tablet (81 mg) by mouth daily. 30 tablet 0     atorvastatin (LIPITOR) 40 MG tablet TAKE 1 TABLET BY MOUTH  "EVERY EVENING 90 tablet 3     calcium polycarbophil (FIBERCON) 625 MG tablet Take 2 tablets by mouth daily       carbidopa-levodopa (SINEMET CR)  MG CR tablet TAKE 1 TABLET BY MOUTH EVERY EVENING 90 tablet 97     carbidopa-levodopa (SINEMET CR)  MG CR tablet TAKE 1 TABLET BY MOUTH AT BEDTIME 90 tablet 97     carbidopa-levodopa (SINEMET)  MG tablet TAKE 1 TABLET BY MOUTH FOUR TIMES DAILY, - OKAY TO CRUSH 360 tablet 3     donepezil (ARICEPT) 10 MG tablet TAKE 1 TABLET BY MOUTH AT BEDTIME 31 tablet 11     entacapone (COMTAN) 200 MG tablet Take 1 tablet (200 mg) by mouth 3 times daily. 0700, 1100, 1500       furosemide (LASIX) 20 MG tablet Take 1 tablet (20 mg) by mouth daily. 30 tablet 11     hydrocortisone 2.5 % cream Apply topically every 6 hours as needed for other (hemorrhoids)       metroNIDAZOLE (METROGEL) 1 % external gel Apply topically daily. To forehead and face until resolved.  Avoid contact with eyes 60 g 4     nystatin (MYCOSTATIN) 035149 UNIT/GM external powder Apply topically 2 times daily Apply under breast twice daily for rash flare until healed. Use as directed with any rash flare. Ok to keep bedside. 30 g 11     SENEXON-S 8.6-50 MG tablet TAKE 1 TABLET BY MOUTH AT BEDTIME;AND TAKE ONE TABLET TWICE DAILY AS NEEDED 270 tablet 97     REVIEW OF SYSTEMS:  Unobtainable secondary to aphasia.     Objective:  /62   Pulse 84   Temp (!) 96.4  F (35.8  C)   Resp 16   Ht 1.651 m (5' 5\")   Wt 76.2 kg (168 lb)   SpO2 97%   BMI 27.96 kg/m    Exam:  GENERAL APPEARANCE:  Alert, in no distress, absent gaze  ENT:  Mouth and posterior oropharynx normal, moist mucous membranes  EYES:  conjunctivae, lids, pupils and irises normal, reactive to light  RESP:  lungs clear to auscultation , diminished breath sounds throughout, fine crackles posterior   CV:  Palpation and auscultation of heart done , regular rate and irregular rhythm, no murmur, rub, or gallop, trace edema R>L 2+ pitting to ankles " now 1+ to legs, ankles and feet  ABDOMEN:  bowel sounds hypoactive   M/S: WC, no hand/ to right, leaning to right, no PROM  SKIN: reddened forehead and cheeks expanding   NEURO:   Cranial nerves 2-12 are grossly off baseline, GCS of 11  PSYCH:  insight and judgement impaired, memory impaired     Labs:   CBC RESULTS:   Recent Labs   Lab Test 10/19/24  1409 10/18/24  0718   WBC 7.9 6.3   RBC 4.23 4.18   HGB 12.2 12.2   HCT 38.4 38.2   MCV 91 91   MCH 28.8 29.2   MCHC 31.8 31.9   RDW 13.6 13.6    226       Last Basic Metabolic Panel:  Recent Labs   Lab Test 10/19/24  1957 10/18/24  2048 10/18/24  0734 10/18/24  0718 10/17/24  2136 10/17/24  1312   NA  --   --   --  139  --  136   POTASSIUM  --   --   --  4.1  --  4.1   CHLORIDE  --   --   --  102  --  99   SHANONN  --   --   --  9.6  --  9.7   CO2  --   --   --  28  --  25   BUN  --   --   --  12.7  --  12.2   CR  --   --   --  0.66  --  0.52   * 158*   < > 103*   < > 110*    < > = values in this interval not displayed.       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, without behavioral disturbance, psychotic disturbance, mood disturbance, or anxiety (H)  (R29.6) Falls frequently  (M62.81) Generalized muscle weakness  (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  The service relates to a recent or future E/M visit.  Documentation supports medical necessity, relates to ongoing patient management and documents start times and stop times.    Regarding the following diagnoses:     Ischemic stroke (H)  Parkinson's disease with dyskinesia and fluctuating manifestations (H)  Moderate dementia associated with other underlying disease, without behavioral disturbance, psychotic disturbance, mood disturbance, or anxiety (H)  Falls frequently  Generalized muscle weakness  Sebas-neglect of right side  Paroxysmal atrial fibrillation (H)  Stenosis of left carotid artery  Encounter for hospice care discussion,     * I discussed with her Philip, Danilo and Velma  who are the children of the patient.  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     Electronically signed by:  NAREN Rothman CNP                          Sincerely,        NAREN Rothman CNP

## 2024-10-30 NOTE — PROGRESS NOTES
[facility]  [place] Medical Record Number:  [id number]  Place of Service where encounter took place:  [facility] [[id number]]  Chief Complaint   Patient presents with    RECHECK       HPI:    Jennifer Venegas  is a 84 year old (1940), who is being seen today for an episodic care visit.  HPI information obtained from: facility chart records, facility staff, patient report, and [facility] chart review. Today's concern is:    Hospitalized 10/17-10/21 for unwitnessed fall and found to have sustained a scalp hematoma in addition to an acute ischemic stroke of left hemisphere watershed territory and left temporo-occipital junction due to large artery sclerosis in setting of >90% proximal left ICA stenosis. Started on heparin, CEA and other interventions not pursued per family given baseline dementia. Repeat heat CT showed continued evolution of left occipital infarct with small petechial hemorrhage within the stroke bed. Declined TCU with discharge back to memory care. New baseline is staff assist for feeding in addition to staff assist with most/all ADLs.  She was able to drink independently using her left hand today. Gross neuros not intact with severe deficits to right. Minimal speech, mostly nonsensical. Does not appear in pain.     Past Medical and Surgical History reviewed in Epic today.    MEDICATIONS:     Current Outpatient Medications   Medication Sig Dispense Refill    acetaminophen (TYLENOL) 500 MG tablet TAKE TWO TABLETS (1000MG) BY MOUTH TWICE DAILY;AND TAKE TWO TABLETS (1000MG) DAILY AS NEEDED 186 tablet 11    apixaban ANTICOAGULANT (ELIQUIS) 5 MG tablet Take 1 tablet (5 mg) by mouth 2 times daily      aspirin 81 MG EC tablet Take 1 tablet (81 mg) by mouth daily. 30 tablet 0    atorvastatin (LIPITOR) 40 MG tablet TAKE 1 TABLET BY MOUTH EVERY EVENING 90 tablet 3    calcium polycarbophil (FIBERCON) 625 MG tablet Take 2 tablets by mouth daily       "carbidopa-levodopa (SINEMET CR)  MG CR tablet TAKE 1 TABLET BY MOUTH EVERY EVENING 90 tablet 97    carbidopa-levodopa (SINEMET CR)  MG CR tablet TAKE 1 TABLET BY MOUTH AT BEDTIME 90 tablet 97    carbidopa-levodopa (SINEMET)  MG tablet TAKE 1 TABLET BY MOUTH FOUR TIMES DAILY, - OKAY TO CRUSH 360 tablet 3    donepezil (ARICEPT) 10 MG tablet TAKE 1 TABLET BY MOUTH AT BEDTIME 31 tablet 11    entacapone (COMTAN) 200 MG tablet Take 1 tablet (200 mg) by mouth 3 times daily. 0700, 1100, 1500      furosemide (LASIX) 20 MG tablet Take 1 tablet (20 mg) by mouth daily. 30 tablet 11    hydrocortisone 2.5 % cream Apply topically every 6 hours as needed for other (hemorrhoids)      metroNIDAZOLE (METROGEL) 1 % external gel Apply topically daily. To forehead and face until resolved.  Avoid contact with eyes 60 g 4    nystatin (MYCOSTATIN) 729231 UNIT/GM external powder Apply topically 2 times daily Apply under breast twice daily for rash flare until healed. Use as directed with any rash flare. Ok to keep bedside. 30 g 11    SENEXON-S 8.6-50 MG tablet TAKE 1 TABLET BY MOUTH AT BEDTIME;AND TAKE ONE TABLET TWICE DAILY AS NEEDED 270 tablet 97     REVIEW OF SYSTEMS:  Unobtainable secondary to aphasia.     Objective:  /62   Pulse 84   Temp (!) 96.4  F (35.8  C)   Resp 16   Ht 1.651 m (5' 5\")   Wt 76.2 kg (168 lb)   SpO2 97%   BMI 27.96 kg/m    Exam:  GENERAL APPEARANCE:  Alert, in no distress, absent gaze  ENT:  Mouth and posterior oropharynx normal, moist mucous membranes  EYES:  conjunctivae, lids, pupils and irises normal, reactive to light  RESP:  lungs clear to auscultation , diminished breath sounds throughout, fine crackles posterior   CV:  Palpation and auscultation of heart done , regular rate and irregular rhythm, no murmur, rub, or gallop, trace edema R>L 2+ pitting to ankles now 1+ to legs, ankles and feet  ABDOMEN:  bowel sounds hypoactive   M/S: WC, no hand/ to right, leaning to right, no " PROM  SKIN: reddened forehead and cheeks expanding   NEURO:   Cranial nerves 2-12 are grossly off baseline, GCS of 11  PSYCH:  insight and judgement impaired, memory impaired     Labs:   CBC RESULTS:   Recent Labs   Lab Test 10/19/24  1409 10/18/24  0718   WBC 7.9 6.3   RBC 4.23 4.18   HGB 12.2 12.2   HCT 38.4 38.2   MCV 91 91   MCH 28.8 29.2   MCHC 31.8 31.9   RDW 13.6 13.6    226       Last Basic Metabolic Panel:  Recent Labs   Lab Test 10/19/24  1957 10/18/24  2048 10/18/24  0734 10/18/24  0718 10/17/24  2136 10/17/24  1312   NA  --   --   --  139  --  136   POTASSIUM  --   --   --  4.1  --  4.1   CHLORIDE  --   --   --  102  --  99   SHANNON  --   --   --  9.6  --  9.7   CO2  --   --   --  28  --  25   BUN  --   --   --  12.7  --  12.2   CR  --   --   --  0.66  --  0.52   * 158*   < > 103*   < > 110*    < > = values in this interval not displayed.       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, without behavioral disturbance, psychotic disturbance, mood disturbance, or anxiety (H)  (R29.6) Falls frequently  (M62.81) Generalized muscle weakness  (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  The service relates to a recent or future E/M visit.  Documentation supports medical necessity, relates to ongoing patient management and documents start times and stop times.    Regarding the following diagnoses:     Ischemic stroke (H)  Parkinson's disease with dyskinesia and fluctuating manifestations (H)  Moderate dementia associated with other underlying disease, without behavioral disturbance, psychotic disturbance, mood disturbance, or anxiety (H)  Falls frequently  Generalized muscle weakness  Sebas-neglect of right side  Paroxysmal atrial fibrillation (H)  Stenosis of left carotid artery  Encounter for hospice care discussion,     * I discussed with her Philip, Danilo and Velma  who are the children of the patient.  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     Electronically signed by:  NAREN Rothman CNP

## 2024-11-01 ENCOUNTER — TELEPHONE (OUTPATIENT)
Dept: NEUROLOGY | Facility: CLINIC | Age: 84
End: 2024-11-01
Payer: COMMERCIAL

## 2024-11-01 VITALS
SYSTOLIC BLOOD PRESSURE: 101 MMHG | BODY MASS INDEX: 27.99 KG/M2 | HEIGHT: 65 IN | TEMPERATURE: 96.4 F | WEIGHT: 168 LBS | DIASTOLIC BLOOD PRESSURE: 62 MMHG | RESPIRATION RATE: 16 BRPM | OXYGEN SATURATION: 97 % | HEART RATE: 84 BPM

## 2024-11-01 RX ORDER — ASPIRIN 81 MG/1
81 TABLET, CHEWABLE ORAL DAILY
COMMUNITY
Start: 2024-11-01 | End: 2024-11-15

## 2024-11-01 RX ORDER — CARBIDOPA/LEVODOPA 25MG-250MG
1 TABLET ORAL 4 TIMES DAILY
COMMUNITY
Start: 2024-11-01 | End: 2024-11-15

## 2024-11-01 NOTE — TELEPHONE ENCOUNTER
Stroke RN Care Coordination - Chart Review Note    SITUATION     Jennifer Venegas is a 84 year old female who is receiving support for:  Clinic Care Coordination - Post Hospital    BACKGROUND     Patient was admitted to [facility] 10/17-10/21 for acute CVA d/t large artery atherosclerosis. She was subsequently discharge to [place] [facility] for ongoing rehab care.    ASSESSMENT     Per stroke hospital follow up order, pt is to be seen by general neurology in 6-8 weeks. We are not able to accommodate that timeline with a [facility] provider, so pt is recommended to have follow up with preferred partner locations.     PLAN     Stroke Hospital Follow-Up order has been faxed to [facility] at F: [phone] requesting they contact either pt's TCU or her son Philip to schedule follow up.    Bri MENDEZ, RN, SCRN  RN Stroke Neurology Care Coordinator  [facility] Neuroscience Service Line

## 2024-11-11 DIAGNOSIS — Z53.9 DIAGNOSIS NOT YET DEFINED: Primary | ICD-10-CM

## 2024-11-12 VITALS
HEIGHT: 65 IN | OXYGEN SATURATION: 97 % | HEART RATE: 105 BPM | RESPIRATION RATE: 24 BRPM | BODY MASS INDEX: 27.99 KG/M2 | SYSTOLIC BLOOD PRESSURE: 127 MMHG | TEMPERATURE: 98.1 F | WEIGHT: 168 LBS | DIASTOLIC BLOOD PRESSURE: 71 MMHG

## 2024-11-12 NOTE — PROGRESS NOTES
"[facility]  [place] Medical Record Number:  [id number]  Place of Service where encounter took place:  [facility] [[id number]]  Chief Complaint   Patient presents with    RECHECK       Update: at time of chart note completion resident has .    HPI:    Jennifer Venegas  is a 84 year old (1940), who is being seen today for an episodic care visit.  HPI information obtained from: facility chart records, facility staff, and family/first contact son and family  report. Today's concern is:    Resident with history of acute ischemic stroke of left hemisphere watershed territory and left temporo-occipital junction due to large artery sclerosis in setting of >90% proximal left ICA stenosis. Started on heparin, CEA and other interventions not pursued per family given baseline dementia. Repeat heat CT showed continued evolution of left occipital infarct with small petechial hemorrhage within the stroke bed. Declined TCU with discharge back to memory care. Ongoing decline and now refusing food/fluids for past 7 days. Laying in bed receiving bed bath from hospice. Only on comfort medications. With oral secretions in upper airway. Appears comfortable but having apneic periods. Non-hospice medications stopped/oh-hold per hospice.     Past Medical and Surgical History reviewed in Epic today.    MEDICATIONS:    Current Outpatient Medications   Medication Sig Dispense Refill    acetaminophen (TYLENOL) 500 MG tablet TAKE TWO TABLETS (1000MG) BY MOUTH TWICE DAILY;AND TAKE TWO TABLETS (1000MG) DAILY AS NEEDED 186 tablet 11    hydrocortisone 2.5 % cream Apply topically every 6 hours as needed for other (hemorrhoids)      SENEXON-S 8.6-50 MG tablet TAKE 1 TABLET BY MOUTH AT BEDTIME;AND TAKE ONE TABLET TWICE DAILY AS NEEDED 270 tablet 97       REVIEW OF SYSTEMS:  EMMANUEL-actively dying     Objective:  /71   Pulse 105   Temp 98.1  F (36.7  C)   Resp 24   Ht 1.651 m (5' 5\")   Wt " 76.2 kg (168 lb)   SpO2 97%   BMI 27.96 kg/m    Exam:  GENERAL APPEARANCE: actively dying   ENT:  Mouth and posterior oropharynx normal, dry mucous membranes  EYES:  closed  RESP:  lungs clear to auscultation , diminished breath sounds throughout, fine crackles posterior, upper respiratory secretions   CV:  Palpation and auscultation of heart done , regular rate and irregular rhythm, no murmur, rub, or gallop, trace edema R>L 2+ pitting to ankles now 1+ to legs, ankles and feet  ABDOMEN:  bowel sounds hypoactive   M/S:bed bound   SKIN: warm      Labs:   CBC RESULTS:   Recent Labs   Lab Test 10/19/24  1409 10/18/24  0718   WBC 7.9 6.3   RBC 4.23 4.18   HGB 12.2 12.2   HCT 38.4 38.2   MCV 91 91   MCH 28.8 29.2   MCHC 31.8 31.9   RDW 13.6 13.6    226       Last Basic Metabolic Panel:  Recent Labs   Lab Test 10/19/24  1957 10/18/24  2048 10/18/24  0734 10/18/24  0718 10/17/24  2136 10/17/24  1312   NA  --   --   --  139  --  136   POTASSIUM  --   --   --  4.1  --  4.1   CHLORIDE  --   --   --  102  --  99   SHANNON  --   --   --  9.6  --  9.7   CO2  --   --   --  28  --  25   BUN  --   --   --  12.7  --  12.2   CR  --   --   --  0.66  --  0.52   * 158*   < > 103*   < > 110*    < > = values in this interval not displayed.       Liver Function Studies -   Recent Labs   Lab Test 06/26/24  0449 06/25/24  0844   PROTTOTAL 6.5 6.4   ALBUMIN 3.9 4.0   BILITOTAL 0.8 0.3   ALKPHOS 71 63   AST 13 12   ALT 10 <5       TSH   Date Value Ref Range Status   08/08/2024 3.29 0.30 - 4.20 uIU/mL Final   06/25/2024 8.11 (H) 0.30 - 4.20 uIU/mL Final       Lab Results   Component Value Date    A1C 6.0 10/17/2024     ASSESSMENT/PLAN:  (Z51.5) Hospice care patient  (primary encounter diagnosis)  Comment:   Plan:   -comfort medications per hospice not reconciled to epic as no active medications in [facility] since resident has passed       Electronically signed by:  NAREN Rothman CNP

## 2024-11-13 ENCOUNTER — ASSISTED LIVING VISIT (OUTPATIENT)
Dept: GERIATRICS | Facility: CLINIC | Age: 84
End: 2024-11-13
Payer: COMMERCIAL

## 2024-11-13 DIAGNOSIS — Z51.5 HOSPICE CARE PATIENT: Primary | ICD-10-CM

## 2024-11-13 NOTE — LETTER
2024      Jennifer Venegas  C/o [address] Lin  [address] [address]        [facility]  [place] [facility] Record Number:  [id number]  Place of Service where encounter took place:  [facility] [[id number]]  Chief Complaint   Patient presents with     RECHECK       Update: at time of chart note completion resident has .    HPI:    Jennifer Venegas  is a 84 year old (1940), who is being seen today for an episodic care visit.  HPI information obtained from: facility chart records, facility staff, and family/first contact son and family  report. Today's concern is:    Resident with history of acute ischemic stroke of left hemisphere watershed territory and left temporo-occipital junction due to large artery sclerosis in setting of >90% proximal left ICA stenosis. Started on heparin, CEA and other interventions not pursued per family given baseline dementia. Repeat heat CT showed continued evolution of left occipital infarct with small petechial hemorrhage within the stroke bed. Declined TCU with discharge back to memory care. Ongoing decline and now refusing food/fluids for past 7 days. Laying in bed receiving bed bath from hospice. Only on comfort medications. With oral secretions in upper airway. Appears comfortable but having apneic periods. Non-hospice medications stopped/oh-hold per hospice.     Past Medical and Surgical History reviewed in Epic today.    MEDICATIONS:    Current Outpatient Medications   Medication Sig Dispense Refill     acetaminophen (TYLENOL) 500 MG tablet TAKE TWO TABLETS (1000MG) BY MOUTH TWICE DAILY;AND TAKE TWO TABLETS (1000MG) DAILY AS NEEDED 186 tablet 11     hydrocortisone 2.5 % cream Apply topically every 6 hours as needed for other (hemorrhoids)       SENEXON-S 8.6-50 MG tablet TAKE 1 TABLET BY MOUTH AT BEDTIME;AND TAKE ONE TABLET TWICE DAILY AS NEEDED 270 tablet 97       REVIEW OF  "SYSTEMS:  EMMANUEL-actively dying     Objective:  /71   Pulse 105   Temp 98.1  F (36.7  C)   Resp 24   Ht 1.651 m (5' 5\")   Wt 76.2 kg (168 lb)   SpO2 97%   BMI 27.96 kg/m    Exam:  GENERAL APPEARANCE: actively dying   ENT:  Mouth and posterior oropharynx normal, dry mucous membranes  EYES:  closed  RESP:  lungs clear to auscultation , diminished breath sounds throughout, fine crackles posterior, upper respiratory secretions   CV:  Palpation and auscultation of heart done , regular rate and irregular rhythm, no murmur, rub, or gallop, trace edema R>L 2+ pitting to ankles now 1+ to legs, ankles and feet  ABDOMEN:  bowel sounds hypoactive   M/S:bed bound   SKIN: warm      Labs:   CBC RESULTS:   Recent Labs   Lab Test 10/19/24  1409 10/18/24  0718   WBC 7.9 6.3   RBC 4.23 4.18   HGB 12.2 12.2   HCT 38.4 38.2   MCV 91 91   MCH 28.8 29.2   MCHC 31.8 31.9   RDW 13.6 13.6    226       Last Basic Metabolic Panel:  Recent Labs   Lab Test 10/19/24  1957 10/18/24  2048 10/18/24  0734 10/18/24  0718 10/17/24  2136 10/17/24  1312   NA  --   --   --  139  --  136   POTASSIUM  --   --   --  4.1  --  4.1   CHLORIDE  --   --   --  102  --  99   SHANNON  --   --   --  9.6  --  9.7   CO2  --   --   --  28  --  25   BUN  --   --   --  12.7  --  12.2   CR  --   --   --  0.66  --  0.52   * 158*   < > 103*   < > 110*    < > = values in this interval not displayed.       Liver Function Studies -   Recent Labs   Lab Test 06/26/24  0449 06/25/24  0844   PROTTOTAL 6.5 6.4   ALBUMIN 3.9 4.0   BILITOTAL 0.8 0.3   ALKPHOS 71 63   AST 13 12   ALT 10 <5       TSH   Date Value Ref Range Status   08/08/2024 3.29 0.30 - 4.20 uIU/mL Final   06/25/2024 8.11 (H) 0.30 - 4.20 uIU/mL Final       Lab Results   Component Value Date    A1C 6.0 10/17/2024     ASSESSMENT/PLAN:  (Z51.5) Hospice care patient  (primary encounter diagnosis)  Comment:   Plan:   -comfort medications per hospice not reconciled to epic as no active medications in " [facility] since resident has passed       Electronically signed by:  NAREN Rothman CNP             Sincerely,        NAREN Rothman CNP

## 2024-11-14 ENCOUNTER — TELEPHONE (OUTPATIENT)
Dept: GERIATRICS | Facility: CLINIC | Age: 84
End: 2024-11-14
Payer: COMMERCIAL

## 2024-11-14 NOTE — TELEPHONE ENCOUNTER
Geriatrics Notification of Patient Death    Provider: NAREN Olivares CNP  Place of death: [facility]  Facility Type:  [facility]    Caller: Jacque THOMPSON  Date of Death: 11/14/24    Patient was on Hospice care:   Patient was seen by [facility] provider: Yes; please explain: 11/13/24        Bonita Quinn RN

## (undated) DEVICE — DRAPE STERI TOWEL LG 1010

## (undated) DEVICE — DRAPE STERI U 1015

## (undated) DEVICE — GLOVE PROTEXIS W/NEU-THERA 8.5  2D73TE85

## (undated) DEVICE — PACK OPEN SHOULDER SOP15OCFSC

## (undated) DEVICE — BNDG COBAN 4"X5YDS STERILE

## (undated) DEVICE — SOL NACL 0.9% IRRIG 1000ML BOTTLE 07138-09

## (undated) DEVICE — SPONGE RAY-TEC 4X8" 7318

## (undated) DEVICE — DRAPE IOBAN INCISE 23X17" 6650EZ

## (undated) DEVICE — DRSG KERLIX 4 1/2"X4YDS ROLL 6715

## (undated) DEVICE — DRILL BIT TORNIER 3MM LONG DWD060

## (undated) DEVICE — SU VICRYL 0 CT-1 CR 8X18" J740D

## (undated) DEVICE — PACK UNIVERSAL SPLIT 29131

## (undated) DEVICE — SYR 20ML LL W/O NDL 302830

## (undated) DEVICE — SOL WATER IRRIG 1000ML BOTTLE 2F7114

## (undated) DEVICE — SYR 03ML LL W/O NDL 309657

## (undated) DEVICE — DRSG TEGADERM 2 1/2X 2 3/4"

## (undated) DEVICE — SPONGE PACK VAGINAL 2X36"

## (undated) DEVICE — NDL 19GA 1.5"

## (undated) DEVICE — DRSG STERI STRIP 1/2X4" R1547

## (undated) DEVICE — ESU ELEC NDL 1" E1552

## (undated) DEVICE — SOL NACL 0.9% IRRIG 3000ML BAG 2B7477

## (undated) DEVICE — WRAP MCCONNELL ARM SUPPORT LG 12-401

## (undated) DEVICE — Device

## (undated) DEVICE — BONE CEMENT MIXEVAC HI VAC W/CARTRIDGE 0306-563-000

## (undated) DEVICE — SYR 50ML CATH TIP W/O NDL 309620

## (undated) DEVICE — ESU CLEANER TIP 31142717

## (undated) DEVICE — GLOVE PROTEXIS BLUE W/NEU-THERA 8.5  2D73EB85

## (undated) DEVICE — SU PROLENE 3-0 PS-2 18" 8687H

## (undated) DEVICE — BLADE SAW SAGITTAL STRK 25X89.5X0.96MM 4/2000 2108-393-005

## (undated) DEVICE — LINEN TOWEL PACK X5 5464

## (undated) DEVICE — DRAPE CONVERTORS U-DRAPE 60X72" 8476

## (undated) DEVICE — SU ETHIBOND 2 V-37 4X30" MX69G

## (undated) DEVICE — ESU GROUND PAD UNIVERSAL W/O CORD

## (undated) DEVICE — DRSG ADAPTIC 3X8" 6113

## (undated) DEVICE — SUCTION IRR SYSTEM W/O TIP INTERPULSE HANDPIECE 0210-100-000

## (undated) DEVICE — ESU PENCIL W/SMOKE EVAC NEPTUNE STRYKER 0703-046-000

## (undated) DEVICE — SOL NACL 0.9% INJ 250ML BAG 2B1322Q

## (undated) DEVICE — NDL 20GA 1.5"

## (undated) DEVICE — SU MONOCRYL 3-0 PS-2 27" Y427H

## (undated) DEVICE — MANIFOLD NEPTUNE 4 PORT 700-20

## (undated) DEVICE — DECANTER BAG 2002S

## (undated) DEVICE — SOL NACL 0.9% 3000ML BAG 2B7477

## (undated) DEVICE — PREP DURAPREP 26ML APL 8630

## (undated) RX ORDER — DEXAMETHASONE SODIUM PHOSPHATE 4 MG/ML
INJECTION, SOLUTION INTRA-ARTICULAR; INTRALESIONAL; INTRAMUSCULAR; INTRAVENOUS; SOFT TISSUE
Status: DISPENSED
Start: 2018-10-25

## (undated) RX ORDER — LIDOCAINE HYDROCHLORIDE 20 MG/ML
INJECTION, SOLUTION EPIDURAL; INFILTRATION; INTRACAUDAL; PERINEURAL
Status: DISPENSED
Start: 2018-10-25

## (undated) RX ORDER — ONDANSETRON 2 MG/ML
INJECTION INTRAMUSCULAR; INTRAVENOUS
Status: DISPENSED
Start: 2018-10-25

## (undated) RX ORDER — VANCOMYCIN HYDROCHLORIDE 1 G/20ML
INJECTION, POWDER, LYOPHILIZED, FOR SOLUTION INTRAVENOUS
Status: DISPENSED
Start: 2018-10-25

## (undated) RX ORDER — EPINEPHRINE 1 MG/ML
INJECTION, SOLUTION INTRAMUSCULAR; SUBCUTANEOUS
Status: DISPENSED
Start: 2018-10-25

## (undated) RX ORDER — FENTANYL CITRATE 50 UG/ML
INJECTION, SOLUTION INTRAMUSCULAR; INTRAVENOUS
Status: DISPENSED
Start: 2018-10-25

## (undated) RX ORDER — HYDROMORPHONE HYDROCHLORIDE 1 MG/ML
INJECTION, SOLUTION INTRAMUSCULAR; INTRAVENOUS; SUBCUTANEOUS
Status: DISPENSED
Start: 2018-10-25

## (undated) RX ORDER — PROPOFOL 10 MG/ML
INJECTION, EMULSION INTRAVENOUS
Status: DISPENSED
Start: 2018-10-25

## (undated) RX ORDER — CEFAZOLIN SODIUM 1 G/3ML
INJECTION, POWDER, FOR SOLUTION INTRAMUSCULAR; INTRAVENOUS
Status: DISPENSED
Start: 2018-10-25

## (undated) RX ORDER — CEFAZOLIN SODIUM 2 G/100ML
INJECTION, SOLUTION INTRAVENOUS
Status: DISPENSED
Start: 2018-10-25

## (undated) RX ORDER — MORPHINE SULFATE 1 MG/ML
INJECTION, SOLUTION EPIDURAL; INTRATHECAL; INTRAVENOUS
Status: DISPENSED
Start: 2018-10-25